# Patient Record
Sex: FEMALE | Race: WHITE | NOT HISPANIC OR LATINO | Employment: OTHER | URBAN - METROPOLITAN AREA
[De-identification: names, ages, dates, MRNs, and addresses within clinical notes are randomized per-mention and may not be internally consistent; named-entity substitution may affect disease eponyms.]

---

## 2017-01-15 ENCOUNTER — APPOINTMENT (OUTPATIENT)
Dept: GENERAL RADIOLOGY | Facility: HOSPITAL | Age: 72
End: 2017-01-15

## 2017-01-15 ENCOUNTER — HOSPITAL ENCOUNTER (EMERGENCY)
Facility: HOSPITAL | Age: 72
Discharge: HOME | End: 2017-01-15
Attending: EMERGENCY MEDICINE | Admitting: EMERGENCY MEDICINE

## 2017-01-15 VITALS
HEART RATE: 106 BPM | HEIGHT: 60 IN | DIASTOLIC BLOOD PRESSURE: 99 MMHG | TEMPERATURE: 98.1 F | WEIGHT: 132 LBS | RESPIRATION RATE: 15 BRPM | OXYGEN SATURATION: 96 % | SYSTOLIC BLOOD PRESSURE: 177 MMHG | BODY MASS INDEX: 25.91 KG/M2

## 2017-01-15 DIAGNOSIS — T48.6X1A THEOPHYLLINE TOXICITY: ICD-10-CM

## 2017-01-15 DIAGNOSIS — R11.2 NAUSEA VOMITING AND DIARRHEA: Primary | ICD-10-CM

## 2017-01-15 DIAGNOSIS — R89.2 THEOPHYLLINE TOXICITY: ICD-10-CM

## 2017-01-15 DIAGNOSIS — R19.7 NAUSEA VOMITING AND DIARRHEA: Primary | ICD-10-CM

## 2017-01-15 LAB
ALBUMIN SERPL-MCNC: 4.2 G/DL (ref 3.5–5.2)
ALBUMIN/GLOB SERPL: 1.3 G/DL
ALP SERPL-CCNC: 90 U/L (ref 39–117)
ALT SERPL W P-5'-P-CCNC: 14 U/L (ref 1–33)
ANION GAP SERPL CALCULATED.3IONS-SCNC: 16.1 MMOL/L
AST SERPL-CCNC: 17 U/L (ref 1–32)
BACTERIA UR QL AUTO: ABNORMAL /HPF
BASOPHILS # BLD AUTO: 0.02 10*3/MM3 (ref 0–0.2)
BASOPHILS NFR BLD AUTO: 0.3 % (ref 0–1.5)
BILIRUB SERPL-MCNC: 0.2 MG/DL (ref 0.1–1.2)
BILIRUB UR QL STRIP: NEGATIVE
BUN BLD-MCNC: 16 MG/DL (ref 8–23)
BUN/CREAT SERPL: 13.2 (ref 7–25)
CALCIUM SPEC-SCNC: 9.9 MG/DL (ref 8.6–10.5)
CHLORIDE SERPL-SCNC: 98 MMOL/L (ref 98–107)
CLARITY UR: CLEAR
CO2 SERPL-SCNC: 23.9 MMOL/L (ref 22–29)
COLOR UR: YELLOW
CREAT BLD-MCNC: 1.21 MG/DL (ref 0.57–1)
DEPRECATED RDW RBC AUTO: 44.5 FL (ref 37–54)
EOSINOPHIL # BLD AUTO: 0.09 10*3/MM3 (ref 0–0.7)
EOSINOPHIL NFR BLD AUTO: 1.1 % (ref 0.3–6.2)
ERYTHROCYTE [DISTWIDTH] IN BLOOD BY AUTOMATED COUNT: 14.5 % (ref 11.7–13)
GFR SERPL CREATININE-BSD FRML MDRD: 44 ML/MIN/1.73
GLOBULIN UR ELPH-MCNC: 3.3 GM/DL
GLUCOSE BLD-MCNC: 131 MG/DL (ref 65–99)
GLUCOSE UR STRIP-MCNC: NEGATIVE MG/DL
HCT VFR BLD AUTO: 40.3 % (ref 35.6–45.5)
HGB BLD-MCNC: 13.3 G/DL (ref 11.9–15.5)
HGB UR QL STRIP.AUTO: NEGATIVE
HOLD SPECIMEN: NORMAL
HOLD SPECIMEN: NORMAL
HYALINE CASTS UR QL AUTO: ABNORMAL /LPF
IMM GRANULOCYTES # BLD: 0 10*3/MM3 (ref 0–0.03)
IMM GRANULOCYTES NFR BLD: 0 % (ref 0–0.5)
KETONES UR QL STRIP: NEGATIVE
LEUKOCYTE ESTERASE UR QL STRIP.AUTO: ABNORMAL
LIPASE SERPL-CCNC: 25 U/L (ref 13–60)
LYMPHOCYTES # BLD AUTO: 1.16 10*3/MM3 (ref 0.9–4.8)
LYMPHOCYTES NFR BLD AUTO: 14.5 % (ref 19.6–45.3)
MCH RBC QN AUTO: 27.6 PG (ref 26.9–32)
MCHC RBC AUTO-ENTMCNC: 33 G/DL (ref 32.4–36.3)
MCV RBC AUTO: 83.6 FL (ref 80.5–98.2)
MONOCYTES # BLD AUTO: 0.35 10*3/MM3 (ref 0.2–1.2)
MONOCYTES NFR BLD AUTO: 4.4 % (ref 5–12)
NEUTROPHILS # BLD AUTO: 6.38 10*3/MM3 (ref 1.9–8.1)
NEUTROPHILS NFR BLD AUTO: 79.7 % (ref 42.7–76)
NITRITE UR QL STRIP: NEGATIVE
PH UR STRIP.AUTO: 7 [PH] (ref 5–8)
PLATELET # BLD AUTO: 257 10*3/MM3 (ref 140–500)
PMV BLD AUTO: 9.8 FL (ref 6–12)
POTASSIUM BLD-SCNC: 3.2 MMOL/L (ref 3.5–5.2)
PROT SERPL-MCNC: 7.5 G/DL (ref 6–8.5)
PROT UR QL STRIP: NEGATIVE
RBC # BLD AUTO: 4.82 10*6/MM3 (ref 3.9–5.2)
RBC # UR: ABNORMAL /HPF
REF LAB TEST METHOD: ABNORMAL
SODIUM BLD-SCNC: 138 MMOL/L (ref 136–145)
SP GR UR STRIP: 1.01 (ref 1–1.03)
SQUAMOUS #/AREA URNS HPF: ABNORMAL /HPF
THEOPHYLLINE SERPL-MCNC: 21.3 MCG/ML (ref 10–20)
TROPONIN T SERPL-MCNC: <0.01 NG/ML (ref 0–0.03)
UROBILINOGEN UR QL STRIP: ABNORMAL
WBC NRBC COR # BLD: 8 10*3/MM3 (ref 4.5–10.7)
WBC UR QL AUTO: ABNORMAL /HPF
WHOLE BLOOD HOLD SPECIMEN: NORMAL
WHOLE BLOOD HOLD SPECIMEN: NORMAL

## 2017-01-15 PROCEDURE — 93010 ELECTROCARDIOGRAM REPORT: CPT | Performed by: INTERNAL MEDICINE

## 2017-01-15 PROCEDURE — 84484 ASSAY OF TROPONIN QUANT: CPT | Performed by: EMERGENCY MEDICINE

## 2017-01-15 PROCEDURE — 80053 COMPREHEN METABOLIC PANEL: CPT | Performed by: EMERGENCY MEDICINE

## 2017-01-15 PROCEDURE — 74022 RADEX COMPL AQT ABD SERIES: CPT

## 2017-01-15 PROCEDURE — 99284 EMERGENCY DEPT VISIT MOD MDM: CPT

## 2017-01-15 PROCEDURE — 93005 ELECTROCARDIOGRAM TRACING: CPT | Performed by: EMERGENCY MEDICINE

## 2017-01-15 PROCEDURE — 96374 THER/PROPH/DIAG INJ IV PUSH: CPT

## 2017-01-15 PROCEDURE — 85025 COMPLETE CBC W/AUTO DIFF WBC: CPT | Performed by: EMERGENCY MEDICINE

## 2017-01-15 PROCEDURE — 80198 ASSAY OF THEOPHYLLINE: CPT | Performed by: EMERGENCY MEDICINE

## 2017-01-15 PROCEDURE — 83690 ASSAY OF LIPASE: CPT | Performed by: EMERGENCY MEDICINE

## 2017-01-15 PROCEDURE — 25010000002 ONDANSETRON PER 1 MG: Performed by: EMERGENCY MEDICINE

## 2017-01-15 PROCEDURE — 81001 URINALYSIS AUTO W/SCOPE: CPT | Performed by: EMERGENCY MEDICINE

## 2017-01-15 RX ORDER — ONDANSETRON 2 MG/ML
4 INJECTION INTRAMUSCULAR; INTRAVENOUS ONCE
Status: COMPLETED | OUTPATIENT
Start: 2017-01-15 | End: 2017-01-15

## 2017-01-15 RX ORDER — SODIUM CHLORIDE 0.9 % (FLUSH) 0.9 %
10 SYRINGE (ML) INJECTION AS NEEDED
Status: DISCONTINUED | OUTPATIENT
Start: 2017-01-15 | End: 2017-01-15 | Stop reason: HOSPADM

## 2017-01-15 RX ORDER — ONDANSETRON 4 MG/1
4 TABLET, FILM COATED ORAL EVERY 6 HOURS
Qty: 12 TABLET | Refills: 0 | Status: SHIPPED | OUTPATIENT
Start: 2017-01-15 | End: 2017-08-23

## 2017-01-15 RX ADMIN — ONDANSETRON 4 MG: 2 INJECTION INTRAMUSCULAR; INTRAVENOUS at 19:44

## 2017-01-15 RX ADMIN — SODIUM CHLORIDE 500 ML: 9 INJECTION, SOLUTION INTRAVENOUS at 19:31

## 2017-01-15 NOTE — ED PROVIDER NOTES
" EMERGENCY DEPARTMENT ENCOUNTER    CHIEF COMPLAINT  Chief Complaint: Nausea  History given by: Patient  History limited by: N/A  Room Number: [facility]/C  PMD: Babar Jones MD      HPI:  Pt is a 71 y.o. female who presents complaining of nausea onset today around 0200 after having eaten fish at [employer]. Pt reports pain in her L arm and diarrhea x3. Pt states that her L arm pain resolved PTA. Pt states that she took over the counter nausea medicine but had no improvement. Pt denies CP, SOA, vomiting, cough, fever, and chills.    Duration:  4 hours  Onset: Gradual  Timing: Constant  Location: N/A  Radiation: N/A  Quality: \"nausea\"  Intensity/Severity: Moderate  Progression: Worsening  Associated Symptoms: L arm pain and diarrhea x3  Aggravating Factors: None  Alleviating Factors: None  Previous Episodes: None  Treatment before arrival: Over the counter nausea medicine, no improvement    PAST MEDICAL HISTORY  Active Ambulatory Problems     Diagnosis Date Noted   • Chest pain 10/20/2016   • Colon polyp 02/15/2012   • Chronic obstructive pulmonary disease 02/15/2012   • Diverticulosis of intestine 02/15/2012   • Essential hypertension 03/24/2016   • Gastroesophageal reflux disease without esophagitis 03/24/2016   • Hypercholesterolemia 03/24/2016   • Migraine 02/15/2012   • Osteopenia 10/20/2016   • Multiple thyroid nodules 10/20/2016   • Chronic fatigue 10/20/2016   • Prediabetes 10/29/2016   • Vitamin D deficiency 10/29/2016   • Renal insufficiency 10/29/2016     Resolved Ambulatory Problems     Diagnosis Date Noted   • No Resolved Ambulatory Problems     Past Medical History   Diagnosis Date   • Arthritis    • Asthma    • Depression    • GERD (gastroesophageal reflux disease)    • Hiatal hernia    • Hypokalemia    • Mild mitral regurgitation    • Tricuspid regurgitation        PAST SURGICAL HISTORY  History reviewed. No pertinent past surgical history.    FAMILY HISTORY  Family History   Problem Relation Age of " Onset   • Heart attack Other    • Heart disease Other        SOCIAL HISTORY  Social History     Social History   • Marital status:      Spouse name: N/A   • Number of children: N/A   • Years of education: N/A     Occupational History   • Not on file.     Social History Main Topics   • Smoking status: Former Smoker   • Smokeless tobacco: Never Used   • Alcohol use No   • Drug use: No   • Sexual activity: Defer     Other Topics Concern   • Not on file     Social History Narrative   • No narrative on file       ALLERGIES  Aspirin; Sertraline; and Iodinated diagnostic agents    REVIEW OF SYSTEMS  Review of Systems   Constitutional: Negative for chills and fever.   HENT: Negative for congestion and sore throat.    Respiratory: Negative for cough and shortness of breath.    Cardiovascular: Negative for chest pain.   Gastrointestinal: Positive for diarrhea (x3) and nausea. Negative for abdominal pain and vomiting.   Genitourinary: Negative for difficulty urinating and dysuria.   Musculoskeletal: Negative for neck pain.        L arm pain   Skin: Negative for pallor and rash.   Neurological: Negative for weakness, numbness and headaches.   All other systems reviewed and are negative.      PHYSICAL EXAM  ED Triage Vitals   Temp Heart Rate Resp BP SpO2   01/15/17 1737 01/15/17 1737 01/15/17 1737 01/15/17 1822 01/15/17 1737   98.1 °F (36.7 °C) 122 18 171/87 98 %      Temp src Heart Rate Source Patient Position BP Location FiO2 (%)   -- 01/15/17 1822 01/15/17 1822 -- --    Monitor Lying         Physical Exam   Constitutional: She is oriented to person, place, and time and well-developed, well-nourished, and in no distress. No distress.   HENT:   Head: Normocephalic and atraumatic.   Eyes: EOM are normal. Pupils are equal, round, and reactive to light.   Neck: Normal range of motion. Neck supple.   Cardiovascular: Normal rate, regular rhythm and normal heart sounds.    Pulmonary/Chest: Effort normal and breath sounds  normal. No respiratory distress.   Abdominal: Soft. There is no tenderness. There is no rebound and no guarding.   Musculoskeletal: Normal range of motion. She exhibits no edema.   Neurological: She is alert and oriented to person, place, and time. She has normal sensation and normal strength.   Skin: Skin is warm and dry. No rash noted.   Psychiatric: Mood and affect normal.   Nursing note and vitals reviewed.      LAB RESULTS  Lab Results (last 24 hours)     Procedure Component Value Units Date/Time    CBC & Differential [[id number]] Collected:  01/15/17 1753    Specimen:  Blood Updated:  01/15/17 1804    Narrative:       The following orders were created for panel order CBC & Differential.  Procedure                               Abnormality         Status                     ---------                               -----------         ------                     CBC Auto Differential[[id number]]         Abnormal            Final result                 Please view results for these tests on the individual orders.    Comprehensive Metabolic Panel [[id number]]  (Abnormal) Collected:  01/15/17 1753    Specimen:  Blood Updated:  01/15/17 1829     Glucose 131 (H) mg/dL      BUN 16 mg/dL      Creatinine 1.21 (H) mg/dL      Sodium 138 mmol/L      Potassium 3.2 (L) mmol/L      Chloride 98 mmol/L      CO2 23.9 mmol/L      Calcium 9.9 mg/dL      Total Protein 7.5 g/dL      Albumin 4.20 g/dL      ALT (SGPT) 14 U/L      AST (SGOT) 17 U/L      Alkaline Phosphatase 90 U/L      Total Bilirubin 0.2 mg/dL      eGFR Non African Amer 44 (L) mL/min/1.73      Globulin 3.3 gm/dL      A/G Ratio 1.3 g/dL      BUN/Creatinine Ratio 13.2      Anion Gap 16.1 mmol/L     Narrative:       The MDRD GFR formula is only valid for adults with stable renal function between ages 18 and 70.    Lipase [[id number]]  (Normal) Collected:  01/15/17 1753    Specimen:  Blood Updated:  01/15/17 1829     Lipase 25 U/L     Theophylline Level [[id number]]  (Abnormal)  Collected:  01/15/17 1753    Specimen:  Blood Updated:  01/15/17 1830     Theophylline Level 21.3 (C) mcg/mL     CBC Auto Differential [[id number]]  (Abnormal) Collected:  01/15/17 1753    Specimen:  Blood Updated:  01/15/17 1804     WBC 8.00 10*3/mm3      RBC 4.82 10*6/mm3      Hemoglobin 13.3 g/dL      Hematocrit 40.3 %      MCV 83.6 fL      MCH 27.6 pg      MCHC 33.0 g/dL      RDW 14.5 (H) %      RDW-SD 44.5 fl      MPV 9.8 fL      Platelets 257 10*3/mm3      Neutrophil % 79.7 (H) %      Lymphocyte % 14.5 (L) %      Monocyte % 4.4 (L) %      Eosinophil % 1.1 %      Basophil % 0.3 %      Immature Grans % 0.0 %      Neutrophils, Absolute 6.38 10*3/mm3      Lymphocytes, Absolute 1.16 10*3/mm3      Monocytes, Absolute 0.35 10*3/mm3      Eosinophils, Absolute 0.09 10*3/mm3      Basophils, Absolute 0.02 10*3/mm3      Immature Grans, Absolute 0.00 10*3/mm3     Troponin [[id number]]  (Normal) Collected:  01/15/17 1753    Specimen:  Blood Updated:  01/15/17 1916     Troponin T <0.010 ng/mL     Narrative:       Troponin T Reference Ranges:  Less than 0.03 ng/mL:    Negative for AMI  0.03 to 0.09 ng/mL:      Indeterminant for AMI  Greater than 0.09 ng/mL: Positive for AMI    Urinalysis With / Culture If Indicated [[id number]]  (Abnormal) Collected:  01/15/17 1822    Specimen:  Urine from Urine, Clean Catch Updated:  01/15/17 1843     Color, UA Yellow      Appearance, UA Clear      pH, UA 7.0      Specific Gravity, UA 1.015      Glucose, UA Negative      Ketones, UA Negative      Bilirubin, UA Negative      Blood, UA Negative      Protein, UA Negative      Leuk Esterase, UA Trace (A)      Nitrite, UA Negative      Urobilinogen, UA 1.0 E.U./dL     Urinalysis, Microscopic Only [[id number]]  (Abnormal) Collected:  01/15/17 1822    Specimen:  Urine from Urine, Clean Catch Updated:  01/15/17 1854     RBC, UA 0-2 (A) /HPF      WBC, UA 0-2 (A) /HPF      Bacteria, UA None Seen /HPF      Squamous Epithelial Cells, UA 3-6 (A) /HPF       Hyaline Casts, UA None Seen /LPF      Methodology Manual Light Microscopy           I ordered the above labs and reviewed the results.    RADIOLOGY  XR Abdomen 2 View With Chest 1 View   Final Result         CXR - Shows that the lungs are well-expanded and clear. The heart is borderline enlarged  and there is no change from 11/9/2015. Interpreted by the radiologist and reviewed by me.    XR Abdomen - Shows that the bowel gas pattern is unremarkable. There is no evidence of obstruction or free air or unusual abdominal calcification. Interpreted by radiologist and reviewed by me.    I ordered the above noted radiological studies. Interpreted by radiologist. Reviewed by me in PACS.     EKG         EKG time: 1905  Rhythm/Rate: Sinus Tachycardia at 114 bpm  P waves and MA: Normal  QRS, axis: Normal  ST and T waves: Nonspecific ST changes  Interpreted contemporaneously by me and independently viewed.  Unchanged compared to prior (11/9/15).      PROCEDURES  Procedures      PROGRESS AND CONSULTS  ED Course     6:37 PM:  Vitals: BP: 171/87 HR: 109 Temp: 98.1 °F (36.7 °C) O2 sat: 96%  D/w pt plan for Iv fluids for hydration, Zofran, abdominal/pelvic xray, EKG, and labs for further evaluation.    7:53 PM:  Vitals: BP: 177/96 HR: 101 Temp: 98.1 °F (36.7 °C) O2 sat: 95%  Rechecked pt. Pt is resting comfortably and states that she feels much better. Discussed with pt test results and plan for discharge. Pt understands and agrees with the plan, all questions answered.  Will hold next two doses of Theophylline and follow up with PCP.       MEDICAL DECISION MAKING  Results were reviewed/discussed with the patient and they were also made aware of online access. Pt also made aware that some labs, such as cultures, will not be resulted during ER visit and follow up with PMD is necessary.     MDM  Number of Diagnoses or Management Options     Amount and/or Complexity of Data Reviewed  Clinical lab tests: ordered and reviewed  Tests in  the radiology section of CPT®: ordered and reviewed  Tests in the medicine section of CPT®: ordered and reviewed  Decide to obtain previous medical records or to obtain history from someone other than the patient: yes           DIAGNOSIS  Final diagnoses:   Nausea vomiting and diarrhea- probable food poisining   Theophylline toxicity- mild       DISPOSITION  DISCHARGE    Patient discharged in stable condition.    Reviewed implications of results, diagnosis, meds, responsibility to follow up, warning signs and symptoms of possible worsening, potential complications and reasons to return to ER.    Patient/Family voiced understanding of above instructions.    Discussed plan for discharge, as there is no emergent indication for admission.  Pt/family is agreeable and understands need for follow up and repeat testing.  Pt is aware that discharge does not mean that nothing is wrong but it indicates no emergency is present that requires admission and they must continue care with follow-up as given below or physician of their choice.     FOLLOW-UP  Babar Jones MD  [address] [id number] [phone]    Call in 1 day  If symptoms worsen or not improved         Medication List      New Prescriptions          ondansetron 4 MG tablet   Commonly known as:  ZOFRAN   Take 1 tablet by mouth Every 6 (Six) Hours.           Latest Documented Vital Signs:  As of 10:25 PM  BP- 177/99 HR- 106 Temp- 98.1 °F (36.7 °C) O2 sat- 96%    --  Documentation assistance provided by ovidio Stephens for Umang Hardin MD.  Information recorded by the scribe was done at my direction and has been verified and validated by me.       Jr Stephens  01/15/17 1957       Umang Hardin MD  01/15/17 0521

## 2017-02-08 DIAGNOSIS — R73.03 PREDIABETES: ICD-10-CM

## 2017-02-08 DIAGNOSIS — R53.82 CHRONIC FATIGUE: ICD-10-CM

## 2017-02-08 DIAGNOSIS — E04.2 MULTIPLE THYROID NODULES: ICD-10-CM

## 2017-02-08 DIAGNOSIS — E55.9 VITAMIN D DEFICIENCY: Primary | ICD-10-CM

## 2017-02-08 DIAGNOSIS — E55.9 VITAMIN D DEFICIENCY: ICD-10-CM

## 2017-02-08 DIAGNOSIS — E78.00 HYPERCHOLESTEROLEMIA: Primary | ICD-10-CM

## 2017-02-09 ENCOUNTER — LAB (OUTPATIENT)
Dept: ENDOCRINOLOGY | Age: 72
End: 2017-02-09

## 2017-02-09 DIAGNOSIS — E55.9 VITAMIN D DEFICIENCY: ICD-10-CM

## 2017-02-09 DIAGNOSIS — R53.82 CHRONIC FATIGUE: ICD-10-CM

## 2017-02-09 DIAGNOSIS — R73.03 PREDIABETES: ICD-10-CM

## 2017-02-09 DIAGNOSIS — E04.2 MULTIPLE THYROID NODULES: ICD-10-CM

## 2017-02-09 DIAGNOSIS — E78.00 HYPERCHOLESTEROLEMIA: ICD-10-CM

## 2017-02-10 LAB — 25(OH)D3+25(OH)D2 SERPL-MCNC: 49.9 NG/ML (ref 30–100)

## 2017-02-11 LAB
ALBUMIN SERPL-MCNC: 4.4 G/DL (ref 3.5–5.2)
ALBUMIN/GLOB SERPL: 1.8 G/DL
ALP SERPL-CCNC: 84 U/L (ref 39–117)
ALT SERPL-CCNC: 15 U/L (ref 1–33)
AST SERPL-CCNC: 21 U/L (ref 1–32)
BILIRUB SERPL-MCNC: 0.4 MG/DL (ref 0.1–1.2)
BUN SERPL-MCNC: 16 MG/DL (ref 8–23)
BUN/CREAT SERPL: 12.4 (ref 7–25)
C PEPTIDE SERPL-MCNC: 10.7 NG/ML (ref 1.1–4.4)
CALCIUM SERPL-MCNC: 9.7 MG/DL (ref 8.6–10.5)
CHLORIDE SERPL-SCNC: 99 MMOL/L (ref 98–107)
CHOLEST SERPL-MCNC: 226 MG/DL (ref 0–200)
CO2 SERPL-SCNC: 23.8 MMOL/L (ref 22–29)
CREAT SERPL-MCNC: 1.29 MG/DL (ref 0.57–1)
GLOBULIN SER CALC-MCNC: 2.4 GM/DL
GLUCOSE SERPL-MCNC: 116 MG/DL (ref 65–99)
HBA1C MFR BLD: 5.53 % (ref 4.8–5.6)
HDLC SERPL-MCNC: 94 MG/DL (ref 40–60)
LDLC SERPL CALC-MCNC: 100 MG/DL (ref 0–100)
POTASSIUM SERPL-SCNC: 3.7 MMOL/L (ref 3.5–5.2)
PROT SERPL-MCNC: 6.8 G/DL (ref 6–8.5)
SODIUM SERPL-SCNC: 142 MMOL/L (ref 136–145)
T3FREE SERPL-MCNC: 3.2 PG/ML (ref 2–4.4)
T4 FREE SERPL-MCNC: 1.41 NG/DL (ref 0.93–1.7)
T4 SERPL-MCNC: 9.03 MCG/DL (ref 4.5–11.7)
THYROGLOB AB SERPL-ACNC: <1 IU/ML
THYROGLOB SERPL-MCNC: 5.9 NG/ML
THYROGLOB SERPL-MCNC: NORMAL NG/ML
TRIGL SERPL-MCNC: 158 MG/DL (ref 0–150)
TSH SERPL DL<=0.005 MIU/L-ACNC: 1.45 MIU/ML (ref 0.27–4.2)
VLDLC SERPL CALC-MCNC: 31.6 MG/DL (ref 5–40)

## 2017-02-23 ENCOUNTER — OFFICE VISIT (OUTPATIENT)
Dept: ENDOCRINOLOGY | Age: 72
End: 2017-02-23

## 2017-02-23 VITALS
WEIGHT: 137.4 LBS | RESPIRATION RATE: 16 BRPM | HEIGHT: 59 IN | DIASTOLIC BLOOD PRESSURE: 82 MMHG | SYSTOLIC BLOOD PRESSURE: 120 MMHG | BODY MASS INDEX: 27.7 KG/M2

## 2017-02-23 DIAGNOSIS — E04.2 MULTIPLE THYROID NODULES: ICD-10-CM

## 2017-02-23 DIAGNOSIS — E78.00 HYPERCHOLESTEROLEMIA: ICD-10-CM

## 2017-02-23 DIAGNOSIS — E55.9 VITAMIN D DEFICIENCY: ICD-10-CM

## 2017-02-23 DIAGNOSIS — I10 ESSENTIAL HYPERTENSION: ICD-10-CM

## 2017-02-23 DIAGNOSIS — R73.03 PREDIABETES: Primary | ICD-10-CM

## 2017-02-23 DIAGNOSIS — R53.82 CHRONIC FATIGUE: ICD-10-CM

## 2017-02-23 PROCEDURE — 99214 OFFICE O/P EST MOD 30 MIN: CPT | Performed by: INTERNAL MEDICINE

## 2017-02-23 RX ORDER — ROSUVASTATIN CALCIUM 5 MG/1
5 TABLET, COATED ORAL NIGHTLY
Qty: 30 TABLET | Refills: 11 | Status: SHIPPED | OUTPATIENT
Start: 2017-02-23 | End: 2017-08-23

## 2017-02-23 NOTE — PROGRESS NOTES
"Subjective   Mary Boyle is a 71 y.o. female seen for follow up for thyroid nodule, vit d deficiency, lab review. Patient has questions about her thyroid nodules. Patient states she had a thyroid US prior to first visit with Moriah and was told her thyroid nodules are growing. She has a  that comes to check on her and she was told her thyroid levels are normal. She is not as tired as she was before she started seeing Moriah. She is also drinking Ensure Alive drinks.    History of Present Illness this is a 71-year-old female known patient with prediabetes and thyroid nodules dyslipidemia and vitamin D deficiency.  Little more than a month ago she was in the emergency room because of abdominal pain nausea and vomiting and was found to have food poisoning.  Visit Vitals   • /82   • Resp 16   • Ht 59\" (149.9 cm)   • Wt 137 lb 6.4 oz (62.3 kg)   • BMI 27.75 kg/m2     Allergies   Allergen Reactions   • Aspirin Other (See Comments)     WHEEZING  WHEEZING   • Sertraline Diarrhea, Nausea And Vomiting and Other (See Comments)     CHEST PAIN   CHEST PAIN    • Iodinated Diagnostic Agents Rash       Current Outpatient Prescriptions:   •  albuterol (VENTOLIN HFA) 108 (90 BASE) MCG/ACT inhaler, As Needed., Disp: , Rfl:   •  ergocalciferol (DRISDOL) 40324 UNITS capsule, Take 1 po q week, Disp: 12 capsule, Rfl: 1  •  esomeprazole (NEXIUM) 40 MG capsule, Take 40 mg by mouth Daily., Disp: , Rfl:   •  ipratropium-albuterol (DUO-NEB) 0.5-2.5 mg/mL nebulizer, Inhale 3 mL Every 6 (Six) Hours., Disp: , Rfl:   •  loratadine (CLARITIN) 10 MG tablet, Take 10 mg by mouth Every Night., Disp: , Rfl:   •  nitroglycerin (NITROSTAT) 0.4 MG SL tablet, Place 0.4 mg under the tongue Every 5 (Five) Minutes. prn, Disp: , Rfl:   •  ondansetron (ZOFRAN) 4 MG tablet, Take 1 tablet by mouth Every 6 (Six) Hours., Disp: 12 tablet, Rfl: 0  •  potassium chloride (K-DUR,KLOR-CON) 10 MEQ CR tablet, Take 10 mEq by mouth Daily., Disp: , Rfl: 3  •  " theophylline (THEODUR) 300 MG 12 hr tablet, Take 300 mg by mouth 2 (Two) Times a Day., Disp: , Rfl:   •  triamterene-hydrochlorothiazide (DYAZIDE) 37.5-25 MG per capsule, Take 1 capsule by mouth Daily., Disp: , Rfl:           The following portions of the patient's history were reviewed and updated as appropriate: allergies, current medications, past family history, past medical history, past social history, past surgical history and problem list.    Review of Systems   Constitutional: Negative.    HENT: Negative.    Eyes: Negative.    Respiratory: Negative.    Cardiovascular: Negative.    Gastrointestinal: Negative.    Endocrine: Negative.    Genitourinary: Negative.    Musculoskeletal: Negative.    Skin: Negative.    Allergic/Immunologic: Negative.    Neurological: Negative.    Psychiatric/Behavioral: Negative.        Objective   Physical Exam   Constitutional: She is oriented to person, place, and time. She appears well-developed and well-nourished. No distress.   HENT:   Head: Normocephalic and atraumatic.   Right Ear: External ear normal.   Left Ear: External ear normal.   Nose: Nose normal.   Mouth/Throat: Oropharynx is clear and moist. No oropharyngeal exudate.   Eyes: Conjunctivae and EOM are normal. Pupils are equal, round, and reactive to light. Right eye exhibits no discharge. Left eye exhibits no discharge. No scleral icterus.   Neck: Trachea normal, normal range of motion and full passive range of motion without pain. Neck supple. No JVD present. No tracheal tenderness present. Carotid bruit is not present. No tracheal deviation, no edema and no erythema present. Thyromegaly present. No thyroid mass present.   Soft multiple nodules on both lobes of the thyroid more prominent on the right side.  They move freely with swallowing and are nontender.   Cardiovascular: Normal rate, regular rhythm, normal heart sounds and intact distal pulses.  Exam reveals no gallop and no friction rub.    No murmur  heard.  Pulmonary/Chest: Effort normal and breath sounds normal. No stridor. No respiratory distress. She has no wheezes. She has no rales.   Abdominal: Soft. Bowel sounds are normal. She exhibits no distension and no mass. There is no tenderness. There is no rebound and no guarding. No hernia.   Musculoskeletal: Normal range of motion. She exhibits no edema, tenderness or deformity.   Lymphadenopathy:     She has no cervical adenopathy.   Neurological: She is alert and oriented to person, place, and time. She has normal reflexes. She displays normal reflexes. No cranial nerve deficit. She exhibits normal muscle tone. Coordination normal.   Skin: Skin is warm and dry. No rash noted. She is not diaphoretic. No erythema. No pallor.   Psychiatric: She has a normal mood and affect. Her behavior is normal. Judgment and thought content normal.   Nursing note and vitals reviewed.     Results for orders placed or performed in visit on 02/09/17   Comprehensive Metabolic Panel   Result Value Ref Range    Glucose 116 (H) 65 - 99 mg/dL    BUN 16 8 - 23 mg/dL    Creatinine 1.29 (H) 0.57 - 1.00 mg/dL    eGFR Non African Am 41 (L) >60 mL/min/1.73    eGFR African Am 49 (L) >60 mL/min/1.73    BUN/Creatinine Ratio 12.4 7.0 - 25.0    Sodium 142 136 - 145 mmol/L    Potassium 3.7 3.5 - 5.2 mmol/L    Chloride 99 98 - 107 mmol/L    Total CO2 23.8 22.0 - 29.0 mmol/L    Calcium 9.7 8.6 - 10.5 mg/dL    Total Protein 6.8 6.0 - 8.5 g/dL    Albumin 4.40 3.50 - 5.20 g/dL    Globulin 2.4 gm/dL    A/G Ratio 1.8 g/dL    Total Bilirubin 0.4 0.1 - 1.2 mg/dL    Alkaline Phosphatase 84 39 - 117 U/L    AST (SGOT) 21 1 - 32 U/L    ALT (SGPT) 15 1 - 33 U/L   Hemoglobin A1c   Result Value Ref Range    Hemoglobin A1C 5.53 4.80 - 5.60 %   Lipid Panel   Result Value Ref Range    Total Cholesterol 226 (H) 0 - 200 mg/dL    Triglycerides 158 (H) 0 - 150 mg/dL    HDL Cholesterol 94 (H) 40 - 60 mg/dL    VLDL Cholesterol 31.6 5 - 40 mg/dL    LDL Cholesterol  100 0  - 100 mg/dL   Comprehensive Thyroglobulin   Result Value Ref Range    Thyroglobulin Ab <1.0 IU/mL    Thyroglobulin 5.9 ng/mL    Thyroglobulin (TG-SUNIL) Comment ng/mL   T3, Free   Result Value Ref Range    T3, Free 3.2 2.0 - 4.4 pg/mL   T4 & TSH ([facility])   Result Value Ref Range    TSH 1.450 0.270 - 4.200 mIU/mL    T4, Total 9.03 4.50 - 11.70 mcg/dL   T4, Free   Result Value Ref Range    Free T4 1.41 0.93 - 1.70 ng/dL   C-Peptide   Result Value Ref Range    C-Peptide 10.7 (H) 1.1 - 4.4 ng/mL           Assessment/Plan   Diagnoses and all orders for this visit:    Prediabetes  -     T3, Free; Future  -     T4 & TSH ([facility]); Future  -     T4, Free; Future  -     Comprehensive Thyroglobulin; Future  -     Comprehensive Metabolic Panel; Future  -     Uric Acid; Future  -     Vitamin D 25 Hydroxy; Future  -     C-Peptide; Future  -     Lipid Panel; Future  -     Hemoglobin A1c; Future    Multiple thyroid nodules  -     T3, Free; Future  -     T4 & TSH ([facility]); Future  -     T4, Free; Future  -     Comprehensive Thyroglobulin; Future  -     Comprehensive Metabolic Panel; Future  -     Uric Acid; Future  -     Vitamin D 25 Hydroxy; Future  -     C-Peptide; Future  -     Lipid Panel; Future  -     Hemoglobin A1c; Future  -     US Thyroid; Future    Vitamin D deficiency  -     T3, Free; Future  -     T4 & TSH ([facility]); Future  -     T4, Free; Future  -     Comprehensive Thyroglobulin; Future  -     Comprehensive Metabolic Panel; Future  -     Uric Acid; Future  -     Vitamin D 25 Hydroxy; Future  -     C-Peptide; Future  -     Lipid Panel; Future  -     Hemoglobin A1c; Future    Essential hypertension  -     T3, Free; Future  -     T4 & TSH ([facility]); Future  -     T4, Free; Future  -     Comprehensive Thyroglobulin; Future  -     Comprehensive Metabolic Panel; Future  -     Uric Acid; Future  -     Vitamin D 25 Hydroxy; Future  -     C-Peptide; Future  -     Lipid Panel; Future  -     Hemoglobin A1c;  Future    Hypercholesterolemia  -     T3, Free; Future  -     T4 & TSH ([facility]); Future  -     T4, Free; Future  -     Comprehensive Thyroglobulin; Future  -     Comprehensive Metabolic Panel; Future  -     Uric Acid; Future  -     Vitamin D 25 Hydroxy; Future  -     C-Peptide; Future  -     Lipid Panel; Future  -     Hemoglobin A1c; Future    Chronic fatigue  -     T3, Free; Future  -     T4 & TSH ([facility]); Future  -     T4, Free; Future  -     Comprehensive Thyroglobulin; Future  -     Comprehensive Metabolic Panel; Future  -     Uric Acid; Future  -     Vitamin D 25 Hydroxy; Future  -     C-Peptide; Future  -     Lipid Panel; Future  -     Hemoglobin A1c; Future    Other orders  -     rosuvastatin (CRESTOR) 5 MG tablet; Take 1 tablet by mouth Every Night.              in summary I saw and examined this 71-year-old female for above-mentioned problems.  I reviewed her laboratory evaluation of 02/09/2017 and provided her with a hard copy of it.  Altogether she is clinically and metabolically stable however because of her history of coronary artery disease and angina her total cholesterol and the LDL level are elevated and for that I will put her on Crestor 5 mg daily.  She will see Ms. Moriah Enriquez in 6 months and myself in 12 months with laboratory evaluations.  She will have a thyroid ultrasound prior to her next appointment.

## 2017-04-10 RX ORDER — ERGOCALCIFEROL 1.25 MG/1
CAPSULE ORAL
Qty: 12 CAPSULE | Refills: 0 | Status: CANCELLED | OUTPATIENT
Start: 2017-04-10

## 2017-04-10 RX ORDER — ERGOCALCIFEROL 1.25 MG/1
CAPSULE ORAL
Qty: 26 CAPSULE | Refills: 0 | Status: SHIPPED | OUTPATIENT
Start: 2017-04-10 | End: 2017-04-18 | Stop reason: SDUPTHER

## 2017-04-18 ENCOUNTER — TELEPHONE (OUTPATIENT)
Dept: ENDOCRINOLOGY | Age: 72
End: 2017-04-18

## 2017-04-18 RX ORDER — ERGOCALCIFEROL 1.25 MG/1
CAPSULE ORAL
Qty: 26 CAPSULE | Refills: 0 | Status: SHIPPED | OUTPATIENT
Start: 2017-04-18 | End: 2018-02-23 | Stop reason: SDUPTHER

## 2017-04-18 NOTE — TELEPHONE ENCOUNTER
CALLED PT SERGEY LFT VM TO INFORM SHE IS STILL SUPPOSED TO TAKE THE VIT D AS WELL AS INFORMED AGAIN WHY RX WAS PROB DENIED. INFORMED TO CB IF SHE EVER HAS ISSUES PICKING UP RX IN FUTURE AND GAVE OFFICE # AND INFORMED CAN FABIOLA W FURTHER QUEST      ----- Message from Chanda Pak sent at 4/18/2017 10:12 AM EDT -----  Contact: PATIENT  THE PATIENT GOT YOUR MESSAGE TELLING HER THAT HER VITAMIN D WAS DENIED BUT SHE IS CONFUSED BECAUSE SHE SAYS SHE PICKED UP NO PROBLEM FROM HER PHARMACY. SHE IS WANTING TO MAKE SURE THAT SHE IS SUPPOSED TO STILL BE TAKING THIS AND WONDERS WHY SHE WAS ABLE TO PICK IT UP IF IT HAD BEEN DENIED. PLEASE CALL THE PATIENT TO ADVISE.

## 2017-08-02 DIAGNOSIS — E78.00 HYPERCHOLESTEROLEMIA: ICD-10-CM

## 2017-08-02 DIAGNOSIS — E55.9 VITAMIN D DEFICIENCY: ICD-10-CM

## 2017-08-02 DIAGNOSIS — E04.2 MULTIPLE THYROID NODULES: Primary | ICD-10-CM

## 2017-08-02 DIAGNOSIS — R73.03 PREDIABETES: ICD-10-CM

## 2017-08-10 ENCOUNTER — LAB (OUTPATIENT)
Dept: ENDOCRINOLOGY | Age: 72
End: 2017-08-10

## 2017-08-10 DIAGNOSIS — E04.2 MULTIPLE THYROID NODULES: ICD-10-CM

## 2017-08-10 DIAGNOSIS — R73.03 PREDIABETES: ICD-10-CM

## 2017-08-10 DIAGNOSIS — E55.9 VITAMIN D DEFICIENCY: ICD-10-CM

## 2017-08-10 DIAGNOSIS — E78.00 HYPERCHOLESTEROLEMIA: ICD-10-CM

## 2017-08-13 LAB
C PEPTIDE SERPL-MCNC: 8.3 NG/ML (ref 1.1–4.4)
CHOLEST SERPL-MCNC: 214 MG/DL (ref 0–200)
FT4I SERPL CALC-MCNC: 2.2 (ref 1.2–4.9)
HDLC SERPL-MCNC: 84 MG/DL (ref 40–60)
LDLC SERPL CALC-MCNC: 106 MG/DL (ref 0–100)
MICROALBUMIN UR-MCNC: 5.1 UG/ML
T3FREE SERPL-MCNC: 2.9 PG/ML (ref 2–4.4)
T3RU NFR SERPL: 26 % (ref 24–39)
T4 FREE SERPL-MCNC: 1.4 NG/DL (ref 0.93–1.7)
T4 SERPL-MCNC: 8.3 UG/DL (ref 4.5–12)
THYROGLOB AB SERPL-ACNC: <1 IU/ML
THYROGLOB SERPL-MCNC: 5.5 NG/ML
THYROGLOB SERPL-MCNC: NORMAL NG/ML
TRIGL SERPL-MCNC: 121 MG/DL (ref 0–150)
TSH SERPL DL<=0.005 MIU/L-ACNC: 1.59 UIU/ML (ref 0.45–4.5)
VLDLC SERPL CALC-MCNC: 24.2 MG/DL (ref 5–40)

## 2017-08-23 ENCOUNTER — OFFICE VISIT (OUTPATIENT)
Dept: ENDOCRINOLOGY | Age: 72
End: 2017-08-23

## 2017-08-23 ENCOUNTER — RESULTS ENCOUNTER (OUTPATIENT)
Dept: ENDOCRINOLOGY | Age: 72
End: 2017-08-23

## 2017-08-23 VITALS
SYSTOLIC BLOOD PRESSURE: 122 MMHG | DIASTOLIC BLOOD PRESSURE: 74 MMHG | HEIGHT: 60 IN | WEIGHT: 132 LBS | BODY MASS INDEX: 25.91 KG/M2

## 2017-08-23 DIAGNOSIS — R73.03 PREDIABETES: ICD-10-CM

## 2017-08-23 DIAGNOSIS — R53.82 CHRONIC FATIGUE: ICD-10-CM

## 2017-08-23 DIAGNOSIS — E78.00 HYPERCHOLESTEROLEMIA: ICD-10-CM

## 2017-08-23 DIAGNOSIS — E55.9 VITAMIN D DEFICIENCY: ICD-10-CM

## 2017-08-23 DIAGNOSIS — E04.2 MULTIPLE THYROID NODULES: ICD-10-CM

## 2017-08-23 DIAGNOSIS — I10 ESSENTIAL HYPERTENSION: ICD-10-CM

## 2017-08-23 DIAGNOSIS — E04.2 MULTIPLE THYROID NODULES: Primary | ICD-10-CM

## 2017-08-23 PROCEDURE — 99214 OFFICE O/P EST MOD 30 MIN: CPT | Performed by: NURSE PRACTITIONER

## 2017-08-23 NOTE — PROGRESS NOTES
"Subjective   Mary Boyle is a 71 y.o. female is here today for follow-up.  Chief Complaint   Patient presents with   • Hyperlipidemia     recent labs, pt never started Crestor   • Hypertension   • thyroid nodules   • Vitamin D Deficiency   • Prediabetes     /74  Ht 60\" (152.4 cm)  Wt 132 lb (59.9 kg)  BMI 25.78 kg/m2  Current Outpatient Prescriptions on File Prior to Visit   Medication Sig   • albuterol (VENTOLIN HFA) 108 (90 BASE) MCG/ACT inhaler As Needed.   • ergocalciferol (DRISDOL) 38678 UNITS capsule TAKE 1 CAPSULE PO ONCE A WEEK   • esomeprazole (NEXIUM) 40 MG capsule Take 40 mg by mouth Daily.   • ipratropium-albuterol (DUO-NEB) 0.5-2.5 mg/mL nebulizer Inhale 3 mL Every 6 (Six) Hours.   • loratadine (CLARITIN) 10 MG tablet Take 10 mg by mouth Every Night.   • nitroglycerin (NITROSTAT) 0.4 MG SL tablet Place 0.4 mg under the tongue Every 5 (Five) Minutes. prn   • potassium chloride (K-DUR,KLOR-CON) 10 MEQ CR tablet Take 10 mEq by mouth Daily.   • theophylline (THEODUR) 300 MG 12 hr tablet Take 300 mg by mouth 2 (Two) Times a Day.   • triamterene-hydrochlorothiazide (DYAZIDE) 37.5-25 MG per capsule Take 1 capsule by mouth Daily.   • [DISCONTINUED] ondansetron (ZOFRAN) 4 MG tablet Take 1 tablet by mouth Every 6 (Six) Hours.   • [DISCONTINUED] rosuvastatin (CRESTOR) 5 MG tablet Take 1 tablet by mouth Every Night.     No current facility-administered medications on file prior to visit.      Family History   Problem Relation Age of Onset   • Heart attack Other    • Heart disease Other      Social History   Substance Use Topics   • Smoking status: Former Smoker   • Smokeless tobacco: Never Used   • Alcohol use No     Allergies   Allergen Reactions   • Aspirin Other (See Comments)     WHEEZING  WHEEZING  WHEEZING   • Doxycycline Other (See Comments)   • Sertraline Diarrhea, Nausea And Vomiting and Other (See Comments)     CHEST PAIN   CHEST PAIN   CHEST PAIN    • Iodinated Diagnostic Agents Rash "         History of Present Illness   Chief Complaint   Patient presents with   • Hyperlipidemia     recent labs, pt never started Crestor   • Hypertension   • thyroid nodules   • Vitamin D Deficiency   • Prediabetes   This is a 71-year-old female patient here today for a routine follow-up visit.  She has a history of thyroid nodules and had a thyroid ultrasound done in march.  Her thyroid ultrasound was reviewed which shows her nodules are stable.  She was provided a copy of the report.  She states she never started taking her Crestor and refuses to because she or he has muscle aches and pains and does not want to make the situation worse.  She recently started working at [employer] but states it is taxing and that she has to walk a lot with work.  She was started on Effexor by her primary care provider and states she only took one tablet could not get out of bed the next day.  She does complain of depression however she does not want to try any other medication at this time.  She has been sober for 14.5 years has thought about starting to drink again.  She was encouraged to seek help for continued supportive for her addiction.  She does complain of fatigue.  She is .  We discussed the benefits of antidepressants.  She does have plans to follow-up with her primary care doctor soon.    The following portions of the patient's history were reviewed and updated as appropriate: allergies, current medications, past family history, past medical history, past social history, past surgical history and problem list.    Review of Systems   Constitutional: Negative for fatigue.   HENT: Negative for trouble swallowing.    Eyes: Negative for visual disturbance.   Respiratory: Negative for shortness of breath.    Cardiovascular: Negative for leg swelling.   Endocrine: Negative for polyuria.   Skin: Negative for wound.   Neurological: Negative for numbness.       Objective   Physical Exam   Constitutional: She is oriented to  person, place, and time. She appears well-developed and well-nourished. No distress.   HENT:   Head: Normocephalic and atraumatic.   Right Ear: External ear normal.   Left Ear: External ear normal.   Nose: Nose normal.   Mouth/Throat: Oropharynx is clear and moist. No oropharyngeal exudate.   Eyes: EOM are normal. Pupils are equal, round, and reactive to light. Right eye exhibits no discharge. Left eye exhibits no discharge.   Neck: Trachea normal, normal range of motion and full passive range of motion without pain. Neck supple. No tracheal tenderness present. Carotid bruit is not present. No tracheal deviation, no edema and no erythema present. No thyroid mass and no thyromegaly present.   Cardiovascular: Normal rate, regular rhythm, normal heart sounds and intact distal pulses.  Exam reveals no gallop and no friction rub.    No murmur heard.  Pulmonary/Chest: Effort normal and breath sounds normal. No stridor. No respiratory distress. She has no wheezes. She has no rales.   Abdominal: Soft. Bowel sounds are normal. She exhibits no distension.   Musculoskeletal: Normal range of motion. She exhibits no edema or deformity.   Lymphadenopathy:     She has no cervical adenopathy.   Neurological: She is alert and oriented to person, place, and time.   Skin: Skin is warm and dry. No rash noted. She is not diaphoretic. No erythema. No pallor.   Psychiatric: She has a normal mood and affect. Her behavior is normal. Judgment and thought content normal.   Nursing note and vitals reviewed.    Results for orders placed or performed in visit on 08/10/17   C-Peptide   Result Value Ref Range    C-Peptide 8.3 (H) 1.1 - 4.4 ng/mL   Comprehensive Thyroglobulin   Result Value Ref Range    Thyroglobulin Ab <1.0 IU/mL    Thyroglobulin 5.5 ng/mL    Thyroglobulin (TG-SUNIL) Comment ng/mL   Lipid Panel   Result Value Ref Range    Total Cholesterol 214 (H) 0 - 200 mg/dL    Triglycerides 121 0 - 150 mg/dL    HDL Cholesterol 84 (H) 40 - 60  mg/dL    VLDL Cholesterol 24.2 5 - 40 mg/dL    LDL Cholesterol  106 (H) 0 - 100 mg/dL   T3, Free   Result Value Ref Range    T3, Free 2.9 2.0 - 4.4 pg/mL   T4, Free   Result Value Ref Range    Free T4 1.40 0.93 - 1.70 ng/dL   Thyroid Panel With TSH   Result Value Ref Range    TSH 1.590 0.450 - 4.500 uIU/mL    T4, Total 8.3 4.5 - 12.0 ug/dL    T3 Uptake 26 24 - 39 %    Free Thyroxine Index 2.2 1.2 - 4.9   MicroAlbumin, Urine, Random   Result Value Ref Range    Microalbumin, Urine 5.1 Not Estab. ug/mL         Assessment/Plan   Mary was seen today for hyperlipidemia, hypertension, thyroid nodules, vitamin d deficiency and prediabetes.    Diagnoses and all orders for this visit:    Multiple thyroid nodules    Vitamin D deficiency    Hypercholesterolemia    Prediabetes    In summary, patient was seen and examined.  Her recent labs were reviewed and she was provided a copy.  She also had labs done her primary care provider's office which were reviewed.  Her blood sugars are an satisfactory range.  Her hemoglobin A1c is in normal range.  Her thyroid ultrasound from march was reviewed and she does that her thyroid nodules are stable.  They have suggested that she repeat in 1 year.  She does have some issues with depression however this time she was to follow-up with primary care provider for further discussions of treatment.  She will follow-up with Dr. Echols her next visit in 6 months with labs prior.  I've encouraged her to contact the office should she have any questions or concerns.  We spent considerable time today discussing the benefits of antidepressant therapy.  No medications were changed.  Metabolically she is stable she refuses statin therapy.  Continue all current meds  Follow up in 6 months with labs prior

## 2018-02-09 ENCOUNTER — LAB (OUTPATIENT)
Dept: ENDOCRINOLOGY | Age: 73
End: 2018-02-09

## 2018-02-09 DIAGNOSIS — E04.2 MULTIPLE THYROID NODULES: ICD-10-CM

## 2018-02-12 LAB
25(OH)D3+25(OH)D2 SERPL-MCNC: 83.2 NG/ML (ref 30–100)
ALBUMIN SERPL-MCNC: 3.8 G/DL (ref 3.5–5.2)
ALBUMIN/GLOB SERPL: 1.5 G/DL
ALP SERPL-CCNC: 84 U/L (ref 39–117)
ALT SERPL-CCNC: 13 U/L (ref 1–33)
AST SERPL-CCNC: 16 U/L (ref 1–32)
BILIRUB SERPL-MCNC: 0.3 MG/DL (ref 0.1–1.2)
BUN SERPL-MCNC: 25 MG/DL (ref 8–23)
BUN/CREAT SERPL: 21.7 (ref 7–25)
C PEPTIDE SERPL-MCNC: 10.2 NG/ML (ref 1.1–4.4)
CALCIUM SERPL-MCNC: 9.8 MG/DL (ref 8.6–10.5)
CHLORIDE SERPL-SCNC: 100 MMOL/L (ref 98–107)
CHOLEST SERPL-MCNC: 208 MG/DL (ref 0–200)
CO2 SERPL-SCNC: 26.9 MMOL/L (ref 22–29)
CREAT SERPL-MCNC: 1.15 MG/DL (ref 0.57–1)
GFR SERPLBLD CREATININE-BSD FMLA CKD-EPI: 46 ML/MIN/1.73
GFR SERPLBLD CREATININE-BSD FMLA CKD-EPI: 56 ML/MIN/1.73
GLOBULIN SER CALC-MCNC: 2.6 GM/DL
GLUCOSE SERPL-MCNC: 95 MG/DL (ref 65–99)
HBA1C MFR BLD: 5.54 % (ref 4.8–5.6)
HDLC SERPL-MCNC: 87 MG/DL (ref 40–60)
INTERPRETATION: NORMAL
LDLC SERPL CALC-MCNC: 87 MG/DL (ref 0–100)
POTASSIUM SERPL-SCNC: 3.9 MMOL/L (ref 3.5–5.2)
PROT SERPL-MCNC: 6.4 G/DL (ref 6–8.5)
SODIUM SERPL-SCNC: 141 MMOL/L (ref 136–145)
T3FREE SERPL-MCNC: 2.7 PG/ML (ref 2–4.4)
T4 FREE SERPL-MCNC: 1.22 NG/DL (ref 0.93–1.7)
T4 SERPL-MCNC: 8.29 MCG/DL (ref 4.5–11.7)
THYROGLOB AB SERPL-ACNC: <1 IU/ML
THYROGLOB SERPL-MCNC: 5.3 NG/ML
THYROGLOB SERPL-MCNC: NORMAL NG/ML
TRIGL SERPL-MCNC: 169 MG/DL (ref 0–150)
TSH SERPL DL<=0.005 MIU/L-ACNC: 2.05 MIU/ML (ref 0.27–4.2)
URATE SERPL-MCNC: 8 MG/DL (ref 2.4–5.7)
VLDLC SERPL CALC-MCNC: 33.8 MG/DL (ref 5–40)

## 2018-02-23 ENCOUNTER — OFFICE VISIT (OUTPATIENT)
Dept: ENDOCRINOLOGY | Age: 73
End: 2018-02-23

## 2018-02-23 VITALS
DIASTOLIC BLOOD PRESSURE: 72 MMHG | SYSTOLIC BLOOD PRESSURE: 120 MMHG | BODY MASS INDEX: 24.66 KG/M2 | HEIGHT: 60 IN | WEIGHT: 125.6 LBS | RESPIRATION RATE: 16 BRPM

## 2018-02-23 DIAGNOSIS — I10 ESSENTIAL HYPERTENSION: ICD-10-CM

## 2018-02-23 DIAGNOSIS — E78.00 HYPERCHOLESTEROLEMIA: ICD-10-CM

## 2018-02-23 DIAGNOSIS — R53.82 CHRONIC FATIGUE: ICD-10-CM

## 2018-02-23 DIAGNOSIS — E04.2 NONTOXIC MULTINODULAR GOITER: ICD-10-CM

## 2018-02-23 DIAGNOSIS — E55.9 VITAMIN D DEFICIENCY: ICD-10-CM

## 2018-02-23 DIAGNOSIS — R73.03 PREDIABETES: Primary | ICD-10-CM

## 2018-02-23 PROCEDURE — 99214 OFFICE O/P EST MOD 30 MIN: CPT | Performed by: INTERNAL MEDICINE

## 2018-02-23 RX ORDER — FEBUXOSTAT 80 MG/1
80 TABLET ORAL DAILY
Qty: 90 TABLET | Refills: 3 | Status: SHIPPED | OUTPATIENT
Start: 2018-02-23 | End: 2018-03-09 | Stop reason: SDUPTHER

## 2018-02-23 RX ORDER — ERGOCALCIFEROL 1.25 MG/1
CAPSULE ORAL
Qty: 26 CAPSULE | Refills: 3 | Status: SHIPPED | OUTPATIENT
Start: 2018-02-23 | End: 2018-09-12 | Stop reason: SDUPTHER

## 2018-02-23 RX ORDER — FEBUXOSTAT 80 MG/1
80 TABLET ORAL DAILY
Qty: 30 TABLET | Refills: 5 | Status: SHIPPED | OUTPATIENT
Start: 2018-02-23 | End: 2018-02-23 | Stop reason: SDUPTHER

## 2018-02-23 NOTE — PROGRESS NOTES
"Subjective   Mary Boyle is a 72 y.o. female seen for follow up for thyroid nodule, goiter, vit d deficiency, lab review. Patient had a dizzy spell last night. She is having pain in the front of her neck, trouble swallowing. She was told by her PCP that her right ear has fluid and she would like to have this checked as well.      History of Present Illness this is a 72-year-old female known patient with thyroid nodule as well as prediabetes and vitamin D deficiency and dyslipidemia.  She is complaining of having had dizzy spells last night and dysphagia as well as tenderness in the anterior aspect of her neck.    /72  Resp 16  Ht 152.4 cm (60\")  Wt 57 kg (125 lb 9.6 oz)  BMI 24.53 kg/m2     Allergies   Allergen Reactions   • Aspirin Other (See Comments)     WHEEZING  WHEEZING  WHEEZING   • Doxycycline Other (See Comments)   • Sertraline Diarrhea, Nausea And Vomiting and Other (See Comments)     CHEST PAIN   CHEST PAIN   CHEST PAIN    • Iodinated Diagnostic Agents Rash       Current Outpatient Prescriptions:   •  albuterol (VENTOLIN HFA) 108 (90 BASE) MCG/ACT inhaler, As Needed., Disp: , Rfl:   •  ergocalciferol (DRISDOL) 76051 UNITS capsule, TAKE 1 CAPSULE PO ONCE A WEEK, Disp: 26 capsule, Rfl: 0  •  esomeprazole (NEXIUM) 40 MG capsule, Take 40 mg by mouth Daily., Disp: , Rfl:   •  ipratropium-albuterol (DUO-NEB) 0.5-2.5 mg/mL nebulizer, Inhale 3 mL Every 6 (Six) Hours., Disp: , Rfl:   •  loratadine (CLARITIN) 10 MG tablet, Take 10 mg by mouth Every Night., Disp: , Rfl:   •  nitroglycerin (NITROSTAT) 0.4 MG SL tablet, Place 0.4 mg under the tongue Every 5 (Five) Minutes. prn, Disp: , Rfl:   •  potassium chloride (K-DUR,KLOR-CON) 10 MEQ CR tablet, Take 10 mEq by mouth Daily., Disp: , Rfl: 3  •  theophylline (THEODUR) 300 MG 12 hr tablet, Take 300 mg by mouth 2 (Two) Times a Day., Disp: , Rfl:   •  triamterene-hydrochlorothiazide (DYAZIDE) 37.5-25 MG per capsule, Take 1 capsule by mouth Daily., Disp: , " Rfl:       The following portions of the patient's history were reviewed and updated as appropriate: allergies, current medications, past family history, past medical history, past social history, past surgical history and problem list.    Review of Systems   Constitutional: Negative.    HENT: Positive for sore throat and trouble swallowing.    Eyes: Negative.    Respiratory: Negative.    Cardiovascular: Negative.    Gastrointestinal: Negative.    Endocrine: Negative.    Genitourinary: Negative.    Musculoskeletal: Negative.    Skin: Negative.    Allergic/Immunologic: Negative.    Neurological: Positive for dizziness.   Hematological: Negative.    Psychiatric/Behavioral: Negative.        Objective   Physical Exam   Constitutional: She is oriented to person, place, and time. She appears well-developed and well-nourished. No distress.   HENT:   Head: Normocephalic and atraumatic.   Right Ear: External ear normal.   Left Ear: External ear normal.   Nose: Nose normal.   Mouth/Throat: Oropharynx is clear and moist. No oropharyngeal exudate.   Increased congestion in right nostril with turbinate hypertrophy.   Eyes: Conjunctivae and EOM are normal. Pupils are equal, round, and reactive to light. Right eye exhibits no discharge. Left eye exhibits no discharge. No scleral icterus.   Neck: Trachea normal, normal range of motion and full passive range of motion without pain. Neck supple. No JVD present. No tracheal tenderness present. Carotid bruit is not present. No tracheal deviation, no edema and no erythema present. Thyromegaly present. No thyroid mass present.   Soft multiple nodules on both lobes of the thyroid more prominent on the right side.  They move freely with swallowing and are nontender.   Cardiovascular: Normal rate, regular rhythm, normal heart sounds and intact distal pulses.  Exam reveals no gallop and no friction rub.    No murmur heard.  Pulmonary/Chest: Effort normal and breath sounds normal. No stridor.  No respiratory distress. She has no wheezes. She has no rales.   Abdominal: Soft. Bowel sounds are normal. She exhibits no distension and no mass. There is no tenderness. There is no rebound and no guarding. No hernia.   Musculoskeletal: Normal range of motion. She exhibits no edema, tenderness or deformity.   Lymphadenopathy:     She has no cervical adenopathy.   Neurological: She is alert and oriented to person, place, and time. She has normal reflexes. She displays normal reflexes. No cranial nerve deficit. She exhibits normal muscle tone. Coordination normal.   Skin: Skin is warm and dry. No rash noted. She is not diaphoretic. No erythema. No pallor.   Psychiatric: She has a normal mood and affect. Her behavior is normal. Judgment and thought content normal.   Nursing note and vitals reviewed.        Assessment/Plan   Diagnoses and all orders for this visit:    Prediabetes  -     T3, Free; Future  -     T4 & TSH ([facility]); Future  -     T4, Free; Future  -     Comprehensive Thyroglobulin; Future  -     Comprehensive Metabolic Panel; Future  -     Uric Acid; Future  -     Vitamin D 25 Hydroxy; Future  -     C-Peptide; Future  -     Hemoglobin A1c; Future  -     Lipoprotein NMR; Future    Essential hypertension  -     T3, Free; Future  -     T4 & TSH ([facility]); Future  -     T4, Free; Future  -     Comprehensive Thyroglobulin; Future  -     Comprehensive Metabolic Panel; Future  -     Uric Acid; Future  -     Vitamin D 25 Hydroxy; Future  -     C-Peptide; Future  -     Hemoglobin A1c; Future  -     Lipoprotein NMR; Future    Hypercholesterolemia  -     T3, Free; Future  -     T4 & TSH ([facility]); Future  -     T4, Free; Future  -     Comprehensive Thyroglobulin; Future  -     Comprehensive Metabolic Panel; Future  -     Uric Acid; Future  -     Vitamin D 25 Hydroxy; Future  -     C-Peptide; Future  -     Hemoglobin A1c; Future  -     Lipoprotein NMR; Future    Vitamin D deficiency  -     T3, Free; Future  -     T4  & TSH ([facility]); Future  -     T4, Free; Future  -     Comprehensive Thyroglobulin; Future  -     Comprehensive Metabolic Panel; Future  -     Uric Acid; Future  -     Vitamin D 25 Hydroxy; Future  -     C-Peptide; Future  -     Hemoglobin A1c; Future  -     Lipoprotein NMR; Future    Chronic fatigue  -     T3, Free; Future  -     T4 & TSH ([facility]); Future  -     T4, Free; Future  -     Comprehensive Thyroglobulin; Future  -     Comprehensive Metabolic Panel; Future  -     Uric Acid; Future  -     Vitamin D 25 Hydroxy; Future  -     C-Peptide; Future  -     Hemoglobin A1c; Future  -     Lipoprotein NMR; Future    Nontoxic multinodular goiter  -     US Thyroid; Future  -     T3, Free; Future  -     T4 & TSH ([facility]); Future  -     T4, Free; Future  -     Comprehensive Thyroglobulin; Future  -     Comprehensive Metabolic Panel; Future  -     Uric Acid; Future  -     Vitamin D 25 Hydroxy; Future  -     C-Peptide; Future  -     Hemoglobin A1c; Future  -     Lipoprotein NMR; Future    Other orders  -     ergocalciferol (DRISDOL) 43836 units capsule; TAKE 1 CAPSULE PO ONCE A WEEK  -     Discontinue: ULORIC 80 MG tablet tablet; Take 1 tablet by mouth Daily.  -     Discontinue: fluticasone (VERAMYST) 27.5 MCG/SPRAY nasal spray; 2 sprays into each nostril Daily.  -     ULORIC 80 MG tablet tablet; Take 1 tablet by mouth Daily.  -     fluticasone (VERAMYST) 27.5 MCG/SPRAY nasal spray; 2 sprays into each nostril Daily.               In summary I saw and examined this 72-year-old female for above-mentioned problems.  I reviewed her laboratory evaluation of 02/09/2018 and provided her with a hard copy of it.  She is clinically and metabolically stable however her uric acid is elevated and so I will start her on Uloric 80 mg daily.  Also for her upper respiratory congestion and I will give her Veramyst 1 squirt in each nostrils twice daily.  She will see Ms. Moriah Enriquez in 6 months or sooner if needed with laboratory  evaluation prior to each office visit.

## 2018-03-01 DIAGNOSIS — R53.82 CHRONIC FATIGUE: ICD-10-CM

## 2018-03-01 DIAGNOSIS — E04.2 MULTIPLE THYROID NODULES: ICD-10-CM

## 2018-03-01 DIAGNOSIS — I10 ESSENTIAL HYPERTENSION: ICD-10-CM

## 2018-03-01 DIAGNOSIS — E78.00 HYPERCHOLESTEROLEMIA: ICD-10-CM

## 2018-03-01 RX ORDER — IPRATROPIUM BROMIDE AND ALBUTEROL SULFATE 2.5; .5 MG/3ML; MG/3ML
SOLUTION RESPIRATORY (INHALATION)
Qty: 360 ML | Refills: 0 | Status: SHIPPED | OUTPATIENT
Start: 2018-03-01 | End: 2021-01-08

## 2018-03-07 ENCOUNTER — PRIOR AUTHORIZATION (OUTPATIENT)
Dept: ENDOCRINOLOGY | Age: 73
End: 2018-03-07

## 2018-03-07 NOTE — TELEPHONE ENCOUNTER
PTS PA FOR ULORIC HAS BEEN SUBMITTED [facility] ON 3/7/18 AND WAS APPROVED ON 3/9/18. PHARMACY WAS NOTIFIED

## 2018-03-09 RX ORDER — FEBUXOSTAT 80 MG/1
80 TABLET ORAL DAILY
Qty: 90 TABLET | Refills: 3 | Status: SHIPPED | OUTPATIENT
Start: 2018-03-09 | End: 2018-03-14

## 2018-03-14 RX ORDER — ALLOPURINOL 100 MG/1
100 TABLET ORAL DAILY
Qty: 30 TABLET | Refills: 5 | Status: SHIPPED | OUTPATIENT
Start: 2018-03-14 | End: 2018-09-12

## 2018-03-14 RX ORDER — ALLOPURINOL 100 MG/1
100 TABLET ORAL DAILY
Qty: 30 TABLET | Refills: 5 | OUTPATIENT
Start: 2018-03-14 | End: 2018-03-14 | Stop reason: SDUPTHER

## 2018-06-13 ENCOUNTER — HOSPITAL ENCOUNTER (OUTPATIENT)
Dept: CARDIOLOGY | Facility: HOSPITAL | Age: 73
Discharge: HOME | End: 2018-06-13
Attending: INTERNAL MEDICINE | Admitting: INTERNAL MEDICINE

## 2018-06-13 ENCOUNTER — OFFICE VISIT (OUTPATIENT)
Dept: CARDIOLOGY | Facility: CLINIC | Age: 73
End: 2018-06-13

## 2018-06-13 VITALS
BODY MASS INDEX: 27.08 KG/M2 | HEIGHT: 58 IN | HEART RATE: 88 BPM | DIASTOLIC BLOOD PRESSURE: 75 MMHG | SYSTOLIC BLOOD PRESSURE: 126 MMHG | WEIGHT: 129 LBS

## 2018-06-13 DIAGNOSIS — I10 ESSENTIAL HYPERTENSION: ICD-10-CM

## 2018-06-13 DIAGNOSIS — M79.89 LEG SWELLING: ICD-10-CM

## 2018-06-13 DIAGNOSIS — R06.02 SOB (SHORTNESS OF BREATH): Primary | ICD-10-CM

## 2018-06-13 DIAGNOSIS — E78.00 HYPERCHOLESTEROLEMIA: ICD-10-CM

## 2018-06-13 PROCEDURE — 0399T HC MYOCARDL STRAIN IMAG QUAN ASSMT PER SESS: CPT

## 2018-06-13 PROCEDURE — 99214 OFFICE O/P EST MOD 30 MIN: CPT | Performed by: INTERNAL MEDICINE

## 2018-06-13 PROCEDURE — 93306 TTE W/DOPPLER COMPLETE: CPT

## 2018-06-13 PROCEDURE — 93000 ELECTROCARDIOGRAM COMPLETE: CPT | Performed by: INTERNAL MEDICINE

## 2018-06-13 PROCEDURE — 93306 TTE W/DOPPLER COMPLETE: CPT | Performed by: INTERNAL MEDICINE

## 2018-06-13 PROCEDURE — 0399T ADULT TRANSTHORACIC ECHO COMPLETE W/ CONT IF NECESSARY PER PROTOCOL: CPT | Performed by: INTERNAL MEDICINE

## 2018-06-13 NOTE — PROGRESS NOTES
" Subjective:       Mary Boyle is a 72 y.o. female who here for follow up    CC  LEG SWELLING  HPI  72-year-old female with known history of benign essential arterial hypertension, hypercholesterolemia as well as shortness of breath has been complaining of the bilateral leg swelling mild-to-moderate in intensity     Problem List Items Addressed This Visit        Cardiovascular and Mediastinum    Essential hypertension    Relevant Orders    Stress Test With Myocardial Perfusion One Day    Adult Transthoracic Echo Complete W/ Cont if Necessary Per Protocol (Completed)    Hypercholesterolemia      Other Visit Diagnoses     SOB (shortness of breath)    -  Primary    Relevant Orders    Stress Test With Myocardial Perfusion One Day    Adult Transthoracic Echo Complete W/ Cont if Necessary Per Protocol (Completed)    Leg swelling            .    The following portions of the patient's history were reviewed and updated as appropriate: allergies, current medications, past family history, past medical history, past social history, past surgical history and problem list.    Past Medical History:   Diagnosis Date   • Arthritis    • Asthma    • Chest pain    • Depression    • GERD (gastroesophageal reflux disease)    • Hiatal hernia    • Hypokalemia    • Mild mitral regurgitation    • Thyroid nodule    • Tricuspid regurgitation    • Vitamin D deficiency     reports that she has quit smoking. She has never used smokeless tobacco. She reports that she does not drink alcohol or use drugs.  Family History   Problem Relation Age of Onset   • Heart attack Other    • Heart disease Other        Review of Systems  Constitutional: No wt loss, fever, fatigue  Gastrointestinal: No nausea, abdominal pain  Behavioral/Psych: No insomnia or anxiety   Cardiovascular Leg swelling  Objective:       Physical Exam           Physical Exam  /75 (BP Location: Left arm, Patient Position: Sitting)   Pulse 88   Ht 147.3 cm (58\")   Wt 58.5 kg " (129 lb)   BMI 26.96 kg/m²     General appearance: NAD, conversant   Eyes: anicteric sclerae, moist conjunctivae; no lid-lag; PERRLA   HENT: Atraumatic; oropharynx clear with moist mucous membranes and no mucosal ulcerations;  normal hard and soft palate   Neck: Trachea midline; FROM, supple, no thyromegaly or lymphadenopathy   Lungs: CTA, with normal respiratory effort and no intercostal retractions   CV: S1-S2 regular, no murmurs, no rub, no gallop   Abdomen: Soft, non-tender; no masses or HSM   Extremities: No peripheral edema or extremity lymphadenopathy  Skin: Normal temperature, turgor and texture; no rash, ulcers or subcutaneous nodules   Psych: Appropriate affect, alert and oriented to person, place and time           Cardiographics  @  ECG 12 Lead  Date/Time: 6/13/2018 11:48 AM  Performed by: TRICIA TREJO  Authorized by: TRICIA TREJO   Comparison: compared with previous ECG   Similar to previous ECG  Rhythm: sinus rhythm  Conduction: incomplete RBBB  Clinical impression: non-specific ECG            Echocardiogram:        Current Outpatient Prescriptions:   •  albuterol (VENTOLIN HFA) 108 (90 BASE) MCG/ACT inhaler, As Needed., Disp: , Rfl:   •  allopurinol (ZYLOPRIM) 100 MG tablet, Take 1 tablet by mouth Daily., Disp: 30 tablet, Rfl: 5  •  ergocalciferol (DRISDOL) 79068 units capsule, TAKE 1 CAPSULE PO ONCE A WEEK, Disp: 26 capsule, Rfl: 3  •  esomeprazole (NEXIUM) 40 MG capsule, Take 40 mg by mouth Daily., Disp: , Rfl:   •  fluticasone (VERAMYST) 27.5 MCG/SPRAY nasal spray, 2 sprays into each nostril Daily., Disp: 1 each, Rfl: 12  •  ipratropium-albuterol (DUO-NEB) 0.5-2.5 mg/mL nebulizer, 2 sprays into each nostril daily, Disp: 360 mL, Rfl: 0  •  loratadine (CLARITIN) 10 MG tablet, Take 10 mg by mouth Every Night., Disp: , Rfl:   •  nitroglycerin (NITROSTAT) 0.4 MG SL tablet, Place 0.4 mg under the tongue Every 5 (Five) Minutes. prn, Disp: , Rfl:   •  potassium chloride (K-DUR,KLOR-CON)  10 MEQ CR tablet, Take 10 mEq by mouth Daily., Disp: , Rfl: 3  •  theophylline (THEODUR) 300 MG 12 hr tablet, Take 300 mg by mouth 2 (Two) Times a Day., Disp: , Rfl:   •  triamterene-hydrochlorothiazide (DYAZIDE) 37.5-25 MG per capsule, Take 1 capsule by mouth Daily., Disp: , Rfl:    Assessment:        Patient Active Problem List   Diagnosis   • Chest pain   • Colon polyp   • COPD (chronic obstructive pulmonary disease)   • Diverticulosis   • Essential hypertension   • GERD without esophagitis   • Hypercholesterolemia   • Osteopenia   • Multiple thyroid nodules   • Chronic fatigue   • Prediabetes   • Vitamin D deficiency   • Renal insufficiency               Plan:            ICD-10-CM ICD-9-CM   1. SOB (shortness of breath) R06.02 786.05   2. Essential hypertension I10 401.9   3. Hypercholesterolemia E78.00 272.0   4. Leg swelling M79.89 729.81     1. SOB (shortness of breath)  Considering the patient's symptoms as well as clinical situation and  EKG findings, along with cardiac risk factors, ischemic workup is necessary to rule out ischemic cardiomyopathy, stress induced arrhythmias, and functional capacity for diagnosis as well as prognostic consideration    - Stress Test With Myocardial Perfusion One Day  - Adult Transthoracic Echo Complete W/ Cont if Necessary Per Protocol    2. Essential hypertension  Considering patient's medical condition as well as the risk factors, patient will require echocardiogram for further evaluation for the LV function, four-chamber evaluation, including the pressures, valvular function and  pericardial disease and pericardial effusion    - Stress Test With Myocardial Perfusion One Day  - Adult Transthoracic Echo Complete W/ Cont if Necessary Per Protocol    3. Hypercholesterolemia  Risk of the hyperlipidemia, importance of the treatment has been explained    Pros and cons of the statins has been explained    Regular blood workup as well as side effects including the liver failure,  myelopathy death has been explained          4. Leg swelling  Mary Boyle has been complaining of the leg swelling, appears dependent pedal edema, significantly contributed with a venous insufficiency.    Strong recommendations of elevating the legs as well as using support hoses, along with the control of fluids and salt restriction has been explained in details         STOCKINGS    CASTILLO CARDIOLYTE, ECHO    SEE US 2-4 WKS  COUNSELING:    Mary PalmerMoraeling was given to patient for the following topics: diagnostic results, risk factor reductions, impressions, risks and benefits of treatment options and importance of treatment compliance .       SMOKING COUNSELING:    Counseling given: Not Answered      EMR Dragon/Transcription disclaimer:   Much of this encounter note is an electronic transcription/translation of spoken language to printed text. The electronic translation of spoken language may permit erroneous, or at times, nonsensical words or phrases to be inadvertently transcribed; Although I have reviewed the note for such errors, some may still exist.

## 2018-06-14 LAB
BH CV ECHO MEAS - ACS: 1.8 CM
BH CV ECHO MEAS - AO MAX PG (FULL): 1 MMHG
BH CV ECHO MEAS - AO MAX PG: 5.3 MMHG
BH CV ECHO MEAS - AO MEAN PG (FULL): 1 MMHG
BH CV ECHO MEAS - AO MEAN PG: 3 MMHG
BH CV ECHO MEAS - AO ROOT AREA (BSA CORRECTED): 2
BH CV ECHO MEAS - AO ROOT AREA: 7.1 CM^2
BH CV ECHO MEAS - AO ROOT DIAM: 3 CM
BH CV ECHO MEAS - AO V2 MAX: 115 CM/SEC
BH CV ECHO MEAS - AO V2 MEAN: 84.6 CM/SEC
BH CV ECHO MEAS - AO V2 VTI: 22.5 CM
BH CV ECHO MEAS - AVA(I,A): 2.4 CM^2
BH CV ECHO MEAS - AVA(I,D): 2.4 CM^2
BH CV ECHO MEAS - AVA(V,A): 2.3 CM^2
BH CV ECHO MEAS - AVA(V,D): 2.3 CM^2
BH CV ECHO MEAS - BSA(HAYCOCK): 1.6 M^2
BH CV ECHO MEAS - BSA: 1.5 M^2
BH CV ECHO MEAS - BZI_BMI: 27 KILOGRAMS/M^2
BH CV ECHO MEAS - BZI_METRIC_HEIGHT: 147.3 CM
BH CV ECHO MEAS - BZI_METRIC_WEIGHT: 58.5 KG
BH CV ECHO MEAS - CONTRAST EF (2CH): 58.6 ML/M^2
BH CV ECHO MEAS - CONTRAST EF 4CH: 64.7 ML/M^2
BH CV ECHO MEAS - EDV(CUBED): 97.3 ML
BH CV ECHO MEAS - EDV(MOD-SP2): 29 ML
BH CV ECHO MEAS - EDV(MOD-SP4): 51 ML
BH CV ECHO MEAS - EDV(TEICH): 97.3 ML
BH CV ECHO MEAS - EF(CUBED): 79.8 %
BH CV ECHO MEAS - EF(MOD-BP): 62 %
BH CV ECHO MEAS - EF(MOD-SP2): 58.6 %
BH CV ECHO MEAS - EF(MOD-SP4): 64.7 %
BH CV ECHO MEAS - EF(TEICH): 72.2 %
BH CV ECHO MEAS - ESV(CUBED): 19.7 ML
BH CV ECHO MEAS - ESV(MOD-SP2): 12 ML
BH CV ECHO MEAS - ESV(MOD-SP4): 18 ML
BH CV ECHO MEAS - ESV(TEICH): 27 ML
BH CV ECHO MEAS - FS: 41.3 %
BH CV ECHO MEAS - IVS/LVPW: 0.82
BH CV ECHO MEAS - IVSD: 0.9 CM
BH CV ECHO MEAS - LA DIMENSION: 3.9 CM
BH CV ECHO MEAS - LA/AO: 1.3
BH CV ECHO MEAS - LAT PEAK E' VEL: 7.4 CM/SEC
BH CV ECHO MEAS - LV DIASTOLIC VOL/BSA (35-75): 33.7 ML/M^2
BH CV ECHO MEAS - LV MASS(C)D: 158.8 GRAMS
BH CV ECHO MEAS - LV MASS(C)DI: 105.1 GRAMS/M^2
BH CV ECHO MEAS - LV MAX PG: 4.2 MMHG
BH CV ECHO MEAS - LV MEAN PG: 2 MMHG
BH CV ECHO MEAS - LV SYSTOLIC VOL/BSA (12-30): 11.9 ML/M^2
BH CV ECHO MEAS - LV V1 MAX: 103 CM/SEC
BH CV ECHO MEAS - LV V1 MEAN: 67.2 CM/SEC
BH CV ECHO MEAS - LV V1 VTI: 21 CM
BH CV ECHO MEAS - LVIDD: 4.6 CM
BH CV ECHO MEAS - LVIDS: 2.7 CM
BH CV ECHO MEAS - LVLD AP2: 5.3 CM
BH CV ECHO MEAS - LVLD AP4: 6.7 CM
BH CV ECHO MEAS - LVLS AP2: 4.7 CM
BH CV ECHO MEAS - LVLS AP4: 5.6 CM
BH CV ECHO MEAS - LVOT AREA (M): 2.5 CM^2
BH CV ECHO MEAS - LVOT AREA: 2.5 CM^2
BH CV ECHO MEAS - LVOT DIAM: 1.8 CM
BH CV ECHO MEAS - LVPWD: 1.1 CM
BH CV ECHO MEAS - MED PEAK E' VEL: 6.5 CM/SEC
BH CV ECHO MEAS - MV A DUR: 0.19 SEC
BH CV ECHO MEAS - MV A MAX VEL: 135 CM/SEC
BH CV ECHO MEAS - MV DEC SLOPE: 286 CM/SEC^2
BH CV ECHO MEAS - MV DEC TIME: 0.17 SEC
BH CV ECHO MEAS - MV E MAX VEL: 75.7 CM/SEC
BH CV ECHO MEAS - MV E/A: 0.56
BH CV ECHO MEAS - MV MAX PG: 8.2 MMHG
BH CV ECHO MEAS - MV MEAN PG: 2 MMHG
BH CV ECHO MEAS - MV P1/2T MAX VEL: 78.7 CM/SEC
BH CV ECHO MEAS - MV P1/2T: 80.6 MSEC
BH CV ECHO MEAS - MV V2 MAX: 143 CM/SEC
BH CV ECHO MEAS - MV V2 MEAN: 61.5 CM/SEC
BH CV ECHO MEAS - MV V2 VTI: 21.9 CM
BH CV ECHO MEAS - MVA P1/2T LCG: 2.8 CM^2
BH CV ECHO MEAS - MVA(P1/2T): 2.7 CM^2
BH CV ECHO MEAS - MVA(VTI): 2.4 CM^2
BH CV ECHO MEAS - PA ACC TIME: 0.13 SEC
BH CV ECHO MEAS - PA MAX PG (FULL): 1.2 MMHG
BH CV ECHO MEAS - PA MAX PG: 3.7 MMHG
BH CV ECHO MEAS - PA PR(ACCEL): 19.6 MMHG
BH CV ECHO MEAS - PA V2 MAX: 96.4 CM/SEC
BH CV ECHO MEAS - PULM A REVS DUR: 0.11 SEC
BH CV ECHO MEAS - PULM A REVS VEL: 37.3 CM/SEC
BH CV ECHO MEAS - PULM DIAS VEL: 41.2 CM/SEC
BH CV ECHO MEAS - PULM S/D: 0.86
BH CV ECHO MEAS - PULM SYS VEL: 35.4 CM/SEC
BH CV ECHO MEAS - PVA(V,A): 1.9 CM^2
BH CV ECHO MEAS - PVA(V,D): 1.9 CM^2
BH CV ECHO MEAS - QP/QS: 0.69
BH CV ECHO MEAS - RV MAX PG: 2.5 MMHG
BH CV ECHO MEAS - RV MEAN PG: 1 MMHG
BH CV ECHO MEAS - RV V1 MAX: 79.8 CM/SEC
BH CV ECHO MEAS - RV V1 MEAN: 55.9 CM/SEC
BH CV ECHO MEAS - RV V1 VTI: 16.2 CM
BH CV ECHO MEAS - RVDD: 2.2 CM
BH CV ECHO MEAS - RVOT AREA: 2.3 CM^2
BH CV ECHO MEAS - RVOT DIAM: 1.7 CM
BH CV ECHO MEAS - SI(AO): 105.2 ML/M^2
BH CV ECHO MEAS - SI(CUBED): 51.4 ML/M^2
BH CV ECHO MEAS - SI(LVOT): 35.4 ML/M^2
BH CV ECHO MEAS - SI(MOD-SP2): 11.2 ML/M^2
BH CV ECHO MEAS - SI(MOD-SP4): 21.8 ML/M^2
BH CV ECHO MEAS - SI(TEICH): 46.5 ML/M^2
BH CV ECHO MEAS - SV(AO): 159 ML
BH CV ECHO MEAS - SV(CUBED): 77.7 ML
BH CV ECHO MEAS - SV(LVOT): 53.4 ML
BH CV ECHO MEAS - SV(MOD-SP2): 17 ML
BH CV ECHO MEAS - SV(MOD-SP4): 33 ML
BH CV ECHO MEAS - SV(RVOT): 36.8 ML
BH CV ECHO MEAS - SV(TEICH): 70.3 ML
BH CV ECHO MEAS - TAPSE (>1.6): 2 CM2
BH CV ECHO MEASUREMENTS AVERAGE E/E' RATIO: 10.89
BH CV XLRA - RV BASE: 2.2 CM
BH CV XLRA - RV LENGTH: 5.4 CM
BH CV XLRA - RV MID: 1.7 CM
BH CV XLRA - TDI S': 10.2 CM/SEC
LEFT ATRIUM VOLUME INDEX: 22 ML/M2
MAXIMAL PREDICTED HEART RATE: 147 BPM
STRESS TARGET HR: 125 BPM

## 2018-07-01 ENCOUNTER — APPOINTMENT (OUTPATIENT)
Dept: GENERAL RADIOLOGY | Facility: HOSPITAL | Age: 73
End: 2018-07-01

## 2018-07-01 ENCOUNTER — HOSPITAL ENCOUNTER (EMERGENCY)
Facility: HOSPITAL | Age: 73
Discharge: HOME | End: 2018-07-01
Attending: EMERGENCY MEDICINE | Admitting: EMERGENCY MEDICINE

## 2018-07-01 VITALS
OXYGEN SATURATION: 99 % | TEMPERATURE: 98.8 F | HEART RATE: 81 BPM | WEIGHT: 129 LBS | BODY MASS INDEX: 27.08 KG/M2 | RESPIRATION RATE: 15 BRPM | DIASTOLIC BLOOD PRESSURE: 93 MMHG | HEIGHT: 58 IN | SYSTOLIC BLOOD PRESSURE: 154 MMHG

## 2018-07-01 DIAGNOSIS — I45.10 NEW ONSET RIGHT BUNDLE BRANCH BLOCK (RBBB): ICD-10-CM

## 2018-07-01 DIAGNOSIS — R07.89 ATYPICAL CHEST PAIN: Primary | ICD-10-CM

## 2018-07-01 LAB
ALBUMIN SERPL-MCNC: 4.4 G/DL (ref 3.5–5.2)
ALBUMIN/GLOB SERPL: 1.7 G/DL
ALP SERPL-CCNC: 73 U/L (ref 39–117)
ALT SERPL W P-5'-P-CCNC: 16 U/L (ref 1–33)
ANION GAP SERPL CALCULATED.3IONS-SCNC: 14.8 MMOL/L
AST SERPL-CCNC: 21 U/L (ref 1–32)
BACTERIA UR QL AUTO: NORMAL /HPF
BASOPHILS # BLD AUTO: 0.01 10*3/MM3 (ref 0–0.2)
BASOPHILS NFR BLD AUTO: 0.1 % (ref 0–1.5)
BILIRUB SERPL-MCNC: 0.4 MG/DL (ref 0.1–1.2)
BILIRUB UR QL STRIP: NEGATIVE
BUN BLD-MCNC: 21 MG/DL (ref 8–23)
BUN/CREAT SERPL: 17.1 (ref 7–25)
CALCIUM SPEC-SCNC: 9.9 MG/DL (ref 8.6–10.5)
CHLORIDE SERPL-SCNC: 102 MMOL/L (ref 98–107)
CLARITY UR: CLEAR
CO2 SERPL-SCNC: 24.2 MMOL/L (ref 22–29)
COLOR UR: YELLOW
CREAT BLD-MCNC: 1.23 MG/DL (ref 0.57–1)
DEPRECATED RDW RBC AUTO: 47.6 FL (ref 37–54)
EOSINOPHIL # BLD AUTO: 0.04 10*3/MM3 (ref 0–0.7)
EOSINOPHIL NFR BLD AUTO: 0.5 % (ref 0.3–6.2)
ERYTHROCYTE [DISTWIDTH] IN BLOOD BY AUTOMATED COUNT: 15.4 % (ref 11.7–13)
GFR SERPL CREATININE-BSD FRML MDRD: 43 ML/MIN/1.73
GLOBULIN UR ELPH-MCNC: 2.6 GM/DL
GLUCOSE BLD-MCNC: 86 MG/DL (ref 65–99)
GLUCOSE UR STRIP-MCNC: NEGATIVE MG/DL
HCT VFR BLD AUTO: 39.6 % (ref 35.6–45.5)
HGB BLD-MCNC: 13 G/DL (ref 11.9–15.5)
HGB UR QL STRIP.AUTO: ABNORMAL
HOLD SPECIMEN: NORMAL
HOLD SPECIMEN: NORMAL
HYALINE CASTS UR QL AUTO: NORMAL /LPF
IMM GRANULOCYTES # BLD: 0.01 10*3/MM3 (ref 0–0.03)
IMM GRANULOCYTES NFR BLD: 0.1 % (ref 0–0.5)
KETONES UR QL STRIP: NEGATIVE
LEUKOCYTE ESTERASE UR QL STRIP.AUTO: ABNORMAL
LYMPHOCYTES # BLD AUTO: 1.98 10*3/MM3 (ref 0.9–4.8)
LYMPHOCYTES NFR BLD AUTO: 24.6 % (ref 19.6–45.3)
MCH RBC QN AUTO: 27.8 PG (ref 26.9–32)
MCHC RBC AUTO-ENTMCNC: 32.8 G/DL (ref 32.4–36.3)
MCV RBC AUTO: 84.8 FL (ref 80.5–98.2)
MONOCYTES # BLD AUTO: 0.47 10*3/MM3 (ref 0.2–1.2)
MONOCYTES NFR BLD AUTO: 5.8 % (ref 5–12)
NEUTROPHILS # BLD AUTO: 5.54 10*3/MM3 (ref 1.9–8.1)
NEUTROPHILS NFR BLD AUTO: 69 % (ref 42.7–76)
NITRITE UR QL STRIP: NEGATIVE
NT-PROBNP SERPL-MCNC: 301.4 PG/ML (ref 5–900)
PH UR STRIP.AUTO: 7.5 [PH] (ref 5–8)
PLATELET # BLD AUTO: 257 10*3/MM3 (ref 140–500)
PMV BLD AUTO: 10.7 FL (ref 6–12)
POTASSIUM BLD-SCNC: 3.5 MMOL/L (ref 3.5–5.2)
PROT SERPL-MCNC: 7 G/DL (ref 6–8.5)
PROT UR QL STRIP: NEGATIVE
RBC # BLD AUTO: 4.67 10*6/MM3 (ref 3.9–5.2)
RBC # UR: NORMAL /HPF
REF LAB TEST METHOD: NORMAL
SODIUM BLD-SCNC: 141 MMOL/L (ref 136–145)
SP GR UR STRIP: 1.01 (ref 1–1.03)
SQUAMOUS #/AREA URNS HPF: NORMAL /HPF
TROPONIN T SERPL-MCNC: <0.01 NG/ML (ref 0–0.03)
TROPONIN T SERPL-MCNC: <0.01 NG/ML (ref 0–0.03)
UROBILINOGEN UR QL STRIP: ABNORMAL
WBC NRBC COR # BLD: 8.04 10*3/MM3 (ref 4.5–10.7)
WBC UR QL AUTO: NORMAL /HPF
WHOLE BLOOD HOLD SPECIMEN: NORMAL
WHOLE BLOOD HOLD SPECIMEN: NORMAL

## 2018-07-01 PROCEDURE — 71046 X-RAY EXAM CHEST 2 VIEWS: CPT

## 2018-07-01 PROCEDURE — 85025 COMPLETE CBC W/AUTO DIFF WBC: CPT | Performed by: NURSE PRACTITIONER

## 2018-07-01 PROCEDURE — 84484 ASSAY OF TROPONIN QUANT: CPT | Performed by: NURSE PRACTITIONER

## 2018-07-01 PROCEDURE — 81001 URINALYSIS AUTO W/SCOPE: CPT | Performed by: NURSE PRACTITIONER

## 2018-07-01 PROCEDURE — 93010 ELECTROCARDIOGRAM REPORT: CPT | Performed by: INTERNAL MEDICINE

## 2018-07-01 PROCEDURE — 93005 ELECTROCARDIOGRAM TRACING: CPT

## 2018-07-01 PROCEDURE — 83880 ASSAY OF NATRIURETIC PEPTIDE: CPT | Performed by: NURSE PRACTITIONER

## 2018-07-01 PROCEDURE — 99284 EMERGENCY DEPT VISIT MOD MDM: CPT

## 2018-07-01 PROCEDURE — 93005 ELECTROCARDIOGRAM TRACING: CPT | Performed by: EMERGENCY MEDICINE

## 2018-07-01 PROCEDURE — 80053 COMPREHEN METABOLIC PANEL: CPT | Performed by: NURSE PRACTITIONER

## 2018-07-01 PROCEDURE — 84484 ASSAY OF TROPONIN QUANT: CPT | Performed by: EMERGENCY MEDICINE

## 2018-07-01 RX ORDER — SODIUM CHLORIDE 0.9 % (FLUSH) 0.9 %
10 SYRINGE (ML) INJECTION AS NEEDED
Status: DISCONTINUED | OUTPATIENT
Start: 2018-07-01 | End: 2018-07-01 | Stop reason: HOSPADM

## 2018-07-01 NOTE — ED TRIAGE NOTES
Pt reports sharp midsternal to left chest pain radiates down left arm woke pt up at 0300 this morning reports intermittent chest pain states finally came to ER due to family and friends telling her to go to ER. Pt cardiologist [place]. Pt also reports SOA. BLE edema R>L

## 2018-07-01 NOTE — ED PROVIDER NOTES
" EMERGENCY DEPARTMENT ENCOUNTER    Room Number:    Date seen:  2018  Time seen: 2:21 PM  PCP: Babar Jones MD   Cardiologist: Dr. Shanks   Historian: Patient, Family      HPI:  Chief Complaint: Chest Pain  Context: Mary Boyle is a 72 y.o. female with h/o CHF who presents to the ED c/o intermittent episodes of sternal chest pain radiating to the left arm onset at about 03:00 AM today. Pt reports that the chest pain was abrupt in onset and is not currently present. Pt reports that her chest pain does not worsen with exertion, inspiration, or movement. Pt denies diaphoresis, nausea, vomiting, abdominal pain, and palpitations. However, pt reports that she has had mild dyspnea. Pt reports that about 2.5 weeks ago, pt had an outpatient ECHO performed that was unremarkable. Pt reports that she is scheduled for an appointment at her cardiologist's office on 18 for outpatient stress test. Pt has taken  81 mg ASA today. There are no other complaints at this time.     Pain Location: Sternum  Radiation: Left arm   Quality: \"aching\"  Intensity/Severity: Moderate  Duration: Onset at about 03:00 AM today  Onset quality: Abrupt in onset  Timing: Intermittent   Progression: Waxing and waning (but not present currently)  Aggravating Factors: Nothing  Alleviating Factors: Nothing  Previous Episodes: None   Treatment before arrival:  ASA  Associated Symptoms: Dyspnea (mild)        MEDICAL RECORD REVIEW    Pt has h/o CHF for which pt is followed by Dr. Shanks, cardiologist. Pt had an ECHO performed on 18 that showed:     · NORMAL GLOBAL STRAIN -18  · Left ventricular diastolic dysfunction (grade I) consistent with impaired relaxation.  · Left atrial cavity size is borderline dilated.  · Calculated EF = 62%  · There is no evidence of pericardial effusion.            PAST MEDICAL HISTORY  Active Ambulatory Problems     Diagnosis Date Noted   • Chest pain 10/20/2016   • Colon " polyp 02/15/2012   • COPD (chronic obstructive pulmonary disease) 02/15/2012   • Diverticulosis 02/15/2012   • Essential hypertension 03/24/2016   • GERD without esophagitis 03/24/2016   • Hypercholesterolemia 03/24/2016   • Osteopenia 10/20/2016   • Multiple thyroid nodules 10/20/2016   • Chronic fatigue 10/20/2016   • Prediabetes 10/29/2016   • Vitamin D deficiency 10/29/2016   • Renal insufficiency 10/29/2016     Resolved Ambulatory Problems     Diagnosis Date Noted   • Migraine 02/15/2012     Past Medical History:   Diagnosis Date   • Arthritis    • Asthma    • Chest pain    • Depression    • Ganglion cyst    • GERD (gastroesophageal reflux disease)    • Gout    • Hiatal hernia    • Hypokalemia    • Mild mitral regurgitation    • Thyroid nodule    • Tricuspid regurgitation    • Vitamin D deficiency          PAST SURGICAL HISTORY  Past Surgical History:   Procedure Laterality Date   • APPENDECTOMY     • BREAST LUMPECTOMY     • CHOLECYSTECTOMY     • GANGLION CYST EXCISION     • HERNIA REPAIR     • HYSTERECTOMY           FAMILY HISTORY  Family History   Problem Relation Age of Onset   • Heart attack Other    • Heart disease Other          SOCIAL HISTORY  Social History     Social History   • Marital status:      Spouse name: N/A   • Number of children: N/A   • Years of education: N/A     Occupational History   • Not on file.     Social History Main Topics   • Smoking status: Former Smoker   • Smokeless tobacco: Never Used   • Alcohol use No   • Drug use: No   • Sexual activity: Defer     Other Topics Concern   • Not on file     Social History Narrative   • No narrative on file         ALLERGIES  Aspirin; Doxycycline; Sertraline; and Iodinated diagnostic agents        REVIEW OF SYSTEMS  Review of Systems   Constitutional: Negative for chills and diaphoresis.   HENT: Negative for congestion, rhinorrhea and sore throat.    Eyes: Negative for pain.   Respiratory: Positive for shortness of breath (mild). Negative  for cough.    Cardiovascular: Positive for chest pain (radiating to the left arm). Negative for palpitations and leg swelling.   Gastrointestinal: Negative for abdominal pain, diarrhea, nausea and vomiting.   Genitourinary: Negative for difficulty urinating, dysuria, flank pain and frequency.   Musculoskeletal: Negative for neck pain and neck stiffness.   Skin: Negative for rash.   Neurological: Negative for dizziness, speech difficulty, weakness, light-headedness, numbness and headaches.   Psychiatric/Behavioral: Negative.    All other systems reviewed and are negative.           PHYSICAL EXAM  ED Triage Vitals   Temp Heart Rate Resp BP SpO2   07/01/18 1345 07/01/18 1345 07/01/18 1345 07/01/18 1400 07/01/18 1345   99.1 °F (37.3 °C) 116 18 136/97 97 %      Temp src Heart Rate Source Patient Position BP Location FiO2 (%)   07/01/18 1345 07/01/18 1345 -- -- --   Tympanic Monitor          Physical Exam   Constitutional: She is oriented to person, place, and time. No distress.   HENT:   Head: Normocephalic.   Mouth/Throat: Mucous membranes are normal.   Eyes: EOM are normal. Pupils are equal, round, and reactive to light.   Neck: Normal range of motion. Neck supple.   Cardiovascular: Regular rhythm, normal heart sounds and intact distal pulses.  Tachycardia present.    Pulmonary/Chest: Effort normal and breath sounds normal. No respiratory distress. She has no decreased breath sounds. She has no wheezes. She has no rhonchi. She has no rales.   Abdominal: Soft. There is no tenderness. There is no rebound and no guarding.   Musculoskeletal: Normal range of motion.   Neurological: She is alert and oriented to person, place, and time. She has normal sensation.   Skin: Skin is warm and dry.   Psychiatric: Mood and affect normal.   Nursing note and vitals reviewed.          LAB RESULTS  Recent Results (from the past 24 hour(s))   Light Blue Top    Collection Time: 07/01/18  2:08 PM   Result Value Ref Range    Extra Tube hold  for add-on    Green Top (Gel)    Collection Time: 07/01/18  2:08 PM   Result Value Ref Range    Extra Tube Hold for add-ons.    Lavender Top    Collection Time: 07/01/18  2:08 PM   Result Value Ref Range    Extra Tube hold for add-on    Gold Top - SST    Collection Time: 07/01/18  2:08 PM   Result Value Ref Range    Extra Tube Hold for add-ons.    Comprehensive Metabolic Panel    Collection Time: 07/01/18  2:08 PM   Result Value Ref Range    Glucose 86 65 - 99 mg/dL    BUN 21 8 - 23 mg/dL    Creatinine 1.23 (H) 0.57 - 1.00 mg/dL    Sodium 141 136 - 145 mmol/L    Potassium 3.5 3.5 - 5.2 mmol/L    Chloride 102 98 - 107 mmol/L    CO2 24.2 22.0 - 29.0 mmol/L    Calcium 9.9 8.6 - 10.5 mg/dL    Total Protein 7.0 6.0 - 8.5 g/dL    Albumin 4.40 3.50 - 5.20 g/dL    ALT (SGPT) 16 1 - 33 U/L    AST (SGOT) 21 1 - 32 U/L    Alkaline Phosphatase 73 39 - 117 U/L    Total Bilirubin 0.4 0.1 - 1.2 mg/dL    eGFR Non African Amer 43 (L) >60 mL/min/1.73    Globulin 2.6 gm/dL    A/G Ratio 1.7 g/dL    BUN/Creatinine Ratio 17.1 7.0 - 25.0    Anion Gap 14.8 mmol/L   BNP    Collection Time: 07/01/18  2:08 PM   Result Value Ref Range    proBNP 301.4 5.0 - 900.0 pg/mL   Troponin    Collection Time: 07/01/18  2:08 PM   Result Value Ref Range    Troponin T <0.010 0.000 - 0.030 ng/mL   CBC Auto Differential    Collection Time: 07/01/18  2:08 PM   Result Value Ref Range    WBC 8.04 4.50 - 10.70 10*3/mm3    RBC 4.67 3.90 - 5.20 10*6/mm3    Hemoglobin 13.0 11.9 - 15.5 g/dL    Hematocrit 39.6 35.6 - 45.5 %    MCV 84.8 80.5 - 98.2 fL    MCH 27.8 26.9 - 32.0 pg    MCHC 32.8 32.4 - 36.3 g/dL    RDW 15.4 (H) 11.7 - 13.0 %    RDW-SD 47.6 37.0 - 54.0 fl    MPV 10.7 6.0 - 12.0 fL    Platelets 257 140 - 500 10*3/mm3    Neutrophil % 69.0 42.7 - 76.0 %    Lymphocyte % 24.6 19.6 - 45.3 %    Monocyte % 5.8 5.0 - 12.0 %    Eosinophil % 0.5 0.3 - 6.2 %    Basophil % 0.1 0.0 - 1.5 %    Immature Grans % 0.1 0.0 - 0.5 %    Neutrophils, Absolute 5.54 1.90 - 8.10  10*3/mm3    Lymphocytes, Absolute 1.98 0.90 - 4.80 10*3/mm3    Monocytes, Absolute 0.47 0.20 - 1.20 10*3/mm3    Eosinophils, Absolute 0.04 0.00 - 0.70 10*3/mm3    Basophils, Absolute 0.01 0.00 - 0.20 10*3/mm3    Immature Grans, Absolute 0.01 0.00 - 0.03 10*3/mm3   Urinalysis With Microscopic If Indicated (No Culture) - Urine, Clean Catch    Collection Time: 07/01/18  2:51 PM   Result Value Ref Range    Color, UA Yellow Yellow, Straw    Appearance, UA Clear Clear    pH, UA 7.5 5.0 - 8.0    Specific Gravity, UA 1.014 1.005 - 1.030    Glucose, UA Negative Negative    Ketones, UA Negative Negative    Bilirubin, UA Negative Negative    Blood, UA Trace (A) Negative    Protein, UA Negative Negative    Leuk Esterase, UA Trace (A) Negative    Nitrite, UA Negative Negative    Urobilinogen, UA 0.2 E.U./dL 0.2 - 1.0 E.U./dL   Urinalysis, Microscopic Only - Urine, Clean Catch    Collection Time: 07/01/18  2:51 PM   Result Value Ref Range    RBC, UA 0-2 None Seen, 0-2 /HPF    WBC, UA 0-2 None Seen, 0-2 /HPF    Bacteria, UA None Seen None Seen /HPF    Squamous Epithelial Cells, UA 0-2 None Seen, 0-2 /HPF    Hyaline Casts, UA None Seen None Seen /LPF    Methodology Automated Microscopy    Troponin    Collection Time: 07/01/18  3:33 PM   Result Value Ref Range    Troponin T <0.010 0.000 - 0.030 ng/mL       Ordered the above labs and reviewed the results.        RADIOLOGY  XR Chest 2 View   Preliminary Result   There is no evidence for an active or acute cardiopulmonary   process.                 Ordered the above noted radiological studies. Reviewed by me in PACS.        PROCEDURES  Procedures        EKG:           EKG time: 13:50  Rhythm/Rate: Sinus tachycardia rate 110  P waves and MO: Normal P waves  QRS, axis: RBBB   ST and T waves: Nonspecific ST changes      Interpreted Contemporaneously by me, independently viewed  changed compared to prior 01/15/17 (RBBB is new)              MEDICATIONS GIVEN IN ER  Medications   sodium  chloride 0.9 % flush 10 mL (not administered)                   PROGRESS AND CONSULTS  ED Course as of Jul 01 1626   Sun Jul 01, 2018   1411 Woke up at 0300 due to sharp, intermittent stabbing chest pain.  No more SOA than normal.  Was overheated at work yesterday (works as  at [employer] part time).   [EP]      ED Course User Index  [EP] Joceline Leung, APRN     2:33 PM:  Blood work, CXR, and EKG ordered for further evaluation.     3:25 PM:  Rechecked pt. Pt is resting comfortably and appears in no acute distress. Informed pt that her CXR is negative acute. Pt's initial troponin is negative. Will repeat pt's troponin and discuss further course of care with the cardiologist. Pt agrees with plan.     3:27 PM:  Placed call to the cardiologist to discuss further course of care. Repeat troponin ordered.    3:30 PM:  Discussed the case with Dr. Shanks, cardiologist. He would like pt to call his office at 09:00 AM tomorrow to schedule an appointment.    4:09 PM:  Rechecked pt. Pt is resting comfortably and appears in no acute distress. Pt's HR is in the 90s. Informed pt that her serial troponins are negative. I have discussed the case with Dr. Shanks, cardiologist. Pt was instructed to call him tomorrow at about 09:00 AM to schedule an appointment in his office. Strict RTER warnings given. Pt agrees with plan for discharge.             MEDICAL DECISION MAKING      MDM  Number of Diagnoses or Management Options     Amount and/or Complexity of Data Reviewed  Clinical lab tests: reviewed and ordered (Serial troponins are negative.)  Tests in the radiology section of CPT®: ordered and reviewed (CXR:  PA and lateral chest radiographs were obtained. Comparison is  made to a prior examination dated 01/15/2017. The lungs are normal in  volume and are clear of consolidation. The cardiomediastinal silhouette  and pulmonary vasculature appear normal. Mild multilevel degenerative  disc disease is seen within the  thoracic spine. Osteopenia is noted.)  Tests in the medicine section of CPT®: reviewed and ordered (EKG interpreted.   )  Decide to obtain previous medical records or to obtain history from someone other than the patient: yes  Discuss the patient with other providers: yes (Discussed the case with Dr. Shanks, cardiologist.)    Patient Progress  Patient progress: stable             DIAGNOSIS  Final diagnoses:   Atypical chest pain   New onset right bundle branch block (RBBB)         DISPOSITION  Pt discharged.      DISCHARGE    Patient discharged in stable condition.    Reviewed implications of results, diagnosis, meds, responsibility to follow up, warning signs and symptoms of possible worsening, potential complications and reasons to return to ER.    Patient/Family voiced understanding of above instructions.    Discussed plan for discharge, as there is no emergent indication for admission. Pt/family is agreeable and understands need for follow up and repeat testing. Pt is aware that discharge does not mean that nothing is wrong but it indicates no emergency is present that requires admission and they must continue care with follow-up as given below or physician of their choice.     FOLLOW-UP  Stephanie Shanks MD  [address] [address]  [phone]    Schedule an appointment as soon as possible for a visit   Call tomorrow at 09:00 AM to schedule an appointment in Dr. Shanks's office.            Latest Documented Vital Signs:  As of 4:09 PM  BP- 147/77 HR- 97 Temp- 99.1 °F (37.3 °C) (Tympanic) O2 sat- 98%        --  Documentation assistance provided by ovidio Vallejo for Dr. Wilfred MD.  Information recorded by the ovidio was done at my direction and has been verified and validated by me.                  Paul Vallejo  07/01/18 5940       Umang Hardin MD  07/01/18 9122

## 2018-07-18 ENCOUNTER — HOSPITAL ENCOUNTER (OUTPATIENT)
Dept: CARDIOLOGY | Facility: HOSPITAL | Age: 73
Discharge: HOME | End: 2018-07-18
Attending: INTERNAL MEDICINE

## 2018-07-18 VITALS
SYSTOLIC BLOOD PRESSURE: 133 MMHG | RESPIRATION RATE: 20 BRPM | DIASTOLIC BLOOD PRESSURE: 68 MMHG | OXYGEN SATURATION: 99 % | BODY MASS INDEX: 26.45 KG/M2 | HEIGHT: 58 IN | HEART RATE: 69 BPM | WEIGHT: 126 LBS

## 2018-07-18 PROCEDURE — 93018 CV STRESS TEST I&R ONLY: CPT | Performed by: INTERNAL MEDICINE

## 2018-07-18 PROCEDURE — 93016 CV STRESS TEST SUPVJ ONLY: CPT | Performed by: INTERNAL MEDICINE

## 2018-07-18 PROCEDURE — 25010000002 REGADENOSON 0.4 MG/5ML SOLUTION: Performed by: INTERNAL MEDICINE

## 2018-07-18 PROCEDURE — 93017 CV STRESS TEST TRACING ONLY: CPT

## 2018-07-18 PROCEDURE — 78452 HT MUSCLE IMAGE SPECT MULT: CPT | Performed by: INTERNAL MEDICINE

## 2018-07-18 PROCEDURE — 0 TECHNETIUM SESTAMIBI: Performed by: INTERNAL MEDICINE

## 2018-07-18 PROCEDURE — 78452 HT MUSCLE IMAGE SPECT MULT: CPT

## 2018-07-18 PROCEDURE — A9500 TC99M SESTAMIBI: HCPCS | Performed by: INTERNAL MEDICINE

## 2018-07-18 RX ADMIN — TECHNETIUM TC 99M SESTAMIBI 1 DOSE: 1 INJECTION INTRAVENOUS at 08:27

## 2018-07-18 RX ADMIN — TECHNETIUM TC 99M SESTAMIBI 1 DOSE: 1 INJECTION INTRAVENOUS at 11:39

## 2018-07-18 RX ADMIN — REGADENOSON 0.4 MG: 0.08 INJECTION, SOLUTION INTRAVENOUS at 11:39

## 2018-07-19 LAB
BH CV STRESS BP STAGE 1: NORMAL
BH CV STRESS COMMENTS STAGE 1: NORMAL
BH CV STRESS DOSE REGADENOSON STAGE 1: 0.4
BH CV STRESS DURATION MIN STAGE 1: 2
BH CV STRESS DURATION SEC STAGE 1: 58
BH CV STRESS GRADE STAGE 1: 0
BH CV STRESS HR STAGE 1: 118
BH CV STRESS METS STAGE 1: 1.3
BH CV STRESS PROTOCOL 1: NORMAL
BH CV STRESS RECOVERY BP: NORMAL MMHG
BH CV STRESS RECOVERY HR: 98 BPM
BH CV STRESS RECOVERY O2: 100 %
BH CV STRESS SPEED STAGE 1: 0.5
BH CV STRESS STAGE 1: 1
LV EF NUC BP: 78 %
MAXIMAL PREDICTED HEART RATE: 147 BPM
PERCENT MAX PREDICTED HR: 80.27 %
STRESS BASELINE BP: NORMAL MMHG
STRESS BASELINE HR: 74 BPM
STRESS O2 SAT REST: 99 %
STRESS PERCENT HR: 94 %
STRESS POST ESTIMATED WORKLOAD: 1.3 METS
STRESS POST EXERCISE DUR MIN: 2 MIN
STRESS POST EXERCISE DUR SEC: 58 SEC
STRESS POST PEAK BP: NORMAL MMHG
STRESS POST PEAK HR: 118 BPM
STRESS TARGET HR: 125 BPM

## 2018-07-25 ENCOUNTER — OFFICE VISIT (OUTPATIENT)
Dept: CARDIOLOGY | Facility: CLINIC | Age: 73
End: 2018-07-25

## 2018-07-25 VITALS
DIASTOLIC BLOOD PRESSURE: 69 MMHG | WEIGHT: 130 LBS | SYSTOLIC BLOOD PRESSURE: 148 MMHG | HEART RATE: 69 BPM | BODY MASS INDEX: 25.52 KG/M2 | HEIGHT: 60 IN

## 2018-07-25 DIAGNOSIS — M79.89 LEG SWELLING: ICD-10-CM

## 2018-07-25 DIAGNOSIS — I10 ESSENTIAL HYPERTENSION: ICD-10-CM

## 2018-07-25 DIAGNOSIS — E78.00 HYPERCHOLESTEROLEMIA: Primary | ICD-10-CM

## 2018-07-25 PROCEDURE — 99213 OFFICE O/P EST LOW 20 MIN: CPT | Performed by: INTERNAL MEDICINE

## 2018-08-07 PROBLEM — M79.89 LEG SWELLING: Status: ACTIVE | Noted: 2018-08-07

## 2018-08-09 ENCOUNTER — RESULTS ENCOUNTER (OUTPATIENT)
Dept: ENDOCRINOLOGY | Age: 73
End: 2018-08-09

## 2018-08-09 DIAGNOSIS — E78.00 HYPERCHOLESTEROLEMIA: ICD-10-CM

## 2018-08-09 DIAGNOSIS — E55.9 VITAMIN D DEFICIENCY: ICD-10-CM

## 2018-08-09 DIAGNOSIS — R73.03 PREDIABETES: ICD-10-CM

## 2018-08-09 DIAGNOSIS — R53.82 CHRONIC FATIGUE: ICD-10-CM

## 2018-08-09 DIAGNOSIS — E04.2 NONTOXIC MULTINODULAR GOITER: ICD-10-CM

## 2018-08-09 DIAGNOSIS — I10 ESSENTIAL HYPERTENSION: ICD-10-CM

## 2018-09-01 LAB
25(OH)D3+25(OH)D2 SERPL-MCNC: 73.2 NG/ML (ref 30–100)
C PEPTIDE SERPL-MCNC: 6.8 NG/ML (ref 1.1–4.4)
CHOLEST SERPL-MCNC: 186 MG/DL (ref 100–199)
HBA1C MFR BLD: 5.6 % (ref 4.8–5.6)
HDL SERPL-SCNC: 41.3 UMOL/L
HDLC SERPL-MCNC: 84 MG/DL
INTERPRETATION: NORMAL
LDL SERPL QN: 21.4 NM
LDL SERPL-SCNC: 876 NMOL/L
LDL SMALL SERPL-SCNC: 130 NMOL/L
LDLC SERPL CALC-MCNC: 86 MG/DL (ref 0–99)
LP-IR SCORE SERPL: <25
Lab: NORMAL
T3FREE SERPL-MCNC: 2.6 PG/ML (ref 2–4.4)
T4 FREE SERPL-MCNC: 1.43 NG/DL (ref 0.93–1.7)
T4 SERPL-MCNC: 8.08 MCG/DL (ref 4.5–11.7)
THYROGLOB AB SERPL-ACNC: <1 IU/ML
THYROGLOB SERPL-MCNC: 6 NG/ML
THYROGLOB SERPL-MCNC: NORMAL NG/ML
TRIGL SERPL-MCNC: 78 MG/DL (ref 0–149)
TSH SERPL DL<=0.005 MIU/L-ACNC: 1.27 MIU/ML (ref 0.27–4.2)
URATE SERPL-MCNC: 5.9 MG/DL (ref 2.4–5.7)

## 2018-09-12 ENCOUNTER — OFFICE VISIT (OUTPATIENT)
Dept: ENDOCRINOLOGY | Age: 73
End: 2018-09-12

## 2018-09-12 VITALS
DIASTOLIC BLOOD PRESSURE: 80 MMHG | WEIGHT: 133.8 LBS | RESPIRATION RATE: 16 BRPM | BODY MASS INDEX: 26.13 KG/M2 | SYSTOLIC BLOOD PRESSURE: 128 MMHG

## 2018-09-12 DIAGNOSIS — M85.80 OSTEOPENIA, UNSPECIFIED LOCATION: ICD-10-CM

## 2018-09-12 DIAGNOSIS — I10 ESSENTIAL HYPERTENSION: ICD-10-CM

## 2018-09-12 DIAGNOSIS — R73.03 PREDIABETES: Primary | ICD-10-CM

## 2018-09-12 DIAGNOSIS — R93.7 ABNORMAL FINDINGS ON DIAGNOSTIC IMAGING OF OTHER PARTS OF MUSCULOSKELETAL SYSTEM: ICD-10-CM

## 2018-09-12 DIAGNOSIS — E55.9 VITAMIN D DEFICIENCY: ICD-10-CM

## 2018-09-12 DIAGNOSIS — E04.2 MULTIPLE THYROID NODULES: ICD-10-CM

## 2018-09-12 DIAGNOSIS — E78.00 HYPERCHOLESTEROLEMIA: ICD-10-CM

## 2018-09-12 DIAGNOSIS — R53.82 CHRONIC FATIGUE: ICD-10-CM

## 2018-09-12 PROCEDURE — 99214 OFFICE O/P EST MOD 30 MIN: CPT | Performed by: INTERNAL MEDICINE

## 2018-09-12 RX ORDER — ALLOPURINOL 300 MG/1
300 TABLET ORAL DAILY
Qty: 90 TABLET | Refills: 3 | Status: SHIPPED | OUTPATIENT
Start: 2018-09-12 | End: 2019-09-12

## 2018-09-12 RX ORDER — HYDROCHLOROTHIAZIDE 12.5 MG/1
12.5 TABLET ORAL
COMMUNITY
Start: 2018-08-15 | End: 2019-08-15

## 2018-09-12 RX ORDER — ERGOCALCIFEROL 1.25 MG/1
CAPSULE ORAL
Qty: 26 CAPSULE | Refills: 3 | Status: SHIPPED | OUTPATIENT
Start: 2018-09-12 | End: 2019-03-21 | Stop reason: SDUPTHER

## 2018-09-12 RX ORDER — METOPROLOL SUCCINATE 25 MG/1
25 TABLET, EXTENDED RELEASE ORAL
COMMUNITY
Start: 2018-08-29 | End: 2022-04-29 | Stop reason: HOSPADM

## 2018-09-12 NOTE — PROGRESS NOTES
Subjective   Mary Boyle is a 72 y.o. female seen for follow up for thyroid nodule, goiter, vit d deficiency, lab review. Patient denies any problems or concerns.     History of Present Illness this is a 72-year-old female known patient with multinodular goiter with hypertension and dyslipidemia as well as hyperuricemia and osteoporosis.  Over the course of last 6 months she has had no significant health problem for which to go to the emergency room or hospital.    /80   Resp 16   Wt 60.7 kg (133 lb 12.8 oz)   BMI 26.13 kg/m²      Allergies   Allergen Reactions   • Iodinated Diagnostic Agents Shortness Of Breath     Asthma attack and hives   • Aspirin Other (See Comments)     WHEEZING  WHEEZING  WHEEZING   • Doxycycline Other (See Comments)   • Sertraline Diarrhea, Nausea And Vomiting and Other (See Comments)     CHEST PAIN   CHEST PAIN   CHEST PAIN        Current Outpatient Prescriptions:   •  albuterol (VENTOLIN HFA) 108 (90 BASE) MCG/ACT inhaler, As Needed., Disp: , Rfl:   •  allopurinol (ZYLOPRIM) 100 MG tablet, Take 1 tablet by mouth Daily., Disp: 30 tablet, Rfl: 5  •  ergocalciferol (DRISDOL) 83449 units capsule, TAKE 1 CAPSULE PO ONCE A WEEK, Disp: 26 capsule, Rfl: 3  •  esomeprazole (NEXIUM) 40 MG capsule, Take 40 mg by mouth Daily., Disp: , Rfl:   •  hydrochlorothiazide (HYDRODIURIL) 12.5 MG tablet, Take 12.5 mg by mouth., Disp: , Rfl:   •  ipratropium-albuterol (DUO-NEB) 0.5-2.5 mg/mL nebulizer, 2 sprays into each nostril daily, Disp: 360 mL, Rfl: 0  •  loratadine (CLARITIN) 10 MG tablet, Take 10 mg by mouth Every Night., Disp: , Rfl:   •  metoprolol succinate XL (TOPROL-XL) 25 MG 24 hr tablet, Take 25 mg by mouth., Disp: , Rfl:   •  nitroglycerin (NITROSTAT) 0.4 MG SL tablet, Place 0.4 mg under the tongue Every 5 (Five) Minutes. prn, Disp: , Rfl:   •  potassium chloride (K-DUR,KLOR-CON) 10 MEQ CR tablet, Take 10 mEq by mouth Daily., Disp: , Rfl: 3  •  theophylline (THEODUR) 300 MG 12 hr  tablet, Take 200 mg by mouth 2 (Two) Times a Day., Disp: , Rfl:       The following portions of the patient's history were reviewed and updated as appropriate: allergies, current medications, past family history, past medical history, past social history, past surgical history and problem list.    Review of Systems   Constitutional: Negative.    HENT: Negative.    Eyes: Negative.    Respiratory: Negative.    Cardiovascular: Negative.    Gastrointestinal: Negative.    Endocrine: Negative.    Genitourinary: Negative.    Musculoskeletal: Negative.    Skin: Negative.    Allergic/Immunologic: Negative.    Neurological: Negative.    Hematological: Negative.    Psychiatric/Behavioral: Negative.        Objective   Physical Exam   Constitutional: She is oriented to person, place, and time. She appears well-developed and well-nourished. No distress.   HENT:   Head: Normocephalic and atraumatic.   Right Ear: External ear normal.   Left Ear: External ear normal.   Nose: Nose normal.   Mouth/Throat: Oropharynx is clear and moist. No oropharyngeal exudate.   Increased congestion in right nostril with turbinate hypertrophy.   Eyes: Pupils are equal, round, and reactive to light. Conjunctivae and EOM are normal. Right eye exhibits no discharge. Left eye exhibits no discharge. No scleral icterus.   Neck: Trachea normal, normal range of motion and full passive range of motion without pain. Neck supple. No JVD present. No tracheal tenderness present. Carotid bruit is not present. No tracheal deviation, no edema and no erythema present. Thyromegaly present. No thyroid mass present.   Soft multiple nodules on both lobes of the thyroid more prominent on the right side.  They move freely with swallowing and are nontender.   Cardiovascular: Normal rate, regular rhythm, normal heart sounds and intact distal pulses.  Exam reveals no gallop and no friction rub.    No murmur heard.  Pulmonary/Chest: Effort normal and breath sounds normal. No  stridor. No respiratory distress. She has no wheezes. She has no rales. She exhibits no tenderness.   Abdominal: Soft. Bowel sounds are normal. She exhibits no distension and no mass. There is no tenderness. There is no rebound and no guarding. No hernia.   Musculoskeletal: Normal range of motion. She exhibits no edema, tenderness or deformity.   Lymphadenopathy:     She has no cervical adenopathy.   Neurological: She is alert and oriented to person, place, and time. She has normal reflexes. She displays normal reflexes. No cranial nerve deficit or sensory deficit. She exhibits normal muscle tone. Coordination normal.   Skin: Skin is warm and dry. No rash noted. She is not diaphoretic. No erythema. No pallor.   Psychiatric: She has a normal mood and affect. Her behavior is normal. Judgment and thought content normal.   Nursing note and vitals reviewed.       Results for orders placed or performed in visit on 08/09/18   T3, Free   Result Value Ref Range    T3, Free 2.6 2.0 - 4.4 pg/mL   T4 & TSH ([facility])   Result Value Ref Range    TSH 1.270 0.270 - 4.200 mIU/mL    T4, Total 8.08 4.50 - 11.70 mcg/dL   T4, Free   Result Value Ref Range    Free T4 1.43 0.93 - 1.70 ng/dL   Comprehensive Thyroglobulin   Result Value Ref Range    Thyroglobulin Ab <1.0 IU/mL    Thyroglobulin 6.0 ng/mL    Thyroglobulin (TG-SUNIL) Comment ng/mL   Uric Acid   Result Value Ref Range    Uric Acid 5.9 (H) 2.4 - 5.7 mg/dL   Vitamin D 25 Hydroxy   Result Value Ref Range    25 Hydroxy, Vitamin D 73.2 30.0 - 100.0 ng/ml   C-Peptide   Result Value Ref Range    C-Peptide 6.8 (H) 1.1 - 4.4 ng/mL   Hemoglobin A1c   Result Value Ref Range    Hemoglobin A1C 5.60 4.80 - 5.60 %   Lipoprotein NMR   Result Value Ref Range    LDL-P 876 <1,000 nmol/L    LDL-C 86 0 - 99 mg/dL    HDL-C 84 >39 mg/dL    Triglycerides 78 0 - 149 mg/dL    Total Cholesterol 186 100 - 199 mg/dL    HDL-P (Total) 41.3 >=30.5 umol/L    Small LDL-P 130 <=527 nmol/L    LDL Size 21.4 >20.5 nm     LP-IR Score** <25 <=45   Cardiovascular Risk Assessment   Result Value Ref Range    Interpretation Note    Diabetes Patient Education   Result Value Ref Range    PDF Image Not applicable          Assessment/Plan   Diagnoses and all orders for this visit:    Prediabetes  -     T3, Free; Future  -     T4 & TSH ([facility]); Future  -     T4, Free; Future  -     Comprehensive Thyroglobulin; Future  -     Comprehensive Metabolic Panel; Future  -     Hemoglobin A1c; Future  -     Lipid Panel; Future  -     Uric Acid; Future  -     Vitamin D 25 Hydroxy; Future    Chronic fatigue  -     T3, Free; Future  -     T4 & TSH ([facility]); Future  -     T4, Free; Future  -     Comprehensive Thyroglobulin; Future  -     Comprehensive Metabolic Panel; Future  -     Hemoglobin A1c; Future  -     Lipid Panel; Future  -     Uric Acid; Future  -     Vitamin D 25 Hydroxy; Future    Essential hypertension  -     T3, Free; Future  -     T4 & TSH ([facility]); Future  -     T4, Free; Future  -     Comprehensive Thyroglobulin; Future  -     Comprehensive Metabolic Panel; Future  -     Hemoglobin A1c; Future  -     Lipid Panel; Future  -     Uric Acid; Future  -     Vitamin D 25 Hydroxy; Future    Hypercholesterolemia  -     T3, Free; Future  -     T4 & TSH ([facility]); Future  -     T4, Free; Future  -     Comprehensive Thyroglobulin; Future  -     Comprehensive Metabolic Panel; Future  -     Hemoglobin A1c; Future  -     Lipid Panel; Future  -     Uric Acid; Future  -     Vitamin D 25 Hydroxy; Future    Vitamin D deficiency  -     T3, Free; Future  -     T4 & TSH ([facility]); Future  -     T4, Free; Future  -     Comprehensive Thyroglobulin; Future  -     Comprehensive Metabolic Panel; Future  -     Hemoglobin A1c; Future  -     Lipid Panel; Future  -     Uric Acid; Future  -     Vitamin D 25 Hydroxy; Future    Multiple thyroid nodules  -     T3, Free; Future  -     T4 & TSH ([facility]); Future  -     T4, Free; Future  -     Comprehensive  Thyroglobulin; Future  -     Comprehensive Metabolic Panel; Future  -     Hemoglobin A1c; Future  -     Lipid Panel; Future  -     Uric Acid; Future  -     Vitamin D 25 Hydroxy; Future    Osteopenia, unspecified location  -     T3, Free; Future  -     T4 & TSH ([facility]); Future  -     T4, Free; Future  -     Comprehensive Thyroglobulin; Future  -     Comprehensive Metabolic Panel; Future  -     Hemoglobin A1c; Future  -     Lipid Panel; Future  -     Uric Acid; Future  -     Vitamin D 25 Hydroxy; Future  -     dexa bone density appendicular; Future    Abnormal findings on diagnostic imaging of other parts of musculoskeletal system   -     dexa bone density appendicular; Future    Other orders  -     allopurinol (ZYLOPRIM) 300 MG tablet; Take 1 tablet by mouth Daily.  -     ergocalciferol (DRISDOL) 16505 units capsule; TAKE 1 CAPSULE PO ONCE A WEEK               In his summary I saw and examined this the 72-year-old female for above-mentioned problems.  I reviewed her laboratory evaluation of 08/29/2018 and provided her with a hard copy of it.  Overall she is clinically and metabolically stable and therefore we will go ahead and continue all her current prescriptions.  She will see Ms. Moriah Enriquez in 6 months or sooner if needed with laboratory evaluation prior to each office visit.

## 2018-09-26 DIAGNOSIS — Z78.0 POST-MENOPAUSAL: Primary | ICD-10-CM

## 2019-02-16 ENCOUNTER — HOSPITAL ENCOUNTER (EMERGENCY)
Facility: HOSPITAL | Age: 74
Discharge: HOME | End: 2019-02-16
Attending: EMERGENCY MEDICINE | Admitting: EMERGENCY MEDICINE

## 2019-02-16 VITALS
HEART RATE: 97 BPM | WEIGHT: 132 LBS | RESPIRATION RATE: 18 BRPM | DIASTOLIC BLOOD PRESSURE: 74 MMHG | HEIGHT: 59 IN | BODY MASS INDEX: 26.61 KG/M2 | OXYGEN SATURATION: 98 % | TEMPERATURE: 98 F | SYSTOLIC BLOOD PRESSURE: 164 MMHG

## 2019-02-16 DIAGNOSIS — T36.3X5A: ICD-10-CM

## 2019-02-16 DIAGNOSIS — I10 ESSENTIAL HYPERTENSION: ICD-10-CM

## 2019-02-16 DIAGNOSIS — H81.11 BENIGN PAROXYSMAL POSITIONAL VERTIGO OF RIGHT EAR: Primary | ICD-10-CM

## 2019-02-16 DIAGNOSIS — K59.1 FUNCTIONAL DIARRHEA: ICD-10-CM

## 2019-02-16 LAB
ALBUMIN SERPL-MCNC: 3.9 G/DL (ref 3.5–5.2)
ALBUMIN/GLOB SERPL: 1.4 G/DL
ALP SERPL-CCNC: 73 U/L (ref 39–117)
ALT SERPL W P-5'-P-CCNC: 14 U/L (ref 1–33)
ANION GAP SERPL CALCULATED.3IONS-SCNC: 12.9 MMOL/L
AST SERPL-CCNC: 15 U/L (ref 1–32)
BASOPHILS # BLD AUTO: 0.03 10*3/MM3 (ref 0–0.2)
BASOPHILS NFR BLD AUTO: 0.4 % (ref 0–1.5)
BILIRUB SERPL-MCNC: 0.4 MG/DL (ref 0.1–1.2)
BUN BLD-MCNC: 17 MG/DL (ref 8–23)
BUN/CREAT SERPL: 16.3 (ref 7–25)
CALCIUM SPEC-SCNC: 9.7 MG/DL (ref 8.6–10.5)
CHLORIDE SERPL-SCNC: 106 MMOL/L (ref 98–107)
CO2 SERPL-SCNC: 24.1 MMOL/L (ref 22–29)
CREAT BLD-MCNC: 1.04 MG/DL (ref 0.57–1)
DEPRECATED RDW RBC AUTO: 47.8 FL (ref 37–54)
EOSINOPHIL # BLD AUTO: 0.04 10*3/MM3 (ref 0–0.4)
EOSINOPHIL NFR BLD AUTO: 0.5 % (ref 0.3–6.2)
ERYTHROCYTE [DISTWIDTH] IN BLOOD BY AUTOMATED COUNT: 15.1 % (ref 12.3–15.4)
GFR SERPL CREATININE-BSD FRML MDRD: 52 ML/MIN/1.73
GLOBULIN UR ELPH-MCNC: 2.8 GM/DL
GLUCOSE BLD-MCNC: 113 MG/DL (ref 65–99)
HCT VFR BLD AUTO: 38.7 % (ref 34–46.6)
HGB BLD-MCNC: 12.5 G/DL (ref 12–15.9)
IMM GRANULOCYTES # BLD AUTO: 0.02 10*3/MM3 (ref 0–0.05)
IMM GRANULOCYTES NFR BLD AUTO: 0.3 % (ref 0–0.5)
LYMPHOCYTES # BLD AUTO: 1.43 10*3/MM3 (ref 0.7–3.1)
LYMPHOCYTES NFR BLD AUTO: 19.2 % (ref 19.6–45.3)
MCH RBC QN AUTO: 28 PG (ref 26.6–33)
MCHC RBC AUTO-ENTMCNC: 32.3 G/DL (ref 31.5–35.7)
MCV RBC AUTO: 86.6 FL (ref 79–97)
MONOCYTES # BLD AUTO: 0.33 10*3/MM3 (ref 0.1–0.9)
MONOCYTES NFR BLD AUTO: 4.4 % (ref 5–12)
NEUTROPHILS # BLD AUTO: 5.61 10*3/MM3 (ref 1.4–7)
NEUTROPHILS NFR BLD AUTO: 75.2 % (ref 42.7–76)
NRBC BLD AUTO-RTO: 0 /100 WBC (ref 0–0)
PLATELET # BLD AUTO: 209 10*3/MM3 (ref 140–450)
PMV BLD AUTO: 10.2 FL (ref 6–12)
POTASSIUM BLD-SCNC: 3.8 MMOL/L (ref 3.5–5.2)
PROT SERPL-MCNC: 6.7 G/DL (ref 6–8.5)
RBC # BLD AUTO: 4.47 10*6/MM3 (ref 3.77–5.28)
SODIUM BLD-SCNC: 143 MMOL/L (ref 136–145)
THEOPHYLLINE SERPL-MCNC: 10.3 MCG/ML (ref 10–20)
TROPONIN T SERPL-MCNC: <0.01 NG/ML (ref 0–0.03)
WBC NRBC COR # BLD: 7.46 10*3/MM3 (ref 3.4–10.8)

## 2019-02-16 PROCEDURE — 80053 COMPREHEN METABOLIC PANEL: CPT | Performed by: EMERGENCY MEDICINE

## 2019-02-16 PROCEDURE — 93010 ELECTROCARDIOGRAM REPORT: CPT | Performed by: INTERNAL MEDICINE

## 2019-02-16 PROCEDURE — 85025 COMPLETE CBC W/AUTO DIFF WBC: CPT | Performed by: EMERGENCY MEDICINE

## 2019-02-16 PROCEDURE — 80198 ASSAY OF THEOPHYLLINE: CPT | Performed by: EMERGENCY MEDICINE

## 2019-02-16 PROCEDURE — 84484 ASSAY OF TROPONIN QUANT: CPT | Performed by: EMERGENCY MEDICINE

## 2019-02-16 PROCEDURE — 96361 HYDRATE IV INFUSION ADD-ON: CPT

## 2019-02-16 PROCEDURE — 96374 THER/PROPH/DIAG INJ IV PUSH: CPT

## 2019-02-16 PROCEDURE — 93005 ELECTROCARDIOGRAM TRACING: CPT | Performed by: EMERGENCY MEDICINE

## 2019-02-16 PROCEDURE — 99284 EMERGENCY DEPT VISIT MOD MDM: CPT

## 2019-02-16 PROCEDURE — 25010000002 ONDANSETRON PER 1 MG: Performed by: EMERGENCY MEDICINE

## 2019-02-16 RX ORDER — ONDANSETRON 4 MG/1
4 TABLET, ORALLY DISINTEGRATING ORAL EVERY 6 HOURS PRN
Qty: 10 TABLET | Refills: 0 | Status: SHIPPED | OUTPATIENT
Start: 2019-02-16 | End: 2019-09-11

## 2019-02-16 RX ORDER — SODIUM CHLORIDE 0.9 % (FLUSH) 0.9 %
10 SYRINGE (ML) INJECTION AS NEEDED
Status: DISCONTINUED | OUTPATIENT
Start: 2019-02-16 | End: 2019-02-16 | Stop reason: HOSPADM

## 2019-02-16 RX ORDER — MECLIZINE HYDROCHLORIDE 25 MG/1
25 TABLET ORAL 3 TIMES DAILY PRN
Qty: 20 TABLET | Refills: 0 | Status: SHIPPED | OUTPATIENT
Start: 2019-02-16 | End: 2020-11-14 | Stop reason: HOSPADM

## 2019-02-16 RX ORDER — ONDANSETRON 2 MG/ML
4 INJECTION INTRAMUSCULAR; INTRAVENOUS ONCE
Status: COMPLETED | OUTPATIENT
Start: 2019-02-16 | End: 2019-02-16

## 2019-02-16 RX ORDER — MECLIZINE HYDROCHLORIDE 25 MG/1
25 TABLET ORAL ONCE
Status: COMPLETED | OUTPATIENT
Start: 2019-02-16 | End: 2019-02-16

## 2019-02-16 RX ADMIN — ONDANSETRON HYDROCHLORIDE 4 MG: 2 SOLUTION INTRAMUSCULAR; INTRAVENOUS at 13:31

## 2019-02-16 RX ADMIN — MECLIZINE HYDROCHLORIDE 25 MG: 25 TABLET ORAL at 13:32

## 2019-02-16 RX ADMIN — SODIUM CHLORIDE 1000 ML: 9 INJECTION, SOLUTION INTRAVENOUS at 13:31

## 2019-02-16 NOTE — DISCHARGE INSTRUCTIONS
Use zofran and antivert if dizziness returns.  Do not restart the zithromax.  Drink plenty of fluids and rest today.  Please return to the ED if symptoms worsen with increasing dizziness, diarrhea or fever.

## 2019-02-16 NOTE — ED NOTES
"Patient in room 40, family at bedside.  Patient reports having diarrhea since Wednesday, was placed on z-pack, took one tablet on Wednesday and has not taken any since. Patient has had x3 episodes of loose watery diarrhea today. Denies any abdominal pain/cramping, n/v. Patient also c/o HTN, missed her BP medication on Monday and Tuesday and her PCP told her to take a whole tablet for the next two days, took her BP medication this morning. Also c/o having dizziness when standing, states, \"im fine when Im laying in bed, but when I stand I get dizzy.\"     Patient denies CP, SOA, or any other s/s at this time. Patient in NAD at this time, VSS, call light within reach, patient alert.      Prachi Muller, RN  02/16/19 1789    "

## 2019-02-16 NOTE — ED NOTES
Patient reports having dizzy spells, /95, diarrhea since Wednesday night. Started taking antibiotic Wednesday night Z-pack for URI.      Prachi Muller, KACY  02/16/19 [id number]

## 2019-02-16 NOTE — ED PROVIDER NOTES
EMERGENCY DEPARTMENT ENCOUNTER    CHIEF COMPLAINT  Chief Complaint: diarrhea  History given by: patient  History limited by: nothing  Room Number: 40/40  PMD: Babar Jones MD      HPI:  Pt is a 73 y.o. female who presents complaining of diarrhea that began 3 days ago. Patient reports that she has had three episodes of diarrhea today. Pt also reports diaphoresis and vertigo this morning which have since resolved. Patient denies abd pain, nausea, vomiting, headache, numbness, tingling, fever, chills, cough, and SOA. Pt reports that she was on her first round of Zithromax for a cough a few weeks ago and returned to her PCP three days ago at which time she was started on another course of Zithromax to treat an URI. Patient reports that she has only taken one dosage of Zithromax of her second course. Pt reports a hx of benign positional vertigo, asthma, and COPD. Patient has a hx of hypertension but reports that she missed her dosage of Toprol on 02/11/19 and 02/12/19 but took it earlier today. Patient is currently taking Theodur. Patient denies smoking or EtOH consumption. There are no other complaints at this time.    Duration:  3 days  Onset: gradual  Timing: constant  Location:   Radiation: n/a  Quality: diarrhea  Intensity/Severity: moderate  Associated Symptoms: diaphoresis and vertigo this morning which have since resolved  Progression: unchanged  Treatment before arrival: none specified    PAST MEDICAL HISTORY  Active Ambulatory Problems     Diagnosis Date Noted   • Chest pain 10/20/2016   • Colon polyp 02/15/2012   • COPD (chronic obstructive pulmonary disease) (CMS/Formerly [facility]) 02/15/2012   • Diverticulosis 02/15/2012   • Essential hypertension 03/24/2016   • GERD without esophagitis 03/24/2016   • Hypercholesterolemia 03/24/2016   • Osteopenia 10/20/2016   • Multiple thyroid nodules 10/20/2016   • Chronic fatigue 10/20/2016   • Prediabetes 10/29/2016   • Vitamin D deficiency 10/29/2016   • Renal  "insufficiency 10/29/2016   • Leg swelling 08/07/2018     Resolved Ambulatory Problems     Diagnosis Date Noted   • Migraine 02/15/2012     Past Medical History:   Diagnosis Date   • Arthritis    • Asthma    • Chest pain    • Depression    • Ganglion cyst    • GERD (gastroesophageal reflux disease)    • Gout    • Hiatal hernia    • Hypertension    • Hypokalemia    • Mild mitral regurgitation    • Thyroid nodule    • Tricuspid regurgitation    • Vitamin D deficiency        PAST SURGICAL HISTORY  Past Surgical History:   Procedure Laterality Date   • APPENDECTOMY     • BREAST LUMPECTOMY     • CHOLECYSTECTOMY     • GANGLION CYST EXCISION     • HERNIA REPAIR     • HYSTERECTOMY         FAMILY HISTORY  Family History   Problem Relation Age of Onset   • Heart attack Other    • Heart disease Other    • Hypertension Father    • Stroke Father        SOCIAL HISTORY  Social History     Socioeconomic History   • Marital status:      Spouse name: Not on file   • Number of children: Not on file   • Years of education: Not on file   • Highest education level: Not on file   Social Needs   • Financial resource strain: Not on file   • Food insecurity - worry: Not on file   • Food insecurity - inability: Not on file   • Transportation needs - medical: Not on file   • Transportation needs - non-medical: Not on file   Occupational History   • Not on file   Tobacco Use   • Smoking status: Former Smoker   • Smokeless tobacco: Never Used   • Tobacco comment: 13 years no smoking.    Substance and Sexual Activity   • Alcohol use: No     Comment: \"15 years sobriety\"   • Drug use: No   • Sexual activity: No     Birth control/protection: Surgical   Other Topics Concern   • Not on file   Social History Narrative   • Not on file       ALLERGIES  Iodinated diagnostic agents; Aspirin; Doxycycline; and Sertraline    REVIEW OF SYSTEMS  Review of Systems   Constitutional: Positive for diaphoresis (resolved). Negative for chills and fever.   HENT: " Negative for sore throat.    Eyes: Negative.    Respiratory: Negative for cough and shortness of breath.    Cardiovascular: Negative for chest pain.   Gastrointestinal: Positive for diarrhea. Negative for abdominal pain, nausea and vomiting.   Genitourinary: Negative for dysuria.   Musculoskeletal: Negative for neck pain.   Skin: Negative for rash.   Neurological: Positive for dizziness (resolved). Negative for weakness, numbness and headaches.   Hematological: Negative.    Psychiatric/Behavioral: Negative.    All other systems reviewed and are negative.      PHYSICAL EXAM  ED Triage Vitals [02/16/19 1248]   Temp Heart Rate Resp BP SpO2   99 °F (37.2 °C) 85 18 (!) 184/91 97 %      Temp src Heart Rate Source Patient Position BP Location FiO2 (%)   -- -- -- -- --       Physical Exam   Constitutional: She is oriented to person, place, and time. She appears distressed (mild).   HENT:   Head: Normocephalic and atraumatic.   Right Ear: Tympanic membrane normal.   Left Ear: Tympanic membrane normal.   Nose: No sinus tenderness.   Mouth/Throat: Oropharynx is clear and moist.   Eyes: Pupils are equal, round, and reactive to light. Right eye exhibits nystagmus (when looking to the right with a fast component to the right).   Neck: Normal range of motion. Neck supple.   Cardiovascular: Normal rate, regular rhythm and normal heart sounds.   No murmur heard.  Pulmonary/Chest: Effort normal and breath sounds normal. No respiratory distress. She has no decreased breath sounds. She has no wheezes. She has no rhonchi. She has no rales. She exhibits no tenderness.   Abdominal: Soft. There is no tenderness. There is no rebound and no guarding.   Musculoskeletal: Normal range of motion. She exhibits no edema (legs) or tenderness (calf).   No edema or calf tenderness legs bilaterally   Lymphadenopathy:     She has no cervical adenopathy.   Neurological: She is alert and oriented to person, place, and time. She has normal sensation and  normal strength. She displays facial symmetry. No sensory deficit.   5/5 motor strength of all extremities   Skin: Skin is warm and dry. No rash noted.   Psychiatric: Mood and affect normal.   Nursing note and vitals reviewed.      LAB RESULTS  Lab Results (last 24 hours)     Procedure Component Value Units Date/Time    CBC & Differential [[id number]] Collected:  02/16/19 1327    Specimen:  Blood Updated:  02/16/19 1340    Narrative:       The following orders were created for panel order CBC & Differential.  Procedure                               Abnormality         Status                     ---------                               -----------         ------                     CBC Auto Differential[[id number]]        Abnormal            Final result                 Please view results for these tests on the individual orders.    Comprehensive Metabolic Panel [[id number]]  (Abnormal) Collected:  02/16/19 1327    Specimen:  Blood Updated:  02/16/19 1359     Glucose 113 mg/dL      BUN 17 mg/dL      Creatinine 1.04 mg/dL      Sodium 143 mmol/L      Potassium 3.8 mmol/L      Chloride 106 mmol/L      CO2 24.1 mmol/L      Calcium 9.7 mg/dL      Total Protein 6.7 g/dL      Albumin 3.90 g/dL      ALT (SGPT) 14 U/L      AST (SGOT) 15 U/L      Alkaline Phosphatase 73 U/L      Total Bilirubin 0.4 mg/dL      eGFR Non African Amer 52 mL/min/1.73      Globulin 2.8 gm/dL      A/G Ratio 1.4 g/dL      BUN/Creatinine Ratio 16.3     Anion Gap 12.9 mmol/L     Narrative:       The MDRD GFR formula is only valid for adults with stable renal function between ages 18 and 70.    Theophylline Level [[id number]]  (Normal) Collected:  02/16/19 1327    Specimen:  Blood Updated:  02/16/19 1400     Theophylline Level 10.3 mcg/mL     Troponin [[id number]]  (Normal) Collected:  02/16/19 1327    Specimen:  Blood Updated:  02/16/19 1359     Troponin T <0.010 ng/mL     Narrative:       Troponin T Reference Range:  <= 0.03 ng/mL-   Negative for AMI  >0.03  ng/mL-     Abnormal for myocardial necrosis.  Clinicians would have to utilize clinical acumen, EKG, Troponin and serial changes to determine if it is an Acute Myocardial Infarction or myocardial injury due to an underlying chronic condition.     CBC Auto Differential [[id number]]  (Abnormal) Collected:  02/16/19 1327    Specimen:  Blood Updated:  02/16/19 1340     WBC 7.46 10*3/mm3      RBC 4.47 10*6/mm3      Hemoglobin 12.5 g/dL      Hematocrit 38.7 %      MCV 86.6 fL      MCH 28.0 pg      MCHC 32.3 g/dL      RDW 15.1 %      RDW-SD 47.8 fl      MPV 10.2 fL      Platelets 209 10*3/mm3      Neutrophil % 75.2 %      Lymphocyte % 19.2 %      Monocyte % 4.4 %      Eosinophil % 0.5 %      Basophil % 0.4 %      Immature Grans % 0.3 %      Neutrophils, Absolute 5.61 10*3/mm3      Lymphocytes, Absolute 1.43 10*3/mm3      Monocytes, Absolute 0.33 10*3/mm3      Eosinophils, Absolute 0.04 10*3/mm3      Basophils, Absolute 0.03 10*3/mm3      Immature Grans, Absolute 0.02 10*3/mm3      nRBC 0.0 /100 WBC           I ordered the above labs and reviewed the results.      PROCEDURES  Procedures  EKG          EKG time: 1314  Rhythm/Rate: NSR 84  P waves and NE: normal   QRS, axis: normal  ST and T waves: nonspecific T waves anteriorly    Interpreted Contemporaneously by me, independently viewed  Improved when compared to prior from 7/01/18      PROGRESS AND CONSULTS  1253 Ordered labs for further evaluation. Ordered an EKG for further evaluation.    1318 Ordered Antivert and Zofran to treat patient. Also ordered IV Fluids for hydration.    1417 Rechecked with patient who is resting comfortably and in NAD. Informed pt of the results of her unremarkable labs. Discussed the plan to complete a road test prior to being discharged with a prescription for Antivert and Zofran and instructions to f/u with her PCP for further evaluation. Pt understands and agrees with the plan, all questions answered.    1421 Per RNPrachi, patient was able  to ambulate without difficulty in the ED. She did not complain of dizziness. She will be discharged home with a prescription for Antivert and Zofran and instructions to f/u with her PCP for further evaluation.      MEDICAL DECISION MAKING  Results were reviewed/discussed with the patient and they were also made aware of online access. Pt also made aware that some labs, such as cultures, will not be resulted during ER visit and follow up with PMD is necessary.     MDM  Number of Diagnoses or Management Options  Adverse effect of macrolide antibiotic, initial encounter:   Benign paroxysmal positional vertigo of right ear:   Essential hypertension:   Functional diarrhea:      Amount and/or Complexity of Data Reviewed  Clinical lab tests: reviewed and ordered (Glucose: 113 and Theophylline Level: 10.3)  Tests in the medicine section of CPT®: reviewed and ordered (See EKG note)    Patient Progress  Patient progress: stable         DIAGNOSIS  Final diagnoses:   Benign paroxysmal positional vertigo of right ear   Adverse effect of macrolide antibiotic, initial encounter   Functional diarrhea   Essential hypertension       DISPOSITION  DISCHARGE    Patient discharged in stable condition.    Reviewed implications of results, diagnosis, meds, responsibility to follow up, warning signs and symptoms of possible worsening, potential complications and reasons to return to ER, including any new or worsening symptoms.    Patient/Family voiced understanding of above instructions.    Discussed plan for discharge, as there is no emergent indication for admission. Patient referred to primary care provider for BP management due to today's BP. Pt/family is agreeable and understands need for follow up and repeat testing.  Pt is aware that discharge does not mean that nothing is wrong but it indicates no emergency is present that requires admission and they must continue care with follow-up as given below or physician of their choice.      FOLLOW-UP  Babar Jones MD  [address] [facility] [address]  [phone]    Schedule an appointment as soon as possible for a visit            Medication List      New Prescriptions    meclizine 25 MG tablet  Commonly known as:  ANTIVERT  Take 1 tablet by mouth 3 (Three) Times a Day As Needed for dizziness.     ondansetron ODT 4 MG disintegrating tablet  Commonly known as:  ZOFRAN-ODT  Take 1 tablet by mouth Every 6 (Six) Hours As Needed for Nausea.              Latest Documented Vital Signs:  As of 4:45 PM  BP- 164/74 HR- 97 Temp- 98 °F (36.7 °C) (Tympanic) O2 sat- 98%    --  Documentation assistance provided by ana Su and Nichol Weaver for Dr. Beth.  Information recorded by the scribe was done at my direction and has been verified and validated by me.     Олге Su  02/16/19 1623       Nichol Weaver  02/16/19 0285       Miko Beth MD  02/16/19 8302

## 2019-02-28 ENCOUNTER — RESULTS ENCOUNTER (OUTPATIENT)
Dept: ENDOCRINOLOGY | Age: 74
End: 2019-02-28

## 2019-02-28 DIAGNOSIS — I10 ESSENTIAL HYPERTENSION: ICD-10-CM

## 2019-02-28 DIAGNOSIS — E04.2 MULTIPLE THYROID NODULES: ICD-10-CM

## 2019-02-28 DIAGNOSIS — E78.00 HYPERCHOLESTEROLEMIA: ICD-10-CM

## 2019-02-28 DIAGNOSIS — M85.80 OSTEOPENIA, UNSPECIFIED LOCATION: ICD-10-CM

## 2019-02-28 DIAGNOSIS — E55.9 VITAMIN D DEFICIENCY: ICD-10-CM

## 2019-02-28 DIAGNOSIS — R73.03 PREDIABETES: ICD-10-CM

## 2019-02-28 DIAGNOSIS — R53.82 CHRONIC FATIGUE: ICD-10-CM

## 2019-03-06 DIAGNOSIS — E78.00 HYPERCHOLESTEROLEMIA: Primary | ICD-10-CM

## 2019-03-06 DIAGNOSIS — E04.2 MULTIPLE THYROID NODULES: ICD-10-CM

## 2019-03-06 DIAGNOSIS — R73.03 PREDIABETES: ICD-10-CM

## 2019-03-06 DIAGNOSIS — E55.9 VITAMIN D DEFICIENCY: ICD-10-CM

## 2019-03-07 ENCOUNTER — LAB (OUTPATIENT)
Dept: ENDOCRINOLOGY | Age: 74
End: 2019-03-07

## 2019-03-07 DIAGNOSIS — E78.00 HYPERCHOLESTEROLEMIA: ICD-10-CM

## 2019-03-07 DIAGNOSIS — E55.9 VITAMIN D DEFICIENCY: ICD-10-CM

## 2019-03-07 DIAGNOSIS — E04.2 MULTIPLE THYROID NODULES: ICD-10-CM

## 2019-03-07 DIAGNOSIS — R73.03 PREDIABETES: ICD-10-CM

## 2019-03-09 LAB
25(OH)D3+25(OH)D2 SERPL-MCNC: 53.4 NG/ML (ref 30–100)
ALBUMIN SERPL-MCNC: 4.4 G/DL (ref 3.5–5.2)
ALBUMIN/GLOB SERPL: 2 G/DL
ALP SERPL-CCNC: 82 U/L (ref 39–117)
ALT SERPL-CCNC: 14 U/L (ref 1–33)
AST SERPL-CCNC: 14 U/L (ref 1–32)
BILIRUB SERPL-MCNC: 0.3 MG/DL (ref 0.1–1.2)
BUN SERPL-MCNC: 22 MG/DL (ref 8–23)
BUN/CREAT SERPL: 21.4 (ref 7–25)
C PEPTIDE SERPL-MCNC: 6.2 NG/ML (ref 1.1–4.4)
CALCIUM SERPL-MCNC: 10.2 MG/DL (ref 8.6–10.5)
CHLORIDE SERPL-SCNC: 104 MMOL/L (ref 98–107)
CHOLEST SERPL-MCNC: 194 MG/DL (ref 0–200)
CO2 SERPL-SCNC: 25.4 MMOL/L (ref 22–29)
CREAT SERPL-MCNC: 1.03 MG/DL (ref 0.57–1)
FT4I SERPL CALC-MCNC: 1.9 (ref 1.2–4.9)
GLOBULIN SER CALC-MCNC: 2.2 GM/DL
GLUCOSE SERPL-MCNC: 91 MG/DL (ref 65–99)
HBA1C MFR BLD: 5.56 % (ref 4.8–5.6)
HDLC SERPL-MCNC: 83 MG/DL (ref 40–60)
INTERPRETATION: NORMAL
LDLC SERPL CALC-MCNC: 77 MG/DL (ref 0–100)
Lab: NORMAL
POTASSIUM SERPL-SCNC: 4 MMOL/L (ref 3.5–5.2)
PROT SERPL-MCNC: 6.6 G/DL (ref 6–8.5)
SODIUM SERPL-SCNC: 140 MMOL/L (ref 136–145)
T3FREE SERPL-MCNC: 2.8 PG/ML (ref 2–4.4)
T3RU NFR SERPL: 25 % (ref 24–39)
T4 FREE SERPL-MCNC: 1.3 NG/DL (ref 0.93–1.7)
T4 SERPL-MCNC: 7.7 UG/DL (ref 4.5–12)
THYROGLOB AB SERPL-ACNC: <1 IU/ML
THYROGLOB SERPL-MCNC: 5.6 NG/ML
THYROGLOB SERPL-MCNC: NORMAL NG/ML
TRIGL SERPL-MCNC: 168 MG/DL (ref 0–150)
TSH SERPL DL<=0.005 MIU/L-ACNC: 1.73 UIU/ML (ref 0.45–4.5)
VLDLC SERPL CALC-MCNC: 33.6 MG/DL (ref 5–40)

## 2019-03-12 RX ORDER — ERGOCALCIFEROL 1.25 MG/1
CAPSULE ORAL
Qty: 13 CAPSULE | Refills: 3 | Status: CANCELLED | OUTPATIENT
Start: 2019-03-12

## 2019-03-20 ENCOUNTER — OFFICE VISIT (OUTPATIENT)
Dept: ENDOCRINOLOGY | Age: 74
End: 2019-03-20

## 2019-03-20 VITALS
BODY MASS INDEX: 27.62 KG/M2 | SYSTOLIC BLOOD PRESSURE: 126 MMHG | HEIGHT: 59 IN | DIASTOLIC BLOOD PRESSURE: 74 MMHG | WEIGHT: 137 LBS

## 2019-03-20 DIAGNOSIS — E04.2 MULTIPLE THYROID NODULES: Primary | ICD-10-CM

## 2019-03-20 DIAGNOSIS — R73.03 PREDIABETES: ICD-10-CM

## 2019-03-20 DIAGNOSIS — I10 ESSENTIAL HYPERTENSION: ICD-10-CM

## 2019-03-20 DIAGNOSIS — N28.9 RENAL INSUFFICIENCY: ICD-10-CM

## 2019-03-20 DIAGNOSIS — E78.00 HYPERCHOLESTEROLEMIA: ICD-10-CM

## 2019-03-20 DIAGNOSIS — E55.9 VITAMIN D DEFICIENCY: ICD-10-CM

## 2019-03-20 PROBLEM — E79.0 HYPERURICEMIA: Status: ACTIVE | Noted: 2019-03-20

## 2019-03-20 PROCEDURE — 99214 OFFICE O/P EST MOD 30 MIN: CPT | Performed by: NURSE PRACTITIONER

## 2019-03-20 NOTE — PROGRESS NOTES
"Subjective   Mary Boyle is a 73 y.o. female is here today for follow-up.  Chief Complaint   Patient presents with   • Prediabetes     recent labs   • Hyperlipidemia   • Hypertension   • thyroid nodule     /74   Ht 149.9 cm (59\")   Wt 62.1 kg (137 lb)   BMI 27.67 kg/m²   Current Outpatient Medications on File Prior to Visit   Medication Sig   • albuterol (VENTOLIN HFA) 108 (90 BASE) MCG/ACT inhaler As Needed.   • allopurinol (ZYLOPRIM) 300 MG tablet Take 1 tablet by mouth Daily.   • ergocalciferol (DRISDOL) 78172 units capsule TAKE 1 CAPSULE PO ONCE A WEEK   • esomeprazole (NEXIUM) 40 MG capsule Take 40 mg by mouth Daily.   • ipratropium-albuterol (DUO-NEB) 0.5-2.5 mg/mL nebulizer 2 sprays into each nostril daily   • loratadine (CLARITIN) 10 MG tablet Take 10 mg by mouth Every Night.   • meclizine (ANTIVERT) 25 MG tablet Take 1 tablet by mouth 3 (Three) Times a Day As Needed for dizziness.   • metoprolol succinate XL (TOPROL-XL) 25 MG 24 hr tablet Take 25 mg by mouth.   • nitroglycerin (NITROSTAT) 0.4 MG SL tablet Place 0.4 mg under the tongue Every 5 (Five) Minutes. prn   • ondansetron ODT (ZOFRAN-ODT) 4 MG disintegrating tablet Take 1 tablet by mouth Every 6 (Six) Hours As Needed for Nausea.   • potassium chloride (K-DUR,KLOR-CON) 10 MEQ CR tablet Take 10 mEq by mouth Daily.   • hydrochlorothiazide (HYDRODIURIL) 12.5 MG tablet Take 12.5 mg by mouth.   • [DISCONTINUED] theophylline (THEODUR) 300 MG 12 hr tablet Take 200 mg by mouth 2 (Two) Times a Day.     No current facility-administered medications on file prior to visit.      Family History   Problem Relation Age of Onset   • Heart attack Other    • Heart disease Other    • Hypertension Father    • Stroke Father      Social History     Tobacco Use   • Smoking status: Former Smoker   • Smokeless tobacco: Never Used   • Tobacco comment: 13 years no smoking.    Substance Use Topics   • Alcohol use: No     Comment: \"15 years sobriety\"   • Drug use: No "     Allergies   Allergen Reactions   • Iodinated Diagnostic Agents Shortness Of Breath     Asthma attack and hives   • Aspirin Other (See Comments)     WHEEZING  WHEEZING  WHEEZING   • Doxycycline Other (See Comments)   • Sertraline Diarrhea, Nausea And Vomiting and Other (See Comments)     CHEST PAIN   CHEST PAIN   CHEST PAIN          History of Present Illness  Encounter Diagnoses   Name Primary?   • Essential hypertension Yes   • Hypercholesterolemia    • Vitamin D deficiency    • Multiple thyroid nodules    • Renal insufficiency    • Prediabetes    73-year-old female patient here today for routine follow-up visit.  She has been seen for the above-mentioned problems.  She has a history of prediabetes however her last several hemoglobin A1c's have been in normal range.  She is currently not on any medication for prediabetes.  She states she was seen in the hospital recently for hypertension and her blood pressure medication was increased.  Her blood pressure at today's visit is in satisfactory range.  She has the capability to monitor her blood pressure at home.  She was recently prescribed an antidepressant however she states she is not taking it because it makes her too sleepy.  She does have complaints of chronic fatigue at today's visit.  She is not sure if she snores and is not interested in seeing a sleep medicine specialist.  Her last thyroid ultrasound was a year ago which was stable.  She works at [employer] part-time as a .     The following portions of the patient's history were reviewed and updated as appropriate: allergies, current medications, past family history, past medical history, past social history, past surgical history and problem list.    Review of Systems   Constitutional: Negative for fatigue.   HENT: Negative for trouble swallowing.    Eyes: Negative for visual disturbance.   Cardiovascular: Negative for leg swelling.   Endocrine: Negative for polyuria.   Skin: Negative for wound.    Neurological: Negative for numbness.       Objective   Physical Exam   Constitutional: She is oriented to person, place, and time. She appears well-developed and well-nourished. No distress.   HENT:   Head: Normocephalic and atraumatic.   Right Ear: External ear normal.   Left Ear: External ear normal.   Nose: Nose normal.   Mouth/Throat: Oropharynx is clear and moist. No oropharyngeal exudate.   Eyes: EOM are normal. Pupils are equal, round, and reactive to light. Right eye exhibits no discharge. Left eye exhibits no discharge.   Neck: Trachea normal, normal range of motion and full passive range of motion without pain. Neck supple. No tracheal tenderness present. Carotid bruit is not present. No tracheal deviation, no edema and no erythema present. No thyroid mass and no thyromegaly present.   Cardiovascular: Normal rate, regular rhythm, normal heart sounds and intact distal pulses. Exam reveals no gallop and no friction rub.   No murmur heard.  Pulmonary/Chest: Effort normal and breath sounds normal. No stridor. No respiratory distress. She has no wheezes. She has no rales.   Abdominal: Soft. Bowel sounds are normal. She exhibits no distension.   Musculoskeletal: She exhibits no edema or deformity.   Using walking cane  Hx of gout   Lymphadenopathy:     She has no cervical adenopathy.   Neurological: She is alert and oriented to person, place, and time.   Skin: Skin is warm and dry. No rash noted. She is not diaphoretic. No erythema. No pallor.   Psychiatric: She has a normal mood and affect. Her behavior is normal. Judgment and thought content normal.   Nursing note and vitals reviewed.    Results for orders placed or performed in visit on 03/07/19   Comprehensive Thyroglobulin   Result Value Ref Range    Thyroglobulin Ab <1.0 IU/mL    Thyroglobulin 5.6 ng/mL    Thyroglobulin (TG-SUNIL) Comment ng/mL   Thyroid Panel With TSH   Result Value Ref Range    TSH 1.730 0.450 - 4.500 uIU/mL    T4, Total 7.7 4.5 - 12.0  ug/dL    T3 Uptake 25 24 - 39 %    Free Thyroxine Index 1.9 1.2 - 4.9   T4, Free   Result Value Ref Range    Free T4 1.30 0.93 - 1.70 ng/dL   T3, Free   Result Value Ref Range    T3, Free 2.8 2.0 - 4.4 pg/mL   C-Peptide   Result Value Ref Range    C-Peptide 6.2 (H) 1.1 - 4.4 ng/mL   Hemoglobin A1c   Result Value Ref Range    Hemoglobin A1C 5.56 4.80 - 5.60 %   Vitamin D 25 Hydroxy   Result Value Ref Range    25 Hydroxy, Vitamin D 53.4 30.0 - 100.0 ng/ml   Comprehensive Metabolic Panel   Result Value Ref Range    Glucose 91 65 - 99 mg/dL    BUN 22 8 - 23 mg/dL    Creatinine 1.03 (H) 0.57 - 1.00 mg/dL    eGFR Non African Am 52 (L) >60 mL/min/1.73    eGFR African Am 63 >60 mL/min/1.73    BUN/Creatinine Ratio 21.4 7.0 - 25.0    Sodium 140 136 - 145 mmol/L    Potassium 4.0 3.5 - 5.2 mmol/L    Chloride 104 98 - 107 mmol/L    Total CO2 25.4 22.0 - 29.0 mmol/L    Calcium 10.2 8.6 - 10.5 mg/dL    Total Protein 6.6 6.0 - 8.5 g/dL    Albumin 4.40 3.50 - 5.20 g/dL    Globulin 2.2 gm/dL    A/G Ratio 2.0 g/dL    Total Bilirubin 0.3 0.1 - 1.2 mg/dL    Alkaline Phosphatase 82 39 - 117 U/L    AST (SGOT) 14 1 - 32 U/L    ALT (SGPT) 14 1 - 33 U/L   Lipid Panel   Result Value Ref Range    Total Cholesterol 194 0 - 200 mg/dL    Triglycerides 168 (H) 0 - 150 mg/dL    HDL Cholesterol 83 (H) 40 - 60 mg/dL    VLDL Cholesterol 33.6 5 - 40 mg/dL    LDL Cholesterol  77 0 - 100 mg/dL   Cardiovascular Risk Assessment   Result Value Ref Range    Interpretation Note    Diabetes Patient Education   Result Value Ref Range    PDF Image Not applicable          Assessment/Plan   Problems Addressed this Visit        Cardiovascular and Mediastinum    Essential hypertension - Primary    Hypercholesterolemia       Digestive    Vitamin D deficiency       Endocrine    Multiple thyroid nodules       Genitourinary    Renal insufficiency       Other    Prediabetes        In summary, patient was seen and examined.  Her hemoglobin A1c reflects normal glycemic  control.  Her last thyroid ultrasound a year ago was stable.  She has a history of gout and is on allopurinol.  Her recent uric acid was not checked.  Her blood pressure has improved since she was seen in the emergency room.  She is not taking an antidepressant that was recently prescribed.  Her cholesterol is in satisfactory range.  Vitamin D is stable.  She will follow-up in 6 months with Dr. Echols with labs prior.  Of encouraged her to reach out to the office should she have any questions or concerns prior to her next visit

## 2019-03-21 RX ORDER — ERGOCALCIFEROL 1.25 MG/1
CAPSULE ORAL
Qty: 26 CAPSULE | Refills: 1 | Status: SHIPPED | OUTPATIENT
Start: 2019-03-21 | End: 2019-03-21 | Stop reason: SDUPTHER

## 2019-03-22 RX ORDER — ERGOCALCIFEROL 1.25 MG/1
CAPSULE ORAL
Qty: 26 CAPSULE | Refills: 1 | Status: SHIPPED | OUTPATIENT
Start: 2019-03-22 | End: 2019-03-25 | Stop reason: SDUPTHER

## 2019-03-25 RX ORDER — ERGOCALCIFEROL 1.25 MG/1
CAPSULE ORAL
Qty: 26 CAPSULE | Refills: 0 | Status: SHIPPED | OUTPATIENT
Start: 2019-03-25 | End: 2019-10-30 | Stop reason: SDUPTHER

## 2019-08-12 ENCOUNTER — OFFICE VISIT (OUTPATIENT)
Dept: CARDIOLOGY | Facility: CLINIC | Age: 74
End: 2019-08-12

## 2019-08-12 VITALS
HEIGHT: 59 IN | SYSTOLIC BLOOD PRESSURE: 154 MMHG | DIASTOLIC BLOOD PRESSURE: 84 MMHG | BODY MASS INDEX: 27.42 KG/M2 | HEART RATE: 74 BPM | WEIGHT: 136 LBS

## 2019-08-12 DIAGNOSIS — I10 ESSENTIAL HYPERTENSION: ICD-10-CM

## 2019-08-12 DIAGNOSIS — R06.02 SOB (SHORTNESS OF BREATH): ICD-10-CM

## 2019-08-12 DIAGNOSIS — R94.31 ABNORMAL EKG: Primary | ICD-10-CM

## 2019-08-12 DIAGNOSIS — R07.2 PRECORDIAL PAIN: ICD-10-CM

## 2019-08-12 DIAGNOSIS — E78.00 HYPERCHOLESTEROLEMIA: ICD-10-CM

## 2019-08-12 PROCEDURE — 99213 OFFICE O/P EST LOW 20 MIN: CPT | Performed by: INTERNAL MEDICINE

## 2019-08-12 PROCEDURE — 93000 ELECTROCARDIOGRAM COMPLETE: CPT | Performed by: INTERNAL MEDICINE

## 2019-08-12 NOTE — PROGRESS NOTES
Subjective:        Mary Boyle is a 73 y.o. female who here for follow up    CC  TIRED AND SOB    OFF AND ON CP  HPI  73-year-old female with known history of the benign essential arterial hypertension, hypercholesterolemia atypical chest pain here for the follow-up has been complaining of the feeling tired as well as her shortness of breath on minimum exertion    Patient also complaining of off-and-on chest pain     Problem List Items Addressed This Visit        Cardiovascular and Mediastinum    Essential hypertension    Hypercholesterolemia       Nervous and Auditory    Chest pain      Other Visit Diagnoses     Abnormal EKG    -  Primary    Relevant Orders    Stress Test With Myocardial Perfusion One Day    Adult Transthoracic Echo Complete W/ Cont if Necessary Per Protocol    SOB (shortness of breath)        Relevant Orders    Stress Test With Myocardial Perfusion One Day    Adult Transthoracic Echo Complete W/ Cont if Necessary Per Protocol        .    The following portions of the patient's history were reviewed and updated as appropriate: allergies, current medications, past family history, past medical history, past social history, past surgical history and problem list.    Past Medical History:   Diagnosis Date   • Arthritis    • Asthma    • Chest pain    • Depression    • Ganglion cyst     right leg   • GERD (gastroesophageal reflux disease)    • Gout    • Hiatal hernia    • Hypertension    • Hypokalemia    • Mild mitral regurgitation    • Thyroid nodule    • Tricuspid regurgitation    • Vitamin D deficiency      reports that she has quit smoking. She has never used smokeless tobacco. She reports that she does not drink alcohol or use drugs.   Family History   Problem Relation Age of Onset   • Heart attack Other    • Heart disease Other    • Hypertension Father    • Stroke Father        Review of Systems  Constitutional: No wt loss, fever, fatigue  Gastrointestinal: No nausea, abdominal  "pain  Behavioral/Psych: No insomnia or anxiety   Cardiovascular chest pains, shortness of breath  Objective:       Physical Exam  /84   Pulse 74   Ht 149.9 cm (59\")   Wt 61.7 kg (136 lb)   BMI 27.47 kg/m²   General appearance: No acute changes   Neck: Trachea midline; NECK, supple, no thyromegaly or lymphadenopathy   Lungs: Normal size and shape, normal breath sounds, equal distribution of air, no rales and rhonchi   CV: S1-S2 regular, no murmurs, no rub, no gallop   Abdomen: Soft, non-tender; no masses , no abnormal abdominal sounds   Extremities: No deformity , normal color , no peripheral edema   Skin: Normal temperature, turgor and texture; no rash, ulcers            ECG 12 Lead  Date/Time: 8/12/2019 2:20 PM  Performed by: Stephanie Shanks MD  Authorized by: Stephanie Shanks MD   Comparison: compared with previous ECG   Similar to previous ECG  Rhythm: sinus rhythm  ST Flattening: all    Clinical impression: non-specific ECG              Echocardiogram:        Current Outpatient Medications:   •  albuterol (VENTOLIN HFA) 108 (90 BASE) MCG/ACT inhaler, As Needed., Disp: , Rfl:   •  allopurinol (ZYLOPRIM) 300 MG tablet, Take 1 tablet by mouth Daily., Disp: 90 tablet, Rfl: 3  •  ergocalciferol (DRISDOL) 41112 units capsule, TAKE 1 CAPSULE PO ONCE A WEEK, Disp: 26 capsule, Rfl: 0  •  esomeprazole (NEXIUM) 40 MG capsule, Take 40 mg by mouth Daily., Disp: , Rfl:   •  hydrochlorothiazide (HYDRODIURIL) 12.5 MG tablet, Take 12.5 mg by mouth., Disp: , Rfl:   •  ipratropium-albuterol (DUO-NEB) 0.5-2.5 mg/mL nebulizer, 2 sprays into each nostril daily, Disp: 360 mL, Rfl: 0  •  loratadine (CLARITIN) 10 MG tablet, Take 10 mg by mouth Every Night., Disp: , Rfl:   •  meclizine (ANTIVERT) 25 MG tablet, Take 1 tablet by mouth 3 (Three) Times a Day As Needed for dizziness., Disp: 20 tablet, Rfl: 0  •  metoprolol succinate XL (TOPROL-XL) 25 MG 24 hr tablet, Take 25 mg by mouth., Disp: , Rfl:   •  nitroglycerin " (NITROSTAT) 0.4 MG SL tablet, Place 0.4 mg under the tongue Every 5 (Five) Minutes. prn, Disp: , Rfl:   •  ondansetron ODT (ZOFRAN-ODT) 4 MG disintegrating tablet, Take 1 tablet by mouth Every 6 (Six) Hours As Needed for Nausea., Disp: 10 tablet, Rfl: 0  •  potassium chloride (K-DUR,KLOR-CON) 10 MEQ CR tablet, Take 10 mEq by mouth Daily., Disp: , Rfl: 3   Assessment:        Patient Active Problem List   Diagnosis   • Chest pain   • Colon polyp   • COPD (chronic obstructive pulmonary disease) (CMS/HCC)   • Diverticulosis   • Essential hypertension   • GERD without esophagitis   • Hypercholesterolemia   • Osteopenia   • Multiple thyroid nodules   • Chronic fatigue   • Prediabetes   • Vitamin D deficiency   • Renal insufficiency   • Leg swelling   • Hyperuricemia               Plan:            ICD-10-CM ICD-9-CM   1. Abnormal EKG R94.31 794.31   2. SOB (shortness of breath) R06.02 786.05   3. Essential hypertension I10 401.9   4. Hypercholesterolemia E78.00 272.0   5. Precordial pain R07.2 786.51     1. Abnormal EKG  Considering the patient's symptoms as well as clinical situation and  EKG findings, along with cardiac risk factors, ischemic workup is necessary to rule out ischemic cardiomyopathy, stress induced arrhythmias, and functional capacity for diagnosis as well as prognostic consideration    - Stress Test With Myocardial Perfusion One Day  - Adult Transthoracic Echo Complete W/ Cont if Necessary Per Protocol    2. SOB (shortness of breath)  Considering patient's medical condition as well as the risk factors, patient will require echocardiogram for further evaluation for the LV function, four-chamber evaluation, including the pressures, valvular function and  pericardial disease and pericardial effusion    - Stress Test With Myocardial Perfusion One Day  - Adult Transthoracic Echo Complete W/ Cont if Necessary Per Protocol    3. Essential hypertension  Blood pressure controlled    4.  Hypercholesterolemia  Continue current medications    5. Precordial pain  Considering the patient's symptoms as well as clinical situation and  EKG findings, along with cardiac risk factors, ischemic workup is necessary to rule out ischemic cardiomyopathy, stress induced arrhythmias, and functional capacity for diagnosis as well as prognostic consideration         KIESHA CHONG    SEE IN 2 WKS  COUNSELING:    Mary Saenzeling was given to patient for the following topics: diagnostic results, risk factor reductions, impressions, risks and benefits of treatment options and importance of treatment compliance .       SMOKING COUNSELING:    Counseling given: Not Answered  Comment: 13 years no smoking.       Dictated using Dragon dictation

## 2019-08-20 PROBLEM — R06.02 SOB (SHORTNESS OF BREATH): Status: ACTIVE | Noted: 2019-08-20

## 2019-09-11 ENCOUNTER — HOSPITAL ENCOUNTER (OUTPATIENT)
Dept: CARDIOLOGY | Facility: HOSPITAL | Age: 74
Discharge: HOME | End: 2019-09-11
Admitting: INTERNAL MEDICINE

## 2019-09-11 ENCOUNTER — HOSPITAL ENCOUNTER (OUTPATIENT)
Dept: CARDIOLOGY | Facility: HOSPITAL | Age: 74
Discharge: HOME | End: 2019-09-11

## 2019-09-11 VITALS
SYSTOLIC BLOOD PRESSURE: 128 MMHG | WEIGHT: 136 LBS | BODY MASS INDEX: 27.42 KG/M2 | HEART RATE: 69 BPM | DIASTOLIC BLOOD PRESSURE: 70 MMHG | OXYGEN SATURATION: 100 % | HEIGHT: 59 IN

## 2019-09-11 VITALS
DIASTOLIC BLOOD PRESSURE: 76 MMHG | HEART RATE: 68 BPM | BODY MASS INDEX: 27.42 KG/M2 | WEIGHT: 136 LBS | HEIGHT: 59 IN | SYSTOLIC BLOOD PRESSURE: 165 MMHG

## 2019-09-11 PROCEDURE — 93018 CV STRESS TEST I&R ONLY: CPT | Performed by: INTERNAL MEDICINE

## 2019-09-11 PROCEDURE — 78452 HT MUSCLE IMAGE SPECT MULT: CPT | Performed by: INTERNAL MEDICINE

## 2019-09-11 PROCEDURE — 93306 TTE W/DOPPLER COMPLETE: CPT

## 2019-09-11 PROCEDURE — 93306 TTE W/DOPPLER COMPLETE: CPT | Performed by: INTERNAL MEDICINE

## 2019-09-11 PROCEDURE — 78452 HT MUSCLE IMAGE SPECT MULT: CPT

## 2019-09-11 PROCEDURE — 93016 CV STRESS TEST SUPVJ ONLY: CPT | Performed by: NURSE PRACTITIONER

## 2019-09-11 PROCEDURE — 25010000002 REGADENOSON 0.4 MG/5ML SOLUTION: Performed by: INTERNAL MEDICINE

## 2019-09-11 PROCEDURE — 0 TECHNETIUM SESTAMIBI: Performed by: INTERNAL MEDICINE

## 2019-09-11 PROCEDURE — 93017 CV STRESS TEST TRACING ONLY: CPT

## 2019-09-11 PROCEDURE — A9500 TC99M SESTAMIBI: HCPCS | Performed by: INTERNAL MEDICINE

## 2019-09-11 RX ADMIN — TECHNETIUM TC 99M SESTAMIBI 1 DOSE: 1 INJECTION INTRAVENOUS at 08:21

## 2019-09-11 RX ADMIN — REGADENOSON 0.4 MG: 0.08 INJECTION, SOLUTION INTRAVENOUS at 10:40

## 2019-09-11 RX ADMIN — TECHNETIUM TC 99M SESTAMIBI 1 DOSE: 1 INJECTION INTRAVENOUS at 10:40

## 2019-09-12 LAB
BH CV ECHO MEAS - ACS: 1.8 CM
BH CV ECHO MEAS - AO MAX PG (FULL): 2.7 MMHG
BH CV ECHO MEAS - AO MAX PG: 5.2 MMHG
BH CV ECHO MEAS - AO MEAN PG (FULL): 2 MMHG
BH CV ECHO MEAS - AO MEAN PG: 3 MMHG
BH CV ECHO MEAS - AO ROOT AREA (BSA CORRECTED): 1.9
BH CV ECHO MEAS - AO ROOT AREA: 7.1 CM^2
BH CV ECHO MEAS - AO ROOT DIAM: 3 CM
BH CV ECHO MEAS - AO V2 MAX: 114 CM/SEC
BH CV ECHO MEAS - AO V2 MEAN: 78.1 CM/SEC
BH CV ECHO MEAS - AO V2 VTI: 27.6 CM
BH CV ECHO MEAS - AVA(I,A): 1.8 CM^2
BH CV ECHO MEAS - AVA(I,D): 1.8 CM^2
BH CV ECHO MEAS - AVA(V,A): 2.2 CM^2
BH CV ECHO MEAS - AVA(V,D): 2.2 CM^2
BH CV ECHO MEAS - BSA(HAYCOCK): 1.6 M^2
BH CV ECHO MEAS - BSA: 1.6 M^2
BH CV ECHO MEAS - BZI_BMI: 27.5 KILOGRAMS/M^2
BH CV ECHO MEAS - BZI_METRIC_HEIGHT: 149.9 CM
BH CV ECHO MEAS - BZI_METRIC_WEIGHT: 61.7 KG
BH CV ECHO MEAS - EDV(CUBED): 74.1 ML
BH CV ECHO MEAS - EDV(MOD-SP2): 44 ML
BH CV ECHO MEAS - EDV(MOD-SP4): 64 ML
BH CV ECHO MEAS - EDV(TEICH): 78.6 ML
BH CV ECHO MEAS - EF(CUBED): 78.9 %
BH CV ECHO MEAS - EF(MOD-BP): 64 %
BH CV ECHO MEAS - EF(MOD-SP2): 61.4 %
BH CV ECHO MEAS - EF(MOD-SP4): 67.2 %
BH CV ECHO MEAS - EF(TEICH): 71.6 %
BH CV ECHO MEAS - ESV(CUBED): 15.6 ML
BH CV ECHO MEAS - ESV(MOD-SP2): 17 ML
BH CV ECHO MEAS - ESV(MOD-SP4): 21 ML
BH CV ECHO MEAS - ESV(TEICH): 22.3 ML
BH CV ECHO MEAS - FS: 40.5 %
BH CV ECHO MEAS - IVS/LVPW: 1.1
BH CV ECHO MEAS - IVSD: 1.1 CM
BH CV ECHO MEAS - LA DIMENSION: 4 CM
BH CV ECHO MEAS - LA/AO: 1.3
BH CV ECHO MEAS - LAT PEAK E' VEL: 7.4 CM/SEC
BH CV ECHO MEAS - LV DIASTOLIC VOL/BSA (35-75): 40.9 ML/M^2
BH CV ECHO MEAS - LV MASS(C)D: 147 GRAMS
BH CV ECHO MEAS - LV MASS(C)DI: 93.9 GRAMS/M^2
BH CV ECHO MEAS - LV MAX PG: 2.5 MMHG
BH CV ECHO MEAS - LV MEAN PG: 1 MMHG
BH CV ECHO MEAS - LV SYSTOLIC VOL/BSA (12-30): 13.4 ML/M^2
BH CV ECHO MEAS - LV V1 MAX: 79.8 CM/SEC
BH CV ECHO MEAS - LV V1 MEAN: 51.5 CM/SEC
BH CV ECHO MEAS - LV V1 VTI: 15.7 CM
BH CV ECHO MEAS - LVIDD: 4.2 CM
BH CV ECHO MEAS - LVIDS: 2.5 CM
BH CV ECHO MEAS - LVLD AP2: 6.4 CM
BH CV ECHO MEAS - LVLD AP4: 6.9 CM
BH CV ECHO MEAS - LVLS AP2: 5.1 CM
BH CV ECHO MEAS - LVLS AP4: 5.7 CM
BH CV ECHO MEAS - LVOT AREA (M): 3.1 CM^2
BH CV ECHO MEAS - LVOT AREA: 3.1 CM^2
BH CV ECHO MEAS - LVOT DIAM: 2 CM
BH CV ECHO MEAS - LVPWD: 1 CM
BH CV ECHO MEAS - MED PEAK E' VEL: 7.2 CM/SEC
BH CV ECHO MEAS - MV A DUR: 0.13 SEC
BH CV ECHO MEAS - MV A MAX VEL: 120 CM/SEC
BH CV ECHO MEAS - MV DEC SLOPE: 327 CM/SEC^2
BH CV ECHO MEAS - MV DEC TIME: 0.21 SEC
BH CV ECHO MEAS - MV E MAX VEL: 82 CM/SEC
BH CV ECHO MEAS - MV E/A: 0.68
BH CV ECHO MEAS - MV MAX PG: 9.5 MMHG
BH CV ECHO MEAS - MV MEAN PG: 3 MMHG
BH CV ECHO MEAS - MV P1/2T MAX VEL: 102 CM/SEC
BH CV ECHO MEAS - MV P1/2T: 91.4 MSEC
BH CV ECHO MEAS - MV V2 MAX: 154 CM/SEC
BH CV ECHO MEAS - MV V2 MEAN: 83.8 CM/SEC
BH CV ECHO MEAS - MV V2 VTI: 37.5 CM
BH CV ECHO MEAS - MVA P1/2T LCG: 2.2 CM^2
BH CV ECHO MEAS - MVA(P1/2T): 2.4 CM^2
BH CV ECHO MEAS - MVA(VTI): 1.3 CM^2
BH CV ECHO MEAS - PA ACC TIME: 0.15 SEC
BH CV ECHO MEAS - PA MAX PG (FULL): 1.5 MMHG
BH CV ECHO MEAS - PA MAX PG: 3.3 MMHG
BH CV ECHO MEAS - PA PR(ACCEL): 12.4 MMHG
BH CV ECHO MEAS - PA V2 MAX: 90.9 CM/SEC
BH CV ECHO MEAS - PULM A REVS DUR: 0.14 SEC
BH CV ECHO MEAS - PULM A REVS VEL: 33.5 CM/SEC
BH CV ECHO MEAS - PULM DIAS VEL: 30.1 CM/SEC
BH CV ECHO MEAS - PULM S/D: 1.7
BH CV ECHO MEAS - PULM SYS VEL: 50 CM/SEC
BH CV ECHO MEAS - PVA(V,A): 1.9 CM^2
BH CV ECHO MEAS - PVA(V,D): 1.9 CM^2
BH CV ECHO MEAS - QP/QS: 0.87
BH CV ECHO MEAS - RAP SYSTOLE: 3 MMHG
BH CV ECHO MEAS - RV MAX PG: 1.8 MMHG
BH CV ECHO MEAS - RV MEAN PG: 1 MMHG
BH CV ECHO MEAS - RV V1 MAX: 68 CM/SEC
BH CV ECHO MEAS - RV V1 MEAN: 48.4 CM/SEC
BH CV ECHO MEAS - RV V1 VTI: 16.8 CM
BH CV ECHO MEAS - RVDD: 2.1 CM
BH CV ECHO MEAS - RVOT AREA: 2.5 CM^2
BH CV ECHO MEAS - RVOT DIAM: 1.8 CM
BH CV ECHO MEAS - RVSP: 22.2 MMHG
BH CV ECHO MEAS - SI(AO): 124.6 ML/M^2
BH CV ECHO MEAS - SI(CUBED): 37.4 ML/M^2
BH CV ECHO MEAS - SI(LVOT): 31.5 ML/M^2
BH CV ECHO MEAS - SI(MOD-SP2): 17.2 ML/M^2
BH CV ECHO MEAS - SI(MOD-SP4): 27.5 ML/M^2
BH CV ECHO MEAS - SI(TEICH): 35.9 ML/M^2
BH CV ECHO MEAS - SV(AO): 195.1 ML
BH CV ECHO MEAS - SV(CUBED): 58.5 ML
BH CV ECHO MEAS - SV(LVOT): 49.3 ML
BH CV ECHO MEAS - SV(MOD-SP2): 27 ML
BH CV ECHO MEAS - SV(MOD-SP4): 43 ML
BH CV ECHO MEAS - SV(RVOT): 42.8 ML
BH CV ECHO MEAS - SV(TEICH): 56.3 ML
BH CV ECHO MEAS - TAPSE (>1.6): 1.7 CM
BH CV ECHO MEAS - TR MAX VEL: 219 CM/SEC
BH CV ECHO MEASUREMENTS AVERAGE E/E' RATIO: 11.23
BH CV STRESS BP STAGE 1: NORMAL
BH CV STRESS COMMENTS STAGE 1: NORMAL
BH CV STRESS DOSE REGADENOSON STAGE 1: 0.4
BH CV STRESS DURATION MIN STAGE 1: 2
BH CV STRESS DURATION SEC STAGE 1: 31
BH CV STRESS GRADE STAGE 1: 1
BH CV STRESS HR STAGE 1: 114
BH CV STRESS METS STAGE 1: 1.4
BH CV STRESS PROTOCOL 1: NORMAL
BH CV STRESS RECOVERY BP: NORMAL MMHG
BH CV STRESS RECOVERY HR: 92 BPM
BH CV STRESS RECOVERY O2: 100 %
BH CV STRESS SPEED STAGE 1: 0.5
BH CV STRESS STAGE 1: 1
BH CV XLRA - RV BASE: 3 CM
BH CV XLRA - RV LENGTH: 5.6 CM
BH CV XLRA - RV MID: 1.7 CM
BH CV XLRA - TDI S': 12.6 CM/SEC
LEFT ATRIUM VOLUME INDEX: 19 ML/M2
LV EF NUC BP: 77 %
MAXIMAL PREDICTED HEART RATE: 146 BPM
MAXIMAL PREDICTED HEART RATE: 146 BPM
PERCENT MAX PREDICTED HR: 78.08 %
STRESS BASELINE BP: NORMAL MMHG
STRESS BASELINE HR: 69 BPM
STRESS O2 SAT REST: 100 %
STRESS PERCENT HR: 92 %
STRESS POST ESTIMATED WORKLOAD: 1.4 METS
STRESS POST EXERCISE DUR MIN: 2 MIN
STRESS POST EXERCISE DUR SEC: 31 SEC
STRESS POST PEAK BP: NORMAL MMHG
STRESS POST PEAK HR: 114 BPM
STRESS TARGET HR: 124 BPM
STRESS TARGET HR: 124 BPM

## 2019-09-18 ENCOUNTER — OFFICE VISIT (OUTPATIENT)
Dept: CARDIOLOGY | Age: 74
End: 2019-09-18

## 2019-09-18 VITALS
WEIGHT: 139 LBS | SYSTOLIC BLOOD PRESSURE: 149 MMHG | DIASTOLIC BLOOD PRESSURE: 75 MMHG | HEIGHT: 59 IN | BODY MASS INDEX: 28.02 KG/M2 | HEART RATE: 73 BPM

## 2019-09-18 DIAGNOSIS — I10 ESSENTIAL HYPERTENSION: ICD-10-CM

## 2019-09-18 DIAGNOSIS — R06.02 SOB (SHORTNESS OF BREATH): Primary | ICD-10-CM

## 2019-09-18 DIAGNOSIS — E78.00 HYPERCHOLESTEROLEMIA: ICD-10-CM

## 2019-09-18 PROCEDURE — 99213 OFFICE O/P EST LOW 20 MIN: CPT | Performed by: INTERNAL MEDICINE

## 2019-10-19 LAB
25(OH)D3+25(OH)D2 SERPL-MCNC: 70.8 NG/ML (ref 30–100)
ALBUMIN SERPL-MCNC: 4.4 G/DL (ref 3.5–5.2)
ALBUMIN/GLOB SERPL: 2.1 G/DL
ALP SERPL-CCNC: 81 U/L (ref 39–117)
ALT SERPL-CCNC: 16 U/L (ref 1–33)
AST SERPL-CCNC: 19 U/L (ref 1–32)
BILIRUB SERPL-MCNC: 0.2 MG/DL (ref 0.2–1.2)
BUN SERPL-MCNC: 28 MG/DL (ref 8–23)
BUN/CREAT SERPL: 26.7 (ref 7–25)
CALCIUM SERPL-MCNC: 9.4 MG/DL (ref 8.6–10.5)
CHLORIDE SERPL-SCNC: 105 MMOL/L (ref 98–107)
CHOLEST SERPL-MCNC: 201 MG/DL (ref 0–200)
CO2 SERPL-SCNC: 25 MMOL/L (ref 22–29)
CREAT SERPL-MCNC: 1.05 MG/DL (ref 0.57–1)
GLOBULIN SER CALC-MCNC: 2.1 GM/DL
GLUCOSE SERPL-MCNC: 94 MG/DL (ref 65–99)
HBA1C MFR BLD: 5.8 % (ref 4.8–5.6)
HDLC SERPL-MCNC: 75 MG/DL (ref 40–60)
INTERPRETATION: NORMAL
LDLC SERPL CALC-MCNC: 91 MG/DL (ref 0–100)
Lab: NORMAL
POTASSIUM SERPL-SCNC: 4.7 MMOL/L (ref 3.5–5.2)
PROT SERPL-MCNC: 6.5 G/DL (ref 6–8.5)
SODIUM SERPL-SCNC: 144 MMOL/L (ref 136–145)
T3FREE SERPL-MCNC: 2.3 PG/ML (ref 2–4.4)
T4 FREE SERPL-MCNC: 1.25 NG/DL (ref 0.93–1.7)
T4 SERPL-MCNC: 7.59 MCG/DL (ref 4.5–11.7)
THYROGLOB AB SERPL-ACNC: <1 IU/ML
THYROGLOB SERPL-MCNC: 5.6 NG/ML
THYROGLOB SERPL-MCNC: NORMAL NG/ML
TRIGL SERPL-MCNC: 173 MG/DL (ref 0–150)
TSH SERPL DL<=0.005 MIU/L-ACNC: 1.26 UIU/ML (ref 0.27–4.2)
URATE SERPL-MCNC: 4.5 MG/DL (ref 2.4–5.7)
VLDLC SERPL CALC-MCNC: 34.6 MG/DL

## 2019-10-30 ENCOUNTER — OFFICE VISIT (OUTPATIENT)
Dept: ENDOCRINOLOGY | Age: 74
End: 2019-10-30

## 2019-10-30 VITALS
BODY MASS INDEX: 27.46 KG/M2 | WEIGHT: 136.2 LBS | DIASTOLIC BLOOD PRESSURE: 74 MMHG | HEIGHT: 59 IN | SYSTOLIC BLOOD PRESSURE: 118 MMHG

## 2019-10-30 DIAGNOSIS — E79.0 HYPERURICEMIA: ICD-10-CM

## 2019-10-30 DIAGNOSIS — E78.00 HYPERCHOLESTEROLEMIA: ICD-10-CM

## 2019-10-30 DIAGNOSIS — I10 ESSENTIAL HYPERTENSION: ICD-10-CM

## 2019-10-30 DIAGNOSIS — E04.2 MULTIPLE THYROID NODULES: Primary | ICD-10-CM

## 2019-10-30 DIAGNOSIS — E55.9 VITAMIN D DEFICIENCY: ICD-10-CM

## 2019-10-30 DIAGNOSIS — R73.03 PREDIABETES: ICD-10-CM

## 2019-10-30 DIAGNOSIS — R53.82 CHRONIC FATIGUE: ICD-10-CM

## 2019-10-30 PROCEDURE — 99214 OFFICE O/P EST MOD 30 MIN: CPT | Performed by: INTERNAL MEDICINE

## 2019-10-30 RX ORDER — METOPROLOL SUCCINATE 25 MG/1
50 TABLET, EXTENDED RELEASE ORAL DAILY
COMMUNITY
Start: 2019-10-24 | End: 2021-03-05

## 2019-10-30 RX ORDER — ALLOPURINOL 100 MG/1
100 TABLET ORAL DAILY
Qty: 90 TABLET | Refills: 3 | Status: SHIPPED | OUTPATIENT
Start: 2019-10-30

## 2019-10-30 RX ORDER — ERGOCALCIFEROL 1.25 MG/1
CAPSULE ORAL
Qty: 26 CAPSULE | Refills: 3 | Status: SHIPPED | OUTPATIENT
Start: 2019-10-30 | End: 2020-03-16

## 2019-10-30 RX ORDER — ALLOPURINOL 100 MG/1
100 TABLET ORAL DAILY
Refills: 3 | COMMUNITY
Start: 2019-09-20 | End: 2019-10-30 | Stop reason: SDUPTHER

## 2019-10-30 NOTE — PROGRESS NOTES
"Subjective   Mary Boyle is a 73 y.o. female seen for follow up for thyroid nodule, goiter, vit d deficiency, lab review. Patient states that her potassium pills are getting stuck in her throat.    /74   Ht 149.9 cm (59\")   Wt 61.8 kg (136 lb 3.2 oz)   BMI 27.51 kg/m²     Allergies   Allergen Reactions   • Iodinated Diagnostic Agents Shortness Of Breath     Asthma attack and hives   • Aspirin Other (See Comments)     WHEEZING  WHEEZING  WHEEZING   • Doxycycline Other (See Comments)   • Sertraline Diarrhea, Nausea And Vomiting and Other (See Comments)     CHEST PAIN   CHEST PAIN   CHEST PAIN          Current Outpatient Medications:   •  albuterol (VENTOLIN HFA) 108 (90 BASE) MCG/ACT inhaler, As Needed., Disp: , Rfl:   •  allopurinol (ZYLOPRIM) 100 MG tablet, Take 100 mg by mouth Daily., Disp: , Rfl: 3  •  ergocalciferol (DRISDOL) 76506 units capsule, TAKE 1 CAPSULE PO ONCE A WEEK, Disp: 26 capsule, Rfl: 0  •  ipratropium-albuterol (DUO-NEB) 0.5-2.5 mg/mL nebulizer, 2 sprays into each nostril daily, Disp: 360 mL, Rfl: 0  •  loratadine (CLARITIN) 10 MG tablet, Take 10 mg by mouth Every Night., Disp: , Rfl:   •  meclizine (ANTIVERT) 25 MG tablet, Take 1 tablet by mouth 3 (Three) Times a Day As Needed for dizziness., Disp: 20 tablet, Rfl: 0  •  metoprolol succinate XL (TOPROL-XL) 25 MG 24 hr tablet, , Disp: , Rfl:   •  potassium chloride (K-DUR,KLOR-CON) 10 MEQ CR tablet, Take 10 mEq by mouth Daily., Disp: , Rfl: 3      History of Present Illness this is a 73-year-old female known patient with thyroid nodules as well as vitamin D deficiency and chronic fatigue and hyperuricemia.  Over the course of last 6 months she has had no significant health problem for which to go to the ER or hospital.    The following portions of the patient's history were reviewed and updated as appropriate: allergies, current medications, past family history, past medical history, past social history, past surgical history and " problem list.    Review of Systems   Constitutional: Negative.    HENT: Negative.    Eyes: Negative.    Respiratory: Negative.    Cardiovascular: Negative.    Gastrointestinal: Negative.    Endocrine: Negative.    Genitourinary: Negative.    Musculoskeletal: Negative.    Skin: Negative.    Allergic/Immunologic: Negative.    Neurological: Negative.    Hematological: Negative.    Psychiatric/Behavioral: Negative.    The above review of system was reviewed, corroborated and confirmed.    Objective      Lab Results   Component Value Date    GLUCOSE 113 (H) 02/16/2019    BUN 28 (H) 10/17/2019    CREATININE 1.05 (H) 10/17/2019    EGFRIFNONA 51 (L) 10/17/2019    EGFRIFAFRI 62 10/17/2019    BCR 26.7 (H) 10/17/2019    K 4.7 10/17/2019    CO2 25.0 10/17/2019    CALCIUM 9.4 10/17/2019    PROTENTOTREF 6.5 10/17/2019    ALBUMIN 4.40 10/17/2019    LABIL2 2.1 10/17/2019    AST 19 10/17/2019    ALT 16 10/17/2019       Lab Results   Component Value Date    HGBA1C 5.80 (H) 10/17/2019     Lab Results   Component Value Date    CHLPL 201 (H) 10/17/2019    CHLPL 183 04/10/2019    CHLPL 194 03/07/2019     Lab Results   Component Value Date    TRIG 173 (H) 10/17/2019    TRIG 82 04/10/2019    TRIG 168 (H) 03/07/2019     Lab Results   Component Value Date    HDL 75 (H) 10/17/2019    HDL 76 04/10/2019    HDL 83 (H) 03/07/2019     Lab Results   Component Value Date    LDL 91 10/17/2019    LDL 91 04/10/2019    LDL 77 03/07/2019     Lab Results   Component Value Date    TSH 1.260 10/17/2019         Physical Exam   Constitutional: She is oriented to person, place, and time. She appears well-developed and well-nourished. No distress.   HENT:   Head: Normocephalic and atraumatic.   Right Ear: External ear normal.   Left Ear: External ear normal.   Nose: Nose normal.   Mouth/Throat: Oropharynx is clear and moist. No oropharyngeal exudate.   Eyes: Conjunctivae and EOM are normal. Pupils are equal, round, and reactive to light. Right eye exhibits no  discharge. Left eye exhibits no discharge. No scleral icterus.   Neck: Trachea normal, normal range of motion and full passive range of motion without pain. Neck supple. No JVD present. No tracheal tenderness present. Carotid bruit is not present. No tracheal deviation, no edema and no erythema present. Thyromegaly present. No thyroid mass present.   Soft multiple nodules on both lobes of the thyroid more prominent on the right side.  They move freely with swallowing and are nontender.   Cardiovascular: Normal rate, regular rhythm, normal heart sounds and intact distal pulses. Exam reveals no gallop and no friction rub.   No murmur heard.  Pulmonary/Chest: Effort normal and breath sounds normal. No stridor. No respiratory distress. She has no wheezes. She has no rales. She exhibits no tenderness.   Abdominal: Soft. Bowel sounds are normal. She exhibits no distension and no mass. There is no tenderness. There is no rebound and no guarding. No hernia.   Musculoskeletal: Normal range of motion. She exhibits no edema, tenderness or deformity.   Lymphadenopathy:     She has no cervical adenopathy.   Neurological: She is alert and oriented to person, place, and time. She has normal reflexes. She displays normal reflexes. No cranial nerve deficit or sensory deficit. She exhibits normal muscle tone. Coordination normal.   Skin: Skin is warm and dry. No rash noted. She is not diaphoretic. No erythema. No pallor.   Psychiatric: She has a normal mood and affect. Her behavior is normal. Judgment and thought content normal.   Nursing note and vitals reviewed.  No significant change from 9/12/2018 office visit.      Assessment/Plan   Diagnoses and all orders for this visit:    Multiple thyroid nodules  -     T4 & TSH ([facility]); Future  -     T3, Free; Future  -     T4, Free; Future  -     Comprehensive Thyroglobulin; Future  -     Uric Acid; Future  -     Vitamin D 25 Hydroxy; Future  -     Comprehensive Metabolic Panel;  Future  -     C-Peptide; Future  -     Hemoglobin A1c; Future  -     NMR LipoProfile; Future    Essential hypertension  -     T4 & TSH ([facility]); Future  -     T3, Free; Future  -     T4, Free; Future  -     Comprehensive Thyroglobulin; Future  -     Uric Acid; Future  -     Vitamin D 25 Hydroxy; Future  -     Comprehensive Metabolic Panel; Future  -     C-Peptide; Future  -     Hemoglobin A1c; Future  -     NMR LipoProfile; Future    Hypercholesterolemia  -     T4 & TSH ([facility]); Future  -     T3, Free; Future  -     T4, Free; Future  -     Comprehensive Thyroglobulin; Future  -     Uric Acid; Future  -     Vitamin D 25 Hydroxy; Future  -     Comprehensive Metabolic Panel; Future  -     C-Peptide; Future  -     Hemoglobin A1c; Future  -     NMR LipoProfile; Future    Vitamin D deficiency  -     T4 & TSH ([facility]); Future  -     T3, Free; Future  -     T4, Free; Future  -     Comprehensive Thyroglobulin; Future  -     Uric Acid; Future  -     Vitamin D 25 Hydroxy; Future  -     Comprehensive Metabolic Panel; Future  -     C-Peptide; Future  -     Hemoglobin A1c; Future  -     NMR LipoProfile; Future    Chronic fatigue  -     T4 & TSH ([facility]); Future  -     T3, Free; Future  -     T4, Free; Future  -     Comprehensive Thyroglobulin; Future  -     Uric Acid; Future  -     Vitamin D 25 Hydroxy; Future  -     Comprehensive Metabolic Panel; Future  -     C-Peptide; Future  -     Hemoglobin A1c; Future  -     NMR LipoProfile; Future    Prediabetes  -     T4 & TSH ([facility]); Future  -     T3, Free; Future  -     T4, Free; Future  -     Comprehensive Thyroglobulin; Future  -     Uric Acid; Future  -     Vitamin D 25 Hydroxy; Future  -     Comprehensive Metabolic Panel; Future  -     C-Peptide; Future  -     Hemoglobin A1c; Future  -     NMR LipoProfile; Future    Hyperuricemia  -     T4 & TSH ([facility]); Future  -     T3, Free; Future  -     T4, Free; Future  -     Comprehensive Thyroglobulin; Future  -     Uric Acid;  Future  -     Vitamin D 25 Hydroxy; Future  -     Comprehensive Metabolic Panel; Future  -     C-Peptide; Future  -     Hemoglobin A1c; Future  -     NMR LipoProfile; Future    Other orders  -     ergocalciferol (DRISDOL) 1.25 MG (31271 UT) capsule; TAKE 1 CAPSULE PO ONCE A WEEK  -     allopurinol (ZYLOPRIM) 100 MG tablet; Take 1 tablet by mouth Daily.        In summary I saw and examined this 73-year-old female for above-mentioned problems.  I reviewed her laboratory evaluations of 10/17/2019 and provided her a hard copy of it.  I also reviewed her thyroid ultrasound dated 3/15/2018 which concludes there is stable nature of her nodules.  Overall she is clinically and metabolically stable and therefore we will go ahead and continue her current prescriptions.  She will see Ms. Moriah Enriquez in 6 months or sooner if needed with laboratory evaluation prior to each office visit.

## 2020-03-16 RX ORDER — ERGOCALCIFEROL 1.25 MG/1
CAPSULE ORAL
Qty: 13 CAPSULE | Refills: 0 | Status: SHIPPED | OUTPATIENT
Start: 2020-03-16 | End: 2020-06-09

## 2020-04-16 ENCOUNTER — RESULTS ENCOUNTER (OUTPATIENT)
Dept: ENDOCRINOLOGY | Age: 75
End: 2020-04-16

## 2020-04-16 DIAGNOSIS — R53.82 CHRONIC FATIGUE: ICD-10-CM

## 2020-04-16 DIAGNOSIS — R73.03 PREDIABETES: ICD-10-CM

## 2020-04-16 DIAGNOSIS — E78.00 HYPERCHOLESTEROLEMIA: ICD-10-CM

## 2020-04-16 DIAGNOSIS — E04.2 MULTIPLE THYROID NODULES: ICD-10-CM

## 2020-04-16 DIAGNOSIS — E55.9 VITAMIN D DEFICIENCY: ICD-10-CM

## 2020-04-16 DIAGNOSIS — I10 ESSENTIAL HYPERTENSION: ICD-10-CM

## 2020-04-16 DIAGNOSIS — E79.0 HYPERURICEMIA: ICD-10-CM

## 2020-06-09 RX ORDER — ERGOCALCIFEROL 1.25 MG/1
CAPSULE ORAL
Qty: 13 CAPSULE | Refills: 0 | Status: ON HOLD | OUTPATIENT
Start: 2020-06-09 | End: 2020-11-09

## 2020-07-25 ENCOUNTER — TELEPHONE ENCOUNTER (OUTPATIENT)
Dept: URBAN - METROPOLITAN AREA CLINIC 13 | Facility: CLINIC | Age: 75
End: 2020-07-25

## 2020-07-25 RX ORDER — LEVOTHYROXINE SODIUM 0.17 MG/1
TAKE 1 TABLET DAILY TABLET ORAL
Refills: 0 | OUTPATIENT
Start: 2010-08-09 | End: 2010-08-25

## 2020-07-26 ENCOUNTER — TELEPHONE ENCOUNTER (OUTPATIENT)
Dept: URBAN - METROPOLITAN AREA CLINIC 13 | Facility: CLINIC | Age: 75
End: 2020-07-26

## 2020-08-03 RX ORDER — ERGOCALCIFEROL 1.25 MG/1
CAPSULE ORAL
Qty: 13 CAPSULE | Refills: 0 | OUTPATIENT
Start: 2020-08-03

## 2020-11-09 ENCOUNTER — APPOINTMENT (OUTPATIENT)
Dept: GENERAL RADIOLOGY | Facility: HOSPITAL | Age: 75
End: 2020-11-09

## 2020-11-09 ENCOUNTER — HOSPITAL ENCOUNTER (INPATIENT)
Facility: HOSPITAL | Age: 75
LOS: 5 days | Discharge: NURSING, REHAB OR LONG-TERM CARE | End: 2020-11-14
Attending: EMERGENCY MEDICINE | Admitting: HOSPITALIST

## 2020-11-09 ENCOUNTER — APPOINTMENT (OUTPATIENT)
Dept: CT IMAGING | Facility: HOSPITAL | Age: 75
End: 2020-11-09

## 2020-11-09 DIAGNOSIS — R42 DIZZINESS: ICD-10-CM

## 2020-11-09 DIAGNOSIS — I44.7 LEFT BUNDLE BRANCH BLOCK: ICD-10-CM

## 2020-11-09 DIAGNOSIS — R53.1 GENERALIZED WEAKNESS: Primary | ICD-10-CM

## 2020-11-09 DIAGNOSIS — G91.2 NPH (NORMAL PRESSURE HYDROCEPHALUS) (HCC): ICD-10-CM

## 2020-11-09 DIAGNOSIS — I10 ESSENTIAL HYPERTENSION: ICD-10-CM

## 2020-11-09 LAB
ALBUMIN SERPL-MCNC: 4.1 G/DL (ref 3.5–5.2)
ALBUMIN/GLOB SERPL: 1.7 G/DL
ALP SERPL-CCNC: 59 U/L (ref 39–117)
ALT SERPL W P-5'-P-CCNC: 21 U/L (ref 1–33)
ANION GAP SERPL CALCULATED.3IONS-SCNC: 12.1 MMOL/L (ref 5–15)
AST SERPL-CCNC: 37 U/L (ref 1–32)
BASOPHILS # BLD AUTO: 0.02 10*3/MM3 (ref 0–0.2)
BASOPHILS NFR BLD AUTO: 0.3 % (ref 0–1.5)
BILIRUB SERPL-MCNC: 0.4 MG/DL (ref 0–1.2)
BILIRUB UR QL STRIP: NEGATIVE
BUN SERPL-MCNC: 20 MG/DL (ref 8–23)
BUN/CREAT SERPL: 17.2 (ref 7–25)
CALCIUM SPEC-SCNC: 9.3 MG/DL (ref 8.6–10.5)
CHLORIDE SERPL-SCNC: 102 MMOL/L (ref 98–107)
CLARITY UR: CLEAR
CO2 SERPL-SCNC: 26.9 MMOL/L (ref 22–29)
COLOR UR: YELLOW
CREAT SERPL-MCNC: 1.16 MG/DL (ref 0.57–1)
DEPRECATED RDW RBC AUTO: 42.9 FL (ref 37–54)
EOSINOPHIL # BLD AUTO: 0.04 10*3/MM3 (ref 0–0.4)
EOSINOPHIL NFR BLD AUTO: 0.6 % (ref 0.3–6.2)
ERYTHROCYTE [DISTWIDTH] IN BLOOD BY AUTOMATED COUNT: 13.4 % (ref 12.3–15.4)
GFR SERPL CREATININE-BSD FRML MDRD: 46 ML/MIN/1.73
GLOBULIN UR ELPH-MCNC: 2.4 GM/DL
GLUCOSE SERPL-MCNC: 87 MG/DL (ref 65–99)
GLUCOSE UR STRIP-MCNC: NEGATIVE MG/DL
HCT VFR BLD AUTO: 37.5 % (ref 34–46.6)
HGB BLD-MCNC: 12.4 G/DL (ref 12–15.9)
HGB UR QL STRIP.AUTO: NEGATIVE
HOLD SPECIMEN: NORMAL
HOLD SPECIMEN: NORMAL
IMM GRANULOCYTES # BLD AUTO: 0.03 10*3/MM3 (ref 0–0.05)
IMM GRANULOCYTES NFR BLD AUTO: 0.4 % (ref 0–0.5)
KETONES UR QL STRIP: ABNORMAL
LEUKOCYTE ESTERASE UR QL STRIP.AUTO: NEGATIVE
LYMPHOCYTES # BLD AUTO: 1.14 10*3/MM3 (ref 0.7–3.1)
LYMPHOCYTES NFR BLD AUTO: 15.8 % (ref 19.6–45.3)
MAGNESIUM SERPL-MCNC: 1.8 MG/DL (ref 1.6–2.4)
MCH RBC QN AUTO: 28.8 PG (ref 26.6–33)
MCHC RBC AUTO-ENTMCNC: 33.1 G/DL (ref 31.5–35.7)
MCV RBC AUTO: 87 FL (ref 79–97)
MONOCYTES # BLD AUTO: 0.34 10*3/MM3 (ref 0.1–0.9)
MONOCYTES NFR BLD AUTO: 4.7 % (ref 5–12)
NEUTROPHILS NFR BLD AUTO: 5.65 10*3/MM3 (ref 1.7–7)
NEUTROPHILS NFR BLD AUTO: 78.2 % (ref 42.7–76)
NITRITE UR QL STRIP: NEGATIVE
NRBC BLD AUTO-RTO: 0 /100 WBC (ref 0–0.2)
PH UR STRIP.AUTO: 7 [PH] (ref 5–8)
PLATELET # BLD AUTO: 224 10*3/MM3 (ref 140–450)
PMV BLD AUTO: 11.1 FL (ref 6–12)
POTASSIUM SERPL-SCNC: 3.8 MMOL/L (ref 3.5–5.2)
PROT SERPL-MCNC: 6.5 G/DL (ref 6–8.5)
PROT UR QL STRIP: NEGATIVE
QT INTERVAL: 418 MS
QT INTERVAL: 430 MS
RBC # BLD AUTO: 4.31 10*6/MM3 (ref 3.77–5.28)
SODIUM SERPL-SCNC: 141 MMOL/L (ref 136–145)
SP GR UR STRIP: 1.02 (ref 1–1.03)
TROPONIN T SERPL-MCNC: <0.01 NG/ML (ref 0–0.03)
UROBILINOGEN UR QL STRIP: ABNORMAL
WBC # BLD AUTO: 7.22 10*3/MM3 (ref 3.4–10.8)
WHOLE BLOOD HOLD SPECIMEN: NORMAL
WHOLE BLOOD HOLD SPECIMEN: NORMAL

## 2020-11-09 PROCEDURE — 71045 X-RAY EXAM CHEST 1 VIEW: CPT

## 2020-11-09 PROCEDURE — 81003 URINALYSIS AUTO W/O SCOPE: CPT

## 2020-11-09 PROCEDURE — P9612 CATHETERIZE FOR URINE SPEC: HCPCS

## 2020-11-09 PROCEDURE — 93010 ELECTROCARDIOGRAM REPORT: CPT | Performed by: INTERNAL MEDICINE

## 2020-11-09 PROCEDURE — U0004 COV-19 TEST NON-CDC HGH THRU: HCPCS | Performed by: NURSE PRACTITIONER

## 2020-11-09 PROCEDURE — 99285 EMERGENCY DEPT VISIT HI MDM: CPT

## 2020-11-09 PROCEDURE — G0378 HOSPITAL OBSERVATION PER HR: HCPCS

## 2020-11-09 PROCEDURE — 93005 ELECTROCARDIOGRAM TRACING: CPT | Performed by: EMERGENCY MEDICINE

## 2020-11-09 PROCEDURE — 85025 COMPLETE CBC W/AUTO DIFF WBC: CPT

## 2020-11-09 PROCEDURE — 70450 CT HEAD/BRAIN W/O DYE: CPT

## 2020-11-09 PROCEDURE — 84484 ASSAY OF TROPONIN QUANT: CPT

## 2020-11-09 PROCEDURE — 80053 COMPREHEN METABOLIC PANEL: CPT

## 2020-11-09 PROCEDURE — 83735 ASSAY OF MAGNESIUM: CPT

## 2020-11-09 PROCEDURE — 93005 ELECTROCARDIOGRAM TRACING: CPT

## 2020-11-09 RX ORDER — ALLOPURINOL 100 MG/1
100 TABLET ORAL DAILY
Status: DISCONTINUED | OUTPATIENT
Start: 2020-11-09 | End: 2020-11-14 | Stop reason: HOSPADM

## 2020-11-09 RX ORDER — SODIUM CHLORIDE 0.9 % (FLUSH) 0.9 %
10 SYRINGE (ML) INJECTION AS NEEDED
Status: DISCONTINUED | OUTPATIENT
Start: 2020-11-09 | End: 2020-11-14

## 2020-11-09 RX ORDER — SODIUM CHLORIDE 9 MG/ML
75 INJECTION, SOLUTION INTRAVENOUS CONTINUOUS
Status: DISCONTINUED | OUTPATIENT
Start: 2020-11-09 | End: 2020-11-10

## 2020-11-09 RX ORDER — METOPROLOL SUCCINATE 50 MG/1
50 TABLET, EXTENDED RELEASE ORAL
Status: DISCONTINUED | OUTPATIENT
Start: 2020-11-09 | End: 2020-11-10

## 2020-11-09 RX ORDER — ALBUTEROL SULFATE 2.5 MG/3ML
2.5 SOLUTION RESPIRATORY (INHALATION) EVERY 6 HOURS PRN
Status: DISCONTINUED | OUTPATIENT
Start: 2020-11-09 | End: 2020-11-10

## 2020-11-09 RX ORDER — ACETAMINOPHEN 325 MG/1
650 TABLET ORAL EVERY 4 HOURS PRN
Status: DISCONTINUED | OUTPATIENT
Start: 2020-11-09 | End: 2020-11-14 | Stop reason: HOSPADM

## 2020-11-09 RX ADMIN — METOPROLOL TARTRATE 25 MG: 25 TABLET, FILM COATED ORAL at 16:06

## 2020-11-09 RX ADMIN — METOPROLOL SUCCINATE 50 MG: 50 TABLET, EXTENDED RELEASE ORAL at 21:48

## 2020-11-09 RX ADMIN — ALLOPURINOL 100 MG: 100 TABLET ORAL at 21:48

## 2020-11-09 RX ADMIN — SODIUM CHLORIDE, PRESERVATIVE FREE 10 ML: 5 INJECTION INTRAVENOUS at 21:49

## 2020-11-09 RX ADMIN — ACETAMINOPHEN 650 MG: 325 TABLET, FILM COATED ORAL at 22:22

## 2020-11-09 NOTE — SIGNIFICANT NOTE
Patient reported that she has had thoughts of wishing she were dead in the past month due to not being able to do the things that she once did or loss of independence. No thoughts of killing herself or planning to do so.

## 2020-11-09 NOTE — ED PROVIDER NOTES
Pt presents to the ED complaining of nests and dizziness on and off for several days worse since yesterday.  Patient lives home alone and recently has been able to care for self and frequently has to have her daughter come over to assist her.  She states she did have a fall about 3 weeks ago and has had right-sided head pain since that time.  The daughter states the patient so weak that she cannot even get off the commode and she had to come help her get off the commode.  The patient saw her PCP about this last week and an MRI of the brain was ordered which has not been done yet.  The patient denies fevers, chills, cough or known COVID-19 exposure.    On exam, pt is alert and oriented x3 but chronically ill-appearing  Normocephalic atraumatic  No scleral icterus  Regular rate and rhythm  Clear to auscultation bilaterally  Abdomen is soft and nontender  The patient is alert with a nonfocal neuro exam    I agree with midlevel plan to check labs, urinalysis, EKG and head CT.    PPE  Pt does not present with symptoms for COVID19; however, I was wearing a mask and goggles throughout all patient interaction.    The patient's EKG shows what appears to be a new left bundle branch block.    We attempted to do orthostatics on the patient here however she is not able to stand.  Thus I feel the patient needs admission to the hospital for further evaluation and treatment of her weakness, dizziness, hypertension and left bundle branch block.      The GHASSAN and I have discussed this patient's history, physical exam, and treatment plan.  I have reviewed the documentation and personally had a face to face interaction with the patient. I affirm the documentation and agree with the treatment and plan.  The attached note describes my personal findings.           Chandler Umaña MD  11/09/20 [id number]

## 2020-11-09 NOTE — ED PROVIDER NOTES
EMERGENCY DEPARTMENT ENCOUNTER    Room Number:  03/03  Date of encounter:  11/9/2020  PCP: Babar Jones MD  Historian: Patient and daughter      PPE    Patient was placed in face mask in first look. Patient was wearing facemask when I entered the room and throughout our encounter. I wore full protective equipment throughout this patient encounter including a face mask, and gloves. Hand hygiene was performed before donning protective equipment and after removal when leaving the room.        HPI:  Chief Complaint: Dizziness  A complete HPI/ROS/PMH/PSH/SH/FH are unobtainable due to: Nothing    Context: Mary Boyle is a 74 y.o. female who arrives to the ED via EMS from home where she was by herself.  Patient presents with c/o mild to moderate, intermittent, dizziness that began yesterday.   Patient also complains of generalized weakness, nausea, diarrhea, dysuria, right-sided head pain.  Patient states that she did fall approximately 3 weeks ago, and states she has had pain to the right side of her head since then.  She complains of not being able to stand up by herself.  Her daughter states that she has had to call her multiple times in the last several days with just help getting up off the commode.  Patient denies fever, chills, chest pain, vomiting, neck pain or back pain.  Patient states that nothing makes the symptoms better and attempting to change positions worsens symptoms.          PAST MEDICAL HISTORY  Active Ambulatory Problems     Diagnosis Date Noted   • Chest pain 10/20/2016   • Colon polyp 02/15/2012   • COPD (chronic obstructive pulmonary disease) (CMS/[facility]) 02/15/2012   • Diverticulosis 02/15/2012   • Essential hypertension 03/24/2016   • GERD without esophagitis 03/24/2016   • Hypercholesterolemia 03/24/2016   • Osteopenia 10/20/2016   • Multiple thyroid nodules 10/20/2016   • Chronic fatigue 10/20/2016   • Prediabetes 10/29/2016   • Vitamin D deficiency 10/29/2016   • Renal  "insufficiency 10/29/2016   • Leg swelling 08/07/2018   • Hyperuricemia 03/20/2019   • SOB (shortness of breath) 08/20/2019     Resolved Ambulatory Problems     Diagnosis Date Noted   • Migraine 02/15/2012     Past Medical History:   Diagnosis Date   • Arthritis    • Asthma    • Depression    • Ganglion cyst    • GERD (gastroesophageal reflux disease)    • Gout    • Hiatal hernia    • Hypertension    • Hypokalemia    • Mild mitral regurgitation    • Thyroid nodule    • Tricuspid regurgitation          PAST SURGICAL HISTORY  Past Surgical History:   Procedure Laterality Date   • APPENDECTOMY     • BREAST LUMPECTOMY     • CHOLECYSTECTOMY     • GANGLION CYST EXCISION     • HERNIA REPAIR     • HYSTERECTOMY           FAMILY HISTORY  Family History   Problem Relation Age of Onset   • Heart attack Other    • Heart disease Other    • Hypertension Father    • Stroke Father          SOCIAL HISTORY  Social History     Socioeconomic History   • Marital status:      Spouse name: Not on file   • Number of children: Not on file   • Years of education: Not on file   • Highest education level: Not on file   Tobacco Use   • Smoking status: Former Smoker   • Smokeless tobacco: Never Used   • Tobacco comment: 13 years no smoking.    Substance and Sexual Activity   • Alcohol use: No     Comment: \"15 years sobriety\"   • Drug use: No   • Sexual activity: Never     Birth control/protection: Surgical         ALLERGIES  Iodinated diagnostic agents, Aspirin, Doxycycline, and Sertraline        REVIEW OF SYSTEMS  Review of Systems     All systems reviewed and negative except for those discussed in HPI.        PHYSICAL EXAM    ED Triage Vitals [11/09/20 1045]   Temp Heart Rate Resp BP SpO2   97.4 °F (36.3 °C) 74 16 171/97 95 %       Physical Exam  GENERAL: Chronically ill-appearing, non-toxic appearing, not distressed  HENT: normocephalic, atraumatic  EYES: no scleral icterus, PERRL  CV: regular rhythm, regular rate, no " murmur  RESPIRATORY: normal effort, CTAB  ABDOMEN: soft   MUSCULOSKELETAL: no deformity  NEURO: alert, moves all extremities, follows commands, mental status normal/baseline  Recent and remote memory functions are normal  Patient is attentive with normal concentration  Language is fluent  Speech is clear  Speech is non-dysarthric  Symmetric smile with no facial droop  Eyes close shut strongly bilaterally  Symmetric eyebrow raise bilaterally  EOMI, PERRL  CN II-XII grossly normal otherwise  5/5 strength to bilateral upper and lower extremities  No pronator drift  Intact FNF   Normal sensation to upper and lower extremities  SKIN: warm, dry, no rash   Psych: Appropriate mood and affect  Nursing notes and vital signs reviewed      LAB RESULTS  Recent Results (from the past 24 hour(s))   ECG 12 Lead    Collection Time: 11/09/20 12:06 PM   Result Value Ref Range    QT Interval 430 ms   Urinalysis With Microscopic If Indicated (No Culture) - Urine, Catheter In/Out    Collection Time: 11/09/20  1:02 PM    Specimen: Urine, Catheter In/Out   Result Value Ref Range    Color, UA Yellow Yellow, Straw    Appearance, UA Clear Clear    pH, UA 7.0 5.0 - 8.0    Specific Gravity, UA 1.017 1.005 - 1.030    Glucose, UA Negative Negative    Ketones, UA Trace (A) Negative    Bilirubin, UA Negative Negative    Blood, UA Negative Negative    Protein, UA Negative Negative    Leuk Esterase, UA Negative Negative    Nitrite, UA Negative Negative    Urobilinogen, UA 1.0 E.U./dL 0.2 - 1.0 E.U./dL   Comprehensive Metabolic Panel    Collection Time: 11/09/20  1:03 PM    Specimen: Blood   Result Value Ref Range    Glucose 87 65 - 99 mg/dL    BUN 20 8 - 23 mg/dL    Creatinine 1.16 (H) 0.57 - 1.00 mg/dL    Sodium 141 136 - 145 mmol/L    Potassium 3.8 3.5 - 5.2 mmol/L    Chloride 102 98 - 107 mmol/L    CO2 26.9 22.0 - 29.0 mmol/L    Calcium 9.3 8.6 - 10.5 mg/dL    Total Protein 6.5 6.0 - 8.5 g/dL    Albumin 4.10 3.50 - 5.20 g/dL    ALT (SGPT) 21 1 -  33 U/L    AST (SGOT) 37 (H) 1 - 32 U/L    Alkaline Phosphatase 59 39 - 117 U/L    Total Bilirubin 0.4 0.0 - 1.2 mg/dL    eGFR Non African Amer 46 (L) >60 mL/min/1.73    Globulin 2.4 gm/dL    A/G Ratio 1.7 g/dL    BUN/Creatinine Ratio 17.2 7.0 - 25.0    Anion Gap 12.1 5.0 - 15.0 mmol/L   Troponin    Collection Time: 11/09/20  1:03 PM    Specimen: Blood   Result Value Ref Range    Troponin T <0.010 0.000 - 0.030 ng/mL   Magnesium    Collection Time: 11/09/20  1:03 PM    Specimen: Blood   Result Value Ref Range    Magnesium 1.8 1.6 - 2.4 mg/dL   Light Blue Top    Collection Time: 11/09/20  1:03 PM   Result Value Ref Range    Extra Tube hold for add-on    Green Top (Gel)    Collection Time: 11/09/20  1:03 PM   Result Value Ref Range    Extra Tube Hold for add-ons.    Lavender Top    Collection Time: 11/09/20  1:03 PM   Result Value Ref Range    Extra Tube hold for add-on    Gold Top - SST    Collection Time: 11/09/20  1:03 PM   Result Value Ref Range    Extra Tube Hold for add-ons.    CBC Auto Differential    Collection Time: 11/09/20  1:03 PM    Specimen: Blood   Result Value Ref Range    WBC 7.22 3.40 - 10.80 10*3/mm3    RBC 4.31 3.77 - 5.28 10*6/mm3    Hemoglobin 12.4 12.0 - 15.9 g/dL    Hematocrit 37.5 34.0 - 46.6 %    MCV 87.0 79.0 - 97.0 fL    MCH 28.8 26.6 - 33.0 pg    MCHC 33.1 31.5 - 35.7 g/dL    RDW 13.4 12.3 - 15.4 %    RDW-SD 42.9 37.0 - 54.0 fl    MPV 11.1 6.0 - 12.0 fL    Platelets 224 140 - 450 10*3/mm3    Neutrophil % 78.2 (H) 42.7 - 76.0 %    Lymphocyte % 15.8 (L) 19.6 - 45.3 %    Monocyte % 4.7 (L) 5.0 - 12.0 %    Eosinophil % 0.6 0.3 - 6.2 %    Basophil % 0.3 0.0 - 1.5 %    Immature Grans % 0.4 0.0 - 0.5 %    Neutrophils, Absolute 5.65 1.70 - 7.00 10*3/mm3    Lymphocytes, Absolute 1.14 0.70 - 3.10 10*3/mm3    Monocytes, Absolute 0.34 0.10 - 0.90 10*3/mm3    Eosinophils, Absolute 0.04 0.00 - 0.40 10*3/mm3    Basophils, Absolute 0.02 0.00 - 0.20 10*3/mm3    Immature Grans, Absolute 0.03 0.00 - 0.05  10*3/mm3    nRBC 0.0 0.0 - 0.2 /100 WBC   ECG 12 Lead    Collection Time: 11/09/20  1:07 PM   Result Value Ref Range    QT Interval 418 ms       Ordered the above labs and independently reviewed the results.      RADIOLOGY  Xr Chest 1 View    Result Date: 11/9/2020  HISTORY: Weakness, dizziness and shortness of breath  COMPARISON: 07/01/2018  1 view(s) obtained.  FINDINGS: Lung fields are clear. Heart size stable. No evidence of pneumothorax. No acute osseous abnormality.      No acute findings  This report was finalized on 11/9/2020 1:45 PM by Dr. Peter Hale M.D.        I ordered the above noted radiological studies and viewed the images on the PACS system.       EKG      Independently viewed by me and interpreted by Dr Umaña         MEDICAL RECORD REVIEW  Medical records reviewed in [facility], patient had a stress test on September 11, 2019 which was normal, she also had an echo done at that time which showed an EF of 64% and no evidence evidence of a pericardial effusion.      PROCEDURES    Procedures        DIFFERENTIAL DIAGNOSIS  Differential diagnosis for dizziness include but are not limited to the following:  Vertigo, concussion, closed head injury, intracranial hemorrhage, CVA, cardiac arrhythmia, hypotension, headache, anemia, electrolyte abnormality        PROGRESS, DATA ANALYSIS, CONSULTS, AND MEDICAL DECISION MAKING        ED Course as of Nov 09 1559   Mon Nov 09, 2020   1434 Discussed pertinent information from history and physical exam with patient.  Discussed differential diagnosis and plan for ED evaluation/work-up and treatment including labs, EKG, CT head to rule out intracranial abnormality.  All questions answered.  Patient is agreeable with this plan.        [MS]   1452 Reviewed pt's history and workup with Dr. Umaña.  After a bedside evaluation, they agree with the plan of care.          [MS]   1532 I viewed the patient's chest imaging in PACS.  My interpretation is no infiltrate or bony  abnormality.  See dictation for official radiology interpretation.        [MS]   1537 ER tech attempted to do orthostatics, patient was unable to stand secondary to feeling dizzy and weak.  States that patient told him that she has not taken her blood pressure medicine today.  Blood pressure was 172/113 we will order her metoprolol dose p.o.   BP(!): 172/113 [MS]   1558 Consult Note    Discussed care with Dr Calderon  Reviewed patient's history, exam, results and need for admission secondary to generalized weakness, dizziness, hypertension  Dr. Calderon accepts the patient to be admitted to telemetry observation bed.        [MS]      ED Course User Index  [MS] Radha Griffith, JAMES       ADMISSION    Discussed treatment plan and reason for admission with pt/family and admitting physician.  Pt/family voiced understanding of the plan for admission for further testing/treatment as needed.      DIAGNOSIS  Final diagnoses:   Generalized weakness   Dizziness   Left bundle branch block   Essential hypertension         MEDICATIONS GIVEN IN ED    Medications   sodium chloride 0.9 % flush 10 mL (has no administration in time range)   sodium chloride 0.9 % flush 10 mL (has no administration in time range)   metoprolol tartrate (LOPRESSOR) tablet 25 mg (has no administration in time range)           COURSE & MEDICAL DECISION MAKING  Any/All labs and Any/All Imaging studies that were ordered were reviewed and are noted above.  Results were reviewed/discussed with the patient and they were also made aware of online assess.   Pt also made aware that some labs, such as cultures, will not be resulted during ER visit and follow up with PMD is necessary.        Radha Griffith APRN  11/09/20 0327

## 2020-11-10 ENCOUNTER — APPOINTMENT (OUTPATIENT)
Dept: MRI IMAGING | Facility: HOSPITAL | Age: 75
End: 2020-11-10

## 2020-11-10 LAB
ANION GAP SERPL CALCULATED.3IONS-SCNC: 6.4 MMOL/L (ref 5–15)
BUN SERPL-MCNC: 17 MG/DL (ref 8–23)
BUN/CREAT SERPL: 18.7 (ref 7–25)
CALCIUM SPEC-SCNC: 8.6 MG/DL (ref 8.6–10.5)
CHLORIDE SERPL-SCNC: 106 MMOL/L (ref 98–107)
CO2 SERPL-SCNC: 26.6 MMOL/L (ref 22–29)
CREAT SERPL-MCNC: 0.91 MG/DL (ref 0.57–1)
DEPRECATED RDW RBC AUTO: 42.6 FL (ref 37–54)
ERYTHROCYTE [DISTWIDTH] IN BLOOD BY AUTOMATED COUNT: 13.5 % (ref 12.3–15.4)
GFR SERPL CREATININE-BSD FRML MDRD: 60 ML/MIN/1.73
GLUCOSE SERPL-MCNC: 76 MG/DL (ref 65–99)
HCT VFR BLD AUTO: 34.1 % (ref 34–46.6)
HGB BLD-MCNC: 11.1 G/DL (ref 12–15.9)
MCH RBC QN AUTO: 28.4 PG (ref 26.6–33)
MCHC RBC AUTO-ENTMCNC: 32.6 G/DL (ref 31.5–35.7)
MCV RBC AUTO: 87.2 FL (ref 79–97)
PLATELET # BLD AUTO: 203 10*3/MM3 (ref 140–450)
PMV BLD AUTO: 10.6 FL (ref 6–12)
POTASSIUM SERPL-SCNC: 3.4 MMOL/L (ref 3.5–5.2)
RBC # BLD AUTO: 3.91 10*6/MM3 (ref 3.77–5.28)
SARS-COV-2 RNA RESP QL NAA+PROBE: NOT DETECTED
SODIUM SERPL-SCNC: 139 MMOL/L (ref 136–145)
WBC # BLD AUTO: 6 10*3/MM3 (ref 3.4–10.8)

## 2020-11-10 PROCEDURE — 70551 MRI BRAIN STEM W/O DYE: CPT

## 2020-11-10 PROCEDURE — 85027 COMPLETE CBC AUTOMATED: CPT | Performed by: HOSPITALIST

## 2020-11-10 PROCEDURE — 25010000002 LORAZEPAM PER 2 MG: Performed by: HOSPITALIST

## 2020-11-10 PROCEDURE — 80048 BASIC METABOLIC PNL TOTAL CA: CPT | Performed by: HOSPITALIST

## 2020-11-10 PROCEDURE — G0378 HOSPITAL OBSERVATION PER HR: HCPCS

## 2020-11-10 PROCEDURE — 72141 MRI NECK SPINE W/O DYE: CPT

## 2020-11-10 PROCEDURE — 99223 1ST HOSP IP/OBS HIGH 75: CPT | Performed by: PSYCHIATRY & NEUROLOGY

## 2020-11-10 PROCEDURE — 73721 MRI JNT OF LWR EXTRE W/O DYE: CPT

## 2020-11-10 PROCEDURE — 25810000003 SODIUM CHLORIDE 0.9 % WITH KCL 20 MEQ 20-0.9 MEQ/L-% SOLUTION: Performed by: HOSPITALIST

## 2020-11-10 RX ORDER — LORAZEPAM 2 MG/ML
0.5 INJECTION INTRAMUSCULAR ONCE
Status: COMPLETED | OUTPATIENT
Start: 2020-11-10 | End: 2020-11-10

## 2020-11-10 RX ORDER — ALBUTEROL SULFATE 2.5 MG/3ML
2.5 SOLUTION RESPIRATORY (INHALATION) EVERY 6 HOURS PRN
Status: DISCONTINUED | OUTPATIENT
Start: 2020-11-10 | End: 2020-11-14

## 2020-11-10 RX ORDER — AMLODIPINE BESYLATE 5 MG/1
5 TABLET ORAL
Status: DISCONTINUED | OUTPATIENT
Start: 2020-11-10 | End: 2020-11-11

## 2020-11-10 RX ORDER — PANTOPRAZOLE SODIUM 40 MG/1
40 TABLET, DELAYED RELEASE ORAL
Status: DISCONTINUED | OUTPATIENT
Start: 2020-11-11 | End: 2020-11-14 | Stop reason: HOSPADM

## 2020-11-10 RX ORDER — SODIUM CHLORIDE AND POTASSIUM CHLORIDE 150; 900 MG/100ML; MG/100ML
50 INJECTION, SOLUTION INTRAVENOUS CONTINUOUS
Status: DISCONTINUED | OUTPATIENT
Start: 2020-11-10 | End: 2020-11-12

## 2020-11-10 RX ORDER — LORAZEPAM 2 MG/ML
1 INJECTION INTRAMUSCULAR EVERY 4 HOURS PRN
Status: DISCONTINUED | OUTPATIENT
Start: 2020-11-10 | End: 2020-11-10

## 2020-11-10 RX ORDER — METOPROLOL SUCCINATE 25 MG/1
25 TABLET, EXTENDED RELEASE ORAL
Status: DISCONTINUED | OUTPATIENT
Start: 2020-11-11 | End: 2020-11-11

## 2020-11-10 RX ADMIN — SODIUM CHLORIDE, PRESERVATIVE FREE 10 ML: 5 INJECTION INTRAVENOUS at 08:51

## 2020-11-10 RX ADMIN — AMLODIPINE BESYLATE 5 MG: 5 TABLET ORAL at 14:49

## 2020-11-10 RX ADMIN — LORAZEPAM 0.5 MG: 2 INJECTION INTRAMUSCULAR; INTRAVENOUS at 12:40

## 2020-11-10 RX ADMIN — METOPROLOL SUCCINATE 50 MG: 50 TABLET, EXTENDED RELEASE ORAL at 08:51

## 2020-11-10 RX ADMIN — ACETAMINOPHEN 650 MG: 325 TABLET, FILM COATED ORAL at 21:17

## 2020-11-10 RX ADMIN — SODIUM CHLORIDE 75 ML/HR: 9 INJECTION, SOLUTION INTRAVENOUS at 01:23

## 2020-11-10 RX ADMIN — ALLOPURINOL 100 MG: 100 TABLET ORAL at 08:51

## 2020-11-10 RX ADMIN — POTASSIUM CHLORIDE AND SODIUM CHLORIDE 75 ML/HR: 900; 150 INJECTION, SOLUTION INTRAVENOUS at 12:40

## 2020-11-10 RX ADMIN — ACETAMINOPHEN 650 MG: 325 TABLET, FILM COATED ORAL at 12:40

## 2020-11-10 NOTE — PROGRESS NOTES
Continued Stay Note  [facility]     Patient Name: Mary Boyle  MRN: [id number]  Today's Date: 11/10/2020    Admit Date: 11/9/2020    Discharge Plan     Row Name 11/10/20 1552       Plan    Plan Comments  Spoke with daughter via room phone. She requested additional referrals to [facility], [facility], [facility], [facility], and [facility]. Referrals placed in Epic. Zuleika Pinto RN        Discharge Codes    No documentation.             Zuleika Pinto RN

## 2020-11-10 NOTE — PROGRESS NOTES
Discharge Planning Assessment  [facility]     Patient Name: Mary Boyle  MRN: [id number]  Today's Date: 11/10/2020    Admit Date: 11/9/2020    Discharge Needs Assessment     Row Name 11/10/20 1221       Living Environment    Lives With  alone    Current Living Arrangements  home/apartment/condo    Primary Care Provided by  self    Provides Primary Care For  no one    Family Caregiver if Needed  child(amanda), adult    Quality of Family Relationships  supportive;helpful       Resource/Environmental Concerns    Resource/Environmental Concerns  none    Transportation Concerns  car, none       Transition Planning    Patient/Family Anticipates Transition to  home with help/services;inpatient rehabilitation facility patient reports her dgt wants her to move into facility    Patient/Family Anticipated Services at Transition  ;skilled nursing;rehabilitation services;home health care    Transportation Anticipated  family or friend will provide       Discharge Needs Assessment    Equipment Currently Used at Home  walker, rolling    Concerns to be Addressed  grief and loss;home safety    Discharge Facility/Level of Care Needs  home with home health;nursing facility, skilled    Provided Post Acute Provider List?  Yes    Post Acute Provider List  Home Health;Inpatient Rehab    Delivered To  Patient    Method of Delivery  In person    Current Discharge Risk  physical impairment;lives alone        Discharge Plan     Row Name 11/10/20 1222       Plan    Plan  Undetermined; CCP following    Plan Comments  Met in room with patient.  Introduced self and explained role.  Facesheet verified.  Patient reports that she has fallen a few times this year already/ dtr is wanting her to sell her home and move into a facility- but patient does not want to do this.  CCP provided her a list of rehab places for STR- insurance precert would be needed.  Awaiting MRI today, IVF 75cc/hr.  CCP to follow-up with patient on choices for  rehab.        Continued Care and Services - Admitted Since 11/9/2020    Coordination has not been started for this encounter.         Demographic Summary     Row Name 11/10/20 1219       General Information    Admission Type  observation    Arrived From  home    Referral Source  admission list    Reason for Consult  discharge planning    Preferred Language  English     Used During This Interaction  no       Contact Information    Permission Granted to Share Info With  ;family/designee        Functional Status     Row Name 11/10/20 1220       Functional Status    Usual Activity Tolerance  good    Current Activity Tolerance  fair       Functional Status, IADL    Medications  independent    Meal Preparation  independent;assistive person    Housekeeping  independent;assistive person    Laundry  independent;assistive person    Shopping  independent;assistive person       Mental Status    General Appearance WDL  WDL       Mental Status Summary    Recent Changes in Mental Status/Cognitive Functioning  no changes        Psychosocial     Row Name 11/10/20 1220       Coping/Stress    Major Change/Loss/Stressor  death of a loved one                 Karen Reno RN

## 2020-11-10 NOTE — SIGNIFICANT NOTE
PT eval pending imaging to rule out stroke, cervical myelopathy, left hip fracture. Will follow plan of care and evaluate gait once medically cleared. Will attempt to see 11/11 if able.

## 2020-11-10 NOTE — CONSULTS
"Adult Nutrition  Assessment/PES    Patient Name:  Mary Boyle  YOB: 1945  MRN: [id number]  Admit Date:  11/9/2020    Assessment Date:  11/10/2020    Comments:  Consult: RN screen. MST 3 w/poor appetite. Generalized deconditioning since losing job d/t pandemic 6m ago. No recorded intake at present, on regular diet. Wt loss unclear per wt index. Skin intact. H/o personality disorder with anx/dep. Boost plus ordered BID with meals to assist in meeting needs. Will continue to monitor.    Due to the epidemic and in efforts to preserve PPE and reduce risk of exposure, nutrition assessment completed based on review of electronic medical record and/or discussion with (RN/MD).  Reason for Assessment     Row Name 11/10/20 1327          Reason for Assessment    Reason For Assessment  nurse/nurse practitioner consult     Diagnosis  neurologic conditions Dizziness/weakness w/nausea & diarrhea, HTN, dep/anx, personality disorder, GERD, asthma, hiatal hernia, gout, and MVR.     Identified At Risk by Screening Criteria  MST SCORE 2+;reduced oral intake over the last month         Nutrition/Diet History     Row Name 11/10/20 1328          Nutrition/Diet History    Typical Food/Fluid Intake  MST 3 w/poor appetite. No recorded intake at present, on regular diet. Wt loss unclear per wt index. Skin intact.     Typical Activity Patterns  sedentary     Factors Affecting Nutritional Intake  diarrhea;nausea         Anthropometrics     Row Name 11/10/20 1332          Anthropometrics    Height  147.3 cm (58\")        Ideal Body Weight (IBW)    Ideal Body Weight (IBW) (kg)  41.29         Labs/Tests/Procedures/Meds     Row Name 11/10/20 1331          Labs/Procedures/Meds    Lab Results Reviewed  reviewed     Lab Results Comments  K 3.4, GFR, Hgb        Diagnostic Tests/Procedures    Diagnostic Test/Procedure Reviewed  reviewed        Medications    Pertinent Medications Reviewed  reviewed     Pertinent Medications Comments  " "protonix, 75ml/hr IVF         Physical Findings     Row Name 11/10/20 1331          Physical Findings    Skin  other (see comments) Skin intact, B=19         Estimated/Assessed Needs     Row Name 11/10/20 1332          Calculation Measurements    Weight Used For Calculations  54.4 kg (119 lb 14.9 oz)     Height  147.3 cm (58\")        Estimated/Assessed Needs    Additional Documentation  KCAL/KG (Group);Protein Requirements (Group);Fluid Requirements (Group)        KCAL/KG    KCAL/KG  25 Kcal/Kg (kcal);30 Kcal/Kg (kcal)     25 Kcal/Kg (kcal)  1360     30 Kcal/Kg (kcal)  1632        Protein Requirements    Weight Used For Protein Calculations  54.4 kg (119 lb 14.9 oz)     Est Protein Requirement Amount (gms/kg)  1.3 gm protein     Estimated Protein Requirements (gms/day)  70.72        Fluid Requirements    Fluid Requirements (mL/day)  1650     Estimated Fluid Requirement Method  RDA Method     RDA Method (mL)  1650         Nutrition Prescription Ordered     Row Name 11/10/20 1333          Nutrition Prescription PO    Current PO Diet  Regular     Fluid Consistency  Thin         Problem/Interventions:  Problem 1     Row Name 11/10/20 1334          Nutrition Diagnoses Problem 1    Problem 1  Predicted Suboptimal Intake     Etiology (related to)  Factors Affecting Nutrition     Appetite  Poor at this Time     Reported GI Symptoms  Other;Diarrhea nausea w/o vomiting     Signs/Symptoms (evidenced by)  Report of Mnimal PO Intake           Intervention Goal     Row Name 11/10/20 8520          Intervention Goal    General  Maintain nutrition;Reduce/improve symptoms;Improved nutrition related lab(s);Meet nutritional needs for age/condition;Disease management/therapy     PO  Increase intake;Tolerate PO;Meet estimated needs     Weight  Maintain weight         Nutrition Intervention     Row Name 11/10/20 7449          Nutrition Intervention    RD/Tech Action  Care plan reviewd;Follow Tx progress;Encourage intake;Recommend/ordered  "    Recommended/Ordered  Supplement         Nutrition Prescription     Row Name 11/10/20 1334          Nutrition Prescription PO    PO Prescription  Begin/change supplement     Supplement  Boost Plus     Supplement Frequency  2 times a day     New PO Prescription Ordered?  Yes         Education/Evaluation     Row Name 11/10/20 1331          Education    Education  Will Instruct as appropriate        Monitor/Evaluation    Monitor  Per protocol;PO intake;Supplement intake;Pertinent labs;Weight;Symptoms     Education Follow-up  Reinforce PRN           Electronically signed by:  Charissa Masters MS,RD,LD  11/10/20 13:39 EST

## 2020-11-10 NOTE — NURSING NOTE
Spoke with [facility] about patient scoring low risk on suicide assessment. No need for suicide precautions. Will address with MD when returns call for admission orders.

## 2020-11-10 NOTE — CONSULTS
Neurology Consult Note    Consult Date: 11/10/2020    Referring MD: Austyn Calderon MD    Reason for Consult I have been asked to see the patient in neurological consultation to render advice and opinion regarding generalized weakness, dizziness, dementia    Mary Boyle is a 74 y.o. female with past medical narcissistic personality disorder, depression, hypertension, who presented to the hospital with a complaint of generalized weakness and inability to walk.  I obtained the history predominantly from her daughter who reports that the patient has a long history of psychiatric issues related to her personality disorder and somatoform complaints.  She stopped working as a [employer]  when the Covid pandemic started.  Since that time she has had progressively worsening generalized weakness and difficulty walking.  Her daughter initially attributed this to deconditioning.  The patient began complaining more more about having difficulty getting up and progressed from independent walking to using a cane to using a walker about 3 months ago.  About 2 weeks ago the patient had a fall and complained of left hip pain.  She received a left hip x-ray.  The radiologist recommended she obtain a left hip CT scan which has not been done.  Throughout the last month the patient has had worsening cognitive changes with disorientation and short-term memory impairment.  Patient was brought to the hospital yesterday because she was unable to get up off the toilet and her lower extremity weakness has continued to worsen.  Since admission there have been no changes.  She has complained of a right temporal headache which has resolved since she came to the hospital.    Past Medical/Surgical Hx:  Past Medical History:   Diagnosis Date   • Arthritis    • Asthma    • Chest pain    • Depression    • Ganglion cyst     right leg   • GERD (gastroesophageal reflux disease)    • Gout    • Hiatal hernia    • Hypertension    • Hypokalemia    •  "Mild mitral regurgitation    • Thyroid nodule    • Tricuspid regurgitation    • Vitamin D deficiency      Past Surgical History:   Procedure Laterality Date   • APPENDECTOMY     • BREAST LUMPECTOMY     • CHOLECYSTECTOMY     • GANGLION CYST EXCISION     • HERNIA REPAIR     • HYSTERECTOMY         Medications On Admission  Medications Prior to Admission   Medication Sig Dispense Refill Last Dose   • albuterol (VENTOLIN HFA) 108 (90 BASE) MCG/ACT inhaler 2 puffs Every 4 (Four) Hours As Needed.   11/8/2020 at Unknown time   • allopurinol (ZYLOPRIM) 100 MG tablet Take 1 tablet by mouth Daily. 90 tablet 3 11/8/2020 at Unknown time   • meclizine (ANTIVERT) 25 MG tablet Take 1 tablet by mouth 3 (Three) Times a Day As Needed for dizziness. 20 tablet 0  at Unknown time   • metoprolol succinate XL (TOPROL-XL) 25 MG 24 hr tablet 50 mg Daily.   11/9/2020 at Unknown time   • ipratropium-albuterol (DUO-NEB) 0.5-2.5 mg/mL nebulizer 2 sprays into each nostril daily 360 mL 0        Allergies:  Allergies   Allergen Reactions   • Iodinated Diagnostic Agents Shortness Of Breath     Asthma attack and hives   • Aspirin Other (See Comments)     WHEEZING  WHEEZING  WHEEZING   • Doxycycline Other (See Comments)   • Sertraline Diarrhea, Nausea And Vomiting and Other (See Comments)     CHEST PAIN   CHEST PAIN   CHEST PAIN        Social Hx:  Social History     Socioeconomic History   • Marital status:      Spouse name: Not on file   • Number of children: Not on file   • Years of education: Not on file   • Highest education level: Not on file   Tobacco Use   • Smoking status: Former Smoker   • Smokeless tobacco: Never Used   • Tobacco comment: 13 years no smoking.    Substance and Sexual Activity   • Alcohol use: No     Comment: \"15 years sobriety\"   • Drug use: No   • Sexual activity: Never     Birth control/protection: Surgical       Family Hx:  Family History   Problem Relation Age of Onset   • Heart attack Other    • Heart disease " "Other    • Hypertension Father    • Stroke Father        Review of systems  Constitutional: [No fevers, chills]  Eye: [+ Recent visual problems, no eye discharge]  Respiratory: [No shortness of breath, cough]  Cardiovascular: [No Chest pain, palpitations]  Neurologic: [+ Weakness, numbness]  Psychiatric: [No anxiety, + depression]    All other systems reviewed and are negative    Exam    BP (!) 183/94 (BP Location: Left arm, Patient Position: Lying)   Pulse 71   Temp 98 °F (36.7 °C) (Oral)   Resp 16   Ht 147.3 cm (58\")   Wt 54.4 kg (120 lb)   SpO2 97%   BMI 25.08 kg/m²   gen: NAD, vitals reviewed  Eyes: fundus sharp with no papilledema or retinal hemorrhages  HEENT: no nuchal rigidity  CVS: RRR, S1, S2  MS: oriented x3, recent/remote memory impaired, impaired attention/concentration, language intact, no neglect, normal fund of knowledge  CN: visual acuity grossly normal, visual fields full, PERRL, EOMI, facial sensation equal, no facial droop, hearing symmetric, palate elevates symmetrically, shoulder shrug equal, tongue midline  Motor: 5/5 throughout upper extremities, 4/5 left lower extremity, 4+/5 right lower extremity, increased tone bilateral lower extremities  Sensation: Diminished to vibration distal lower extremities  Reflexes: 2+ throughout upper and lower extremities, plantars mute  Coordination: no dysmetria with finger to nose bilaterally  Gait: Unable to stand due to paraparesis.    DATA:    Lab Results   Component Value Date    GLUCOSE 76 11/10/2020    CALCIUM 8.6 11/10/2020     11/10/2020    K 3.4 (L) 11/10/2020    CO2 26.6 11/10/2020     11/10/2020    BUN 17 11/10/2020    CREATININE 0.91 11/10/2020    EGFRIFAFRI 62 10/17/2019    EGFRIFNONA 60 (L) 11/10/2020    BCR 18.7 11/10/2020    ANIONGAP 6.4 11/10/2020     Lab Results   Component Value Date    WBC 6.00 11/10/2020    HGB 11.1 (L) 11/10/2020    HCT 34.1 11/10/2020    MCV 87.2 11/10/2020     11/10/2020     Lab Results "   Component Value Date     05/14/2020    LDL 91 10/17/2019    LDL 91 04/10/2019     Lab Results   Component Value Date    HGBA1C 5.5 11/05/2020     Lab Results   Component Value Date    INR 1.1 11/09/2015    PROTIME 13.5 11/09/2015       Lab review: GFR 60, hemoglobin 11.1    Imaging review: MRI brain, cervical spine, left hip ordered.    Diagnoses:  Memory loss  Paraparesis  Left hip pain    Comment: 74-year-old lady with history of personality disorder multiple medical risk factors presenting with progressive paraparesis over 6 months with about 1 month of increasing cognitive difficulty.  Associated with this there was a fall 2 weeks ago with reported hip pain and a radiologist had recommended further imaging for possible fracture. Extensive differential for her complaints. In particular I am concerned about stroke, cervical myelopathy, left hip fracture. This could all still be due to general deconditioning as well.    PLAN:  MRI brain, cervical spine, left hip    Will follow

## 2020-11-10 NOTE — PROGRESS NOTES
Continued Stay Note  [facility]     Patient Name: Mary Boyle  MRN: [id number]  Today's Date: 11/10/2020    Admit Date: 11/9/2020    Discharge Plan     Row Name 11/10/20 1247       Plan    Plan  SNF referrals pending; CCP following    Plan Comments  Met back in room with patient and obtained referrals (in order) [facility] and [facility].  [facility] referrals were made.                Karen Reno RN

## 2020-11-10 NOTE — DISCHARGE PLACEMENT REQUEST
"Mary Boyle (74 y.o. Female)     Date of Birth Social Security Number Address Home Phone MRN    1945  [id number] [facility] [address] [phone] [id number]    Jehovah's witness Marital Status          Tenriism        Admission Date Admission Type Admitting Provider Attending Provider Department, Room/Bed    11/9/20 Emergency Austyn Calderon MD Ahmed, Aftab, MD [address], P598/1    Discharge Date Discharge Disposition Discharge Destination                       Attending Provider: Austyn Calderon MD    Allergies: Iodinated Diagnostic Agents, Aspirin, Doxycycline, Sertraline    Isolation: None   Infection: COVID Screen (preop/placement) (11/09/20)   Code Status: CPR    Ht: 147.3 cm (58\")   Wt: 54.4 kg (120 lb)    Admission Cmt: None   Principal Problem: None                Active Insurance as of 11/9/2020     Primary Coverage     Payor Plan Insurance Group Employer/Plan Group    HUMANA MEDICARE REPLACEMENT HUMANA MEDICARE REPLACEMENT [id number]     Payor Plan Address Payor Plan Phone Number Payor Plan Fax Number Effective Dates    [address] [address] [phone]  1/1/2018 - None Entered    [facility] [id number]       Subscriber Name Subscriber Birth Date Member ID       MARY BOYLE 1945 [id number]                 Emergency Contacts      (Rel.) Home Phone Work Phone Mobile Phone    Sakina Hale (Daughter) [phone] -- --              "

## 2020-11-10 NOTE — H&P
"History and physical    Primary care physician  Dr. Jones    Chief complaint  Dizziness    History of present illness  74 white female with history of asthma osteoarthritis depression hypertension and gastroesophageal reflux disease presented to [facility] emergency room with intermittent dizziness started yesterday with generalized weakness nausea but no vomiting diarrhea.  Patient also have headache for last 3 weeks.  Patient denies any focal weakness.  Patient work-up in ER not conclusive admit for further management.  Patient also denies any fever chills chest pain increased shortness of breath but looks very anxious.  Patient does have a history of personality disorder.     PAST MEDICAL HISTORY  • Arthritis     • Asthma     • Depression     • Ganglion cyst     • GERD (gastroesophageal reflux disease)     • Gout     • Hiatal hernia     • Hypertension     • Hypokalemia     • Mild mitral regurgitation     • Thyroid nodule     • Tricuspid regurgitation        PAST SURGICAL HISTORY              Procedure Laterality Date   • APPENDECTOMY       • BREAST LUMPECTOMY       • CHOLECYSTECTOMY       • GANGLION CYST EXCISION       • HERNIA REPAIR       • HYSTERECTOMY            FAMILY HISTORY           Problem Relation Age of Onset   • Heart attack Other     • Heart disease Other     • Hypertension Father     • Stroke Father        SOCIAL HISTORY                 Socioeconomic History   • Marital status:        Spouse name: Not on file   • Number of children: Not on file   • Years of education: Not on file   • Highest education level: Not on file   Tobacco Use   • Smoking status: Former Smoker   • Smokeless tobacco: Never Used   • Tobacco comment: 13 years no smoking.    Substance and Sexual Activity   • Alcohol use: No       Comment: \"15 years sobriety\"   • Drug use: No   • Sexual activity: Never       Birth control/protection: Surgical        ALLERGIES  Iodinated diagnostic agents, Aspirin, Doxycycline, " "and Sertraline  Home medications reviewed     REVIEW OF SYSTEMS  All systems reviewed and negative except for those discussed in HPI.      PHYSICAL EXAM   Blood pressure 170/77, pulse 70, temperature 97.9 °F (36.6 °C), temperature source Oral, resp. rate 16, height 147.3 cm (58\"), weight 54.4 kg (120 lb), SpO2 93 %, not currently breastfeeding.    GENERAL: Chronically ill-appearing, non-toxic appearing, not distressed  HENT: normocephalic, atraumatic  EYES: no scleral icterus, PERRL  CV: regular rhythm, regular rate, no murmur  RESPIRATORY: normal effort, CTAB  ABDOMEN: soft nontender bowel sounds positive  MUSCULOSKELETAL: no deformity  NEURO: alert, moves all extremities, follows commands, mental status normal/baseline  SKIN: warm, dry, no rash   Psych: Appropriate mood and affect    LAB RESULTS  Lab Results (last 24 hours)     Procedure Component Value Units Date/Time    Basic Metabolic Panel [[id number]]  (Abnormal) Collected: 11/10/20 0543    Specimen: Blood from Arm, Left Updated: 11/10/20 0700     Glucose 76 mg/dL      BUN 17 mg/dL      Creatinine 0.91 mg/dL      Sodium 139 mmol/L      Potassium 3.4 mmol/L      Chloride 106 mmol/L      CO2 26.6 mmol/L      Calcium 8.6 mg/dL      eGFR Non African Amer 60 mL/min/1.73      BUN/Creatinine Ratio 18.7     Anion Gap 6.4 mmol/L     Narrative:      GFR Normal >60  Chronic Kidney Disease <60  Kidney Failure <15      CBC (No Diff) [[id number]]  (Abnormal) Collected: 11/10/20 0543    Specimen: Blood Updated: 11/10/20 0632     WBC 6.00 10*3/mm3      RBC 3.91 10*6/mm3      Hemoglobin 11.1 g/dL      Hematocrit 34.1 %      MCV 87.2 fL      MCH 28.4 pg      MCHC 32.6 g/dL      RDW 13.5 %      RDW-SD 42.6 fl      MPV 10.6 fL      Platelets 203 10*3/mm3     COVID PRE-OP / PRE-PROCEDURE SCREENING ORDER (NO ISOLATION) - Swab, Nasopharynx [[id number]] Collected: 11/09/20 1534    Specimen: Swab from Nasopharynx Updated: 11/09/20 1730    Narrative:      The following orders were " created for panel order COVID PRE-OP / PRE-PROCEDURE SCREENING ORDER (NO ISOLATION) - Swab, Nasopharynx.  Procedure                               Abnormality         Status                     ---------                               -----------         ------                     COVID-19,BIOTAP, NP/OP S...[[id number]]                      In process                   Please view results for these tests on the individual orders.    COVID-19,BIOTAP, NP/OP SWAB IN TRANSPORT MEDIA OR SALINE 24-36 HR TAT - Swab, Nasopharynx [[id number]] Collected: 11/09/20 1534    Specimen: Swab from Nasopharynx Updated: 11/09/20 1549    [place] Draw [[id number]] Collected: 11/09/20 1303    Specimen: Blood Updated: 11/09/20 1415    Narrative:      The following orders were created for panel order [place] Draw.  Procedure                               Abnormality         Status                     ---------                               -----------         ------                     Light Blue Top[[id number]]                                   Final result               Green Top (Gel)[[id number]]                                  Final result               Lavender Top[[id number]]                                     Final result               Gold Top - SST[[id number]]                                   Final result                 Please view results for these tests on the individual orders.    Light Blue Top [[id number]] Collected: 11/09/20 1303    Specimen: Blood Updated: 11/09/20 1415     Extra Tube hold for add-on     Comment: Auto resulted       Green Top (Gel) [[id number]] Collected: 11/09/20 1303    Specimen: Blood Updated: 11/09/20 1415     Extra Tube Hold for add-ons.     Comment: Auto resulted.       Lavender Top [[id number]] Collected: 11/09/20 1303    Specimen: Blood Updated: 11/09/20 1415     Extra Tube hold for add-on     Comment: Auto resulted       Gold Top - SST [[id number]] Collected: 11/09/20 1303    Specimen: Blood Updated:  11/09/20 1415     Extra Tube Hold for add-ons.     Comment: Auto resulted.       Comprehensive Metabolic Panel [[id number]]  (Abnormal) Collected: 11/09/20 1303    Specimen: Blood Updated: 11/09/20 1355     Glucose 87 mg/dL      BUN 20 mg/dL      Creatinine 1.16 mg/dL      Sodium 141 mmol/L      Potassium 3.8 mmol/L      Comment: Slight hemolysis detected by analyzer. Results may be affected.        Chloride 102 mmol/L      CO2 26.9 mmol/L      Calcium 9.3 mg/dL      Total Protein 6.5 g/dL      Albumin 4.10 g/dL      ALT (SGPT) 21 U/L      AST (SGOT) 37 U/L      Comment: Slight hemolysis detected by analyzer. Results may be affected.        Alkaline Phosphatase 59 U/L      Total Bilirubin 0.4 mg/dL      eGFR Non African Amer 46 mL/min/1.73      Globulin 2.4 gm/dL      A/G Ratio 1.7 g/dL      BUN/Creatinine Ratio 17.2     Anion Gap 12.1 mmol/L     Narrative:      GFR Normal >60  Chronic Kidney Disease <60  Kidney Failure <15      Troponin [[id number]]  (Normal) Collected: 11/09/20 1303    Specimen: Blood Updated: 11/09/20 1343     Troponin T <0.010 ng/mL     Narrative:      Troponin T Reference Range:  <= 0.03 ng/mL-   Negative for AMI  >0.03 ng/mL-     Abnormal for myocardial necrosis.  Clinicians would have to utilize clinical acumen, EKG, Troponin and serial changes to determine if it is an Acute Myocardial Infarction or myocardial injury due to an underlying chronic condition.       Results may be falsely decreased if patient taking Biotin.      Magnesium [[id number]]  (Normal) Collected: 11/09/20 1303    Specimen: Blood Updated: 11/09/20 1340     Magnesium 1.8 mg/dL     Urinalysis With Microscopic If Indicated (No Culture) - Urine, Catheter In/Out [[id number]]  (Abnormal) Collected: 11/09/20 1302    Specimen: Urine, Catheter In/Out Updated: 11/09/20 1318     Color, UA Yellow     Appearance, UA Clear     pH, UA 7.0     Specific Gravity, UA 1.017     Glucose, UA Negative     Ketones, UA Trace     Bilirubin, UA  Negative     Blood, UA Negative     Protein, UA Negative     Leuk Esterase, UA Negative     Nitrite, UA Negative     Urobilinogen, UA 1.0 E.U./dL    Narrative:      Urine microscopic not indicated.    CBC & Differential [[id number]]  (Abnormal) Collected: 11/09/20 1303    Specimen: Blood Updated: 11/09/20 1316    Narrative:      The following orders were created for panel order CBC & Differential.  Procedure                               Abnormality         Status                     ---------                               -----------         ------                     CBC Auto Differential[[id number]]        Abnormal            Final result                 Please view results for these tests on the individual orders.    CBC Auto Differential [[id number]]  (Abnormal) Collected: 11/09/20 1303    Specimen: Blood Updated: 11/09/20 1316     WBC 7.22 10*3/mm3      RBC 4.31 10*6/mm3      Hemoglobin 12.4 g/dL      Hematocrit 37.5 %      MCV 87.0 fL      MCH 28.8 pg      MCHC 33.1 g/dL      RDW 13.4 %      RDW-SD 42.9 fl      MPV 11.1 fL      Platelets 224 10*3/mm3      Neutrophil % 78.2 %      Lymphocyte % 15.8 %      Monocyte % 4.7 %      Eosinophil % 0.6 %      Basophil % 0.3 %      Immature Grans % 0.4 %      Neutrophils, Absolute 5.65 10*3/mm3      Lymphocytes, Absolute 1.14 10*3/mm3      Monocytes, Absolute 0.34 10*3/mm3      Eosinophils, Absolute 0.04 10*3/mm3      Basophils, Absolute 0.02 10*3/mm3      Immature Grans, Absolute 0.03 10*3/mm3      nRBC 0.0 /100 WBC         Imaging Results (Last 24 Hours)     Procedure Component Value Units Date/Time    CT Head Without Contrast [[id number]] Collected: 11/09/20 1606     Updated: 11/09/20 2030    Narrative:      CT OF THE BRAIN WITHOUT CONTRAST 11/09/2020     HISTORY: Dizziness. Weakness.     Axial images were obtained through the brain without intravenous  contrast. There is mild to moderate diffuse atrophy. There is decreased  attenuation of the periventricular white  matter bilaterally consistent  with moderately severe small vessel white matter ischemic disease.     There is no evidence of acute infarction, hemorrhage, midline shift or  mass effect.     No bony abnormalities are seen.       Impression:      1. Atrophy and small vessel white matter ischemic disease.  2. No acute intracranial process identified.     Radiation dose reduction techniques were utilized, including automated  exposure control and exposure modulation based on body size.     This report was finalized on 11/9/2020 8:27 PM by Dr. Kosta Ramírez M.D.       XR Chest 1 View [[id number]] Collected: 11/09/20 1342     Updated: 11/09/20 1348    Narrative:      HISTORY: Weakness, dizziness and shortness of breath     COMPARISON: 07/01/2018     1 view(s) obtained.      FINDINGS: Lung fields are clear. Heart size stable. No evidence of  pneumothorax. No acute osseous abnormality.       Impression:      No acute findings     This report was finalized on 11/9/2020 1:45 PM by Dr. Peter Hale M.D.              ECG 12 Lead                 HEART RATE= 88  bpm  RR Interval= 644  ms  NV Interval= 191  ms  P Horizontal Axis= 32  deg  P Front Axis= 44  deg  QRSD Interval= 151  ms  QT Interval= 418  ms  QRS Axis= 4  deg  T Wave Axis= 121  deg  - ABNORMAL ECG -  Sinus rhythm  Left bundle branch block  NO SIGNIFICANT CHANGE FROM PREVIOUS ECG              Current Facility-Administered Medications:   •  acetaminophen (TYLENOL) tablet 650 mg, 650 mg, Oral, Q4H PRN, Austyn Calderon MD, 650 mg at 11/09/20 2222  •  albuterol (PROVENTIL) nebulizer solution 0.083% 2.5 mg/3mL, 2.5 mg, Nebulization, Q6H PRN, Austyn Calderon MD  •  allopurinol (ZYLOPRIM) tablet 100 mg, 100 mg, Oral, Daily, Austyn Calderon MD, 100 mg at 11/10/20 0851  •  LORazepam (ATIVAN) injection 0.5 mg, 0.5 mg, Intravenous, Once, Austyn Calderon MD  •  metoprolol succinate XL (TOPROL-XL) 24 hr tablet 50 mg, 50 mg, Oral, Q24H, Austyn Calderon MD, 50 mg at 11/10/20 0851  •   [START ON 11/11/2020] pantoprazole (PROTONIX) EC tablet 40 mg, 40 mg, Oral, Q AM, Sraah Calderon MD  •  sodium chloride 0.9 % flush 10 mL, 10 mL, Intravenous, PRN, Chandler Umaña MD, 10 mL at 11/10/20 0851  •  sodium chloride 0.9 % flush 10 mL, 10 mL, Intravenous, PRN, Chandler Umaña MD, 10 mL at 11/09/20 2149  •  sodium chloride 0.9 % with KCl 20 mEq/L infusion, 75 mL/hr, Intravenous, Continuous, Sarah Calderon MD     ASSESSMENT  Dizziness with generalized weakness rule out TIA  Hypertension  Personality disorder  Anxiety disorder  Asthma  Gastroesophageal reflux disease    PLAN  Admit  IV fluid  Check MRI of the brain  Neuro consult  Psych to follow patient  Adjust home medications  Stress ulcer DVT prophylaxis  Supportive care  PT OT  Patient is full code  Discussed with nursing staff  Follow closely further recommendation current hospital course    SARAH CALDERON MD

## 2020-11-10 NOTE — PLAN OF CARE
Pt. Slightly hypertensive at times, VS otherwise wnl.  C/o headache at times, relieved by PO Tylenol.  Pt. Receiving IVFsk, K+ added d/t decreased K+.  Pt. To work with PT.  Pt. With purewick catheter, adequate UOP.  Pt. Had MRI of brain cervical spine, and hip today, see results.  Pt. Daughter concerned with safety at home, would like pt. To go to rehab.  Pt. Resting comfortably at present, will continue to monitor closely.      Problem: Adult Inpatient Plan of Care  Goal: Plan of Care Review  Outcome: Ongoing, Progressing  Flowsheets (Taken 11/10/2020 8432)  Progress: no change  Plan of Care Reviewed With: patient

## 2020-11-10 NOTE — PLAN OF CARE
Goal Outcome Evaluation:  Plan of Care Reviewed With: patient     Outcome Summary: Admit for generalized weakness. New orders received.

## 2020-11-11 ENCOUNTER — APPOINTMENT (OUTPATIENT)
Dept: GENERAL RADIOLOGY | Facility: HOSPITAL | Age: 75
End: 2020-11-11

## 2020-11-11 LAB
ALBUMIN SERPL-MCNC: 3.4 G/DL (ref 3.5–5.2)
ALBUMIN/GLOB SERPL: 1.6 G/DL
ALP SERPL-CCNC: 59 U/L (ref 39–117)
ALT SERPL W P-5'-P-CCNC: 16 U/L (ref 1–33)
ANION GAP SERPL CALCULATED.3IONS-SCNC: 8.5 MMOL/L (ref 5–15)
APPEARANCE CSF: CLEAR
AST SERPL-CCNC: 19 U/L (ref 1–32)
BASOPHILS # BLD AUTO: 0.03 10*3/MM3 (ref 0–0.2)
BASOPHILS NFR BLD AUTO: 0.5 % (ref 0–1.5)
BILIRUB SERPL-MCNC: 0.2 MG/DL (ref 0–1.2)
BUN SERPL-MCNC: 16 MG/DL (ref 8–23)
BUN/CREAT SERPL: 14.7 (ref 7–25)
CALCIUM SPEC-SCNC: 8.4 MG/DL (ref 8.6–10.5)
CHLORIDE SERPL-SCNC: 109 MMOL/L (ref 98–107)
CHOLEST SERPL-MCNC: 160 MG/DL (ref 0–200)
CO2 SERPL-SCNC: 23.5 MMOL/L (ref 22–29)
COLOR CSF: COLORLESS
CREAT SERPL-MCNC: 1.09 MG/DL (ref 0.57–1)
DEPRECATED RDW RBC AUTO: 42.1 FL (ref 37–54)
EOSINOPHIL # BLD AUTO: 0.26 10*3/MM3 (ref 0–0.4)
EOSINOPHIL NFR BLD AUTO: 4 % (ref 0.3–6.2)
ERYTHROCYTE [DISTWIDTH] IN BLOOD BY AUTOMATED COUNT: 13.4 % (ref 12.3–15.4)
GFR SERPL CREATININE-BSD FRML MDRD: 49 ML/MIN/1.73
GLOBULIN UR ELPH-MCNC: 2.1 GM/DL
GLUCOSE CSF-MCNC: 54 MG/DL (ref 40–70)
GLUCOSE SERPL-MCNC: 85 MG/DL (ref 65–99)
HBA1C MFR BLD: 5.48 % (ref 4.8–5.6)
HCT VFR BLD AUTO: 33.7 % (ref 34–46.6)
HDLC SERPL-MCNC: 65 MG/DL (ref 40–60)
HGB BLD-MCNC: 10.9 G/DL (ref 12–15.9)
IMM GRANULOCYTES # BLD AUTO: 0.02 10*3/MM3 (ref 0–0.05)
IMM GRANULOCYTES NFR BLD AUTO: 0.3 % (ref 0–0.5)
LDLC SERPL CALC-MCNC: 66 MG/DL (ref 0–100)
LDLC/HDLC SERPL: 0.91 {RATIO}
LYMPHOCYTES # BLD AUTO: 2.03 10*3/MM3 (ref 0.7–3.1)
LYMPHOCYTES NFR BLD AUTO: 31 % (ref 19.6–45.3)
MCH RBC QN AUTO: 28.2 PG (ref 26.6–33)
MCHC RBC AUTO-ENTMCNC: 32.3 G/DL (ref 31.5–35.7)
MCV RBC AUTO: 87.3 FL (ref 79–97)
METHOD: ABNORMAL
MONOCYTES # BLD AUTO: 0.4 10*3/MM3 (ref 0.1–0.9)
MONOCYTES NFR BLD AUTO: 6.1 % (ref 5–12)
NEUTROPHILS NFR BLD AUTO: 3.8 10*3/MM3 (ref 1.7–7)
NEUTROPHILS NFR BLD AUTO: 58.1 % (ref 42.7–76)
NRBC BLD AUTO-RTO: 0 /100 WBC (ref 0–0.2)
NT-PROBNP SERPL-MCNC: 1344 PG/ML (ref 0–1800)
NUC CELL # CSF MANUAL: 1 /MM3 (ref 0–5)
PLATELET # BLD AUTO: 202 10*3/MM3 (ref 140–450)
PMV BLD AUTO: 10.3 FL (ref 6–12)
POTASSIUM SERPL-SCNC: 3.8 MMOL/L (ref 3.5–5.2)
PROT CSF-MCNC: 29 MG/DL (ref 15–45)
PROT SERPL-MCNC: 5.5 G/DL (ref 6–8.5)
RBC # BLD AUTO: 3.86 10*6/MM3 (ref 3.77–5.28)
RBC # CSF MANUAL: 37 /MM3 (ref 0–0)
SODIUM SERPL-SCNC: 141 MMOL/L (ref 136–145)
TRIGL SERPL-MCNC: 178 MG/DL (ref 0–150)
TSH SERPL DL<=0.05 MIU/L-ACNC: 3.15 UIU/ML (ref 0.27–4.2)
TUBE # CSF: 1
VLDLC SERPL-MCNC: 29 MG/DL (ref 5–40)
WBC # BLD AUTO: 6.54 10*3/MM3 (ref 3.4–10.8)

## 2020-11-11 PROCEDURE — 83880 ASSAY OF NATRIURETIC PEPTIDE: CPT | Performed by: HOSPITALIST

## 2020-11-11 PROCEDURE — 84443 ASSAY THYROID STIM HORMONE: CPT | Performed by: HOSPITALIST

## 2020-11-11 PROCEDURE — 99233 SBSQ HOSP IP/OBS HIGH 50: CPT | Performed by: NURSE PRACTITIONER

## 2020-11-11 PROCEDURE — 97535 SELF CARE MNGMENT TRAINING: CPT

## 2020-11-11 PROCEDURE — 89050 BODY FLUID CELL COUNT: CPT | Performed by: NURSE PRACTITIONER

## 2020-11-11 PROCEDURE — 80061 LIPID PANEL: CPT | Performed by: HOSPITALIST

## 2020-11-11 PROCEDURE — 25010000003 LIDOCAINE 1 % SOLUTION: Performed by: RADIOLOGY

## 2020-11-11 PROCEDURE — 82945 GLUCOSE OTHER FLUID: CPT | Performed by: NURSE PRACTITIONER

## 2020-11-11 PROCEDURE — 97166 OT EVAL MOD COMPLEX 45 MIN: CPT

## 2020-11-11 PROCEDURE — 25810000003 SODIUM CHLORIDE 0.9 % WITH KCL 20 MEQ 20-0.9 MEQ/L-% SOLUTION: Performed by: HOSPITALIST

## 2020-11-11 PROCEDURE — 85025 COMPLETE CBC W/AUTO DIFF WBC: CPT | Performed by: HOSPITALIST

## 2020-11-11 PROCEDURE — 97530 THERAPEUTIC ACTIVITIES: CPT

## 2020-11-11 PROCEDURE — 84157 ASSAY OF PROTEIN OTHER: CPT | Performed by: NURSE PRACTITIONER

## 2020-11-11 PROCEDURE — 97162 PT EVAL MOD COMPLEX 30 MIN: CPT

## 2020-11-11 PROCEDURE — 80053 COMPREHEN METABOLIC PANEL: CPT | Performed by: HOSPITALIST

## 2020-11-11 PROCEDURE — 83036 HEMOGLOBIN GLYCOSYLATED A1C: CPT | Performed by: HOSPITALIST

## 2020-11-11 RX ORDER — METOPROLOL SUCCINATE 50 MG/1
50 TABLET, EXTENDED RELEASE ORAL
Status: DISCONTINUED | OUTPATIENT
Start: 2020-11-12 | End: 2020-11-14 | Stop reason: HOSPADM

## 2020-11-11 RX ORDER — HYDRALAZINE HYDROCHLORIDE 10 MG/1
10 TABLET, FILM COATED ORAL EVERY 8 HOURS SCHEDULED
Status: DISCONTINUED | OUTPATIENT
Start: 2020-11-11 | End: 2020-11-12

## 2020-11-11 RX ORDER — LIDOCAINE HYDROCHLORIDE 10 MG/ML
10 INJECTION, SOLUTION INFILTRATION; PERINEURAL ONCE
Status: COMPLETED | OUTPATIENT
Start: 2020-11-11 | End: 2020-11-11

## 2020-11-11 RX ORDER — AMLODIPINE BESYLATE 10 MG/1
10 TABLET ORAL
Status: DISCONTINUED | OUTPATIENT
Start: 2020-11-12 | End: 2020-11-14 | Stop reason: HOSPADM

## 2020-11-11 RX ADMIN — ALLOPURINOL 100 MG: 100 TABLET ORAL at 09:02

## 2020-11-11 RX ADMIN — LIDOCAINE HYDROCHLORIDE 2 ML: 10 INJECTION, SOLUTION INFILTRATION; PERINEURAL at 15:34

## 2020-11-11 RX ADMIN — HYDRALAZINE HYDROCHLORIDE 10 MG: 10 TABLET, FILM COATED ORAL at 21:20

## 2020-11-11 RX ADMIN — ACETAMINOPHEN 650 MG: 325 TABLET, FILM COATED ORAL at 16:32

## 2020-11-11 RX ADMIN — AMLODIPINE BESYLATE 5 MG: 5 TABLET ORAL at 09:02

## 2020-11-11 RX ADMIN — HYDRALAZINE HYDROCHLORIDE 10 MG: 10 TABLET, FILM COATED ORAL at 16:33

## 2020-11-11 RX ADMIN — PANTOPRAZOLE SODIUM 40 MG: 40 TABLET, DELAYED RELEASE ORAL at 06:33

## 2020-11-11 RX ADMIN — METOPROLOL SUCCINATE 25 MG: 25 TABLET, EXTENDED RELEASE ORAL at 09:02

## 2020-11-11 RX ADMIN — POTASSIUM CHLORIDE AND SODIUM CHLORIDE 75 ML/HR: 900; 150 INJECTION, SOLUTION INTRAVENOUS at 04:15

## 2020-11-11 RX ADMIN — ACETAMINOPHEN 650 MG: 325 TABLET, FILM COATED ORAL at 21:20

## 2020-11-11 NOTE — PLAN OF CARE
Problem: Adult Inpatient Plan of Care  Goal: Plan of Care Review  Recent Flowsheet Documentation  Taken 11/11/2020 5907 by Brittany Hollis OT  Progress: improving  Plan of Care Reviewed With: patient  Outcome Summary: Pt is a 74 year old female w/ h/o personality disorder, asthma ,osteoarthritis depression, hypertension, and GERD admitted w/ intermittent dizziness and generalized weakness. Pt lives alone and reports indep PTA. Pt presents to OT eval w/ significant generalized weakness, decreased balance, activity tolerance, and overall decreased indep/safety w/ ADLs and transfers. Pt able to complete STS transfer w/ min A and take a few steps forward and backward w/ min A using RW. Pt requires max/total A for LB dressing d/t ROM limitations. Pt completes grooming w/ s/u. Pt may benefit from skilled OT services to address deficits and maximize indep/safety w/ ADLs and transfers. Currently recommend discharge to SNF. Pt and OT wore standard mask during session and OT also wore gloves, glasses, and performed thorough hand hygiene before and after session.

## 2020-11-11 NOTE — THERAPY EVALUATION
Patient Name: Mary Boyle  : 1945    MRN: [id number]                              Today's Date: 2020       Admit Date: 2020    Visit Dx:     ICD-10-CM ICD-9-CM   1. Generalized weakness  R53.1 780.79   2. Dizziness  R42 780.4   3. Left bundle branch block  I44.7 426.3   4. Essential hypertension  I10 401.9     Patient Active Problem List   Diagnosis   • Chest pain   • Colon polyp   • COPD (chronic obstructive pulmonary disease) (CMS/HCC)   • Diverticulosis   • Essential hypertension   • GERD without esophagitis   • Hypercholesterolemia   • Osteopenia   • Multiple thyroid nodules   • Chronic fatigue   • Prediabetes   • Vitamin D deficiency   • Renal insufficiency   • Leg swelling   • Hyperuricemia   • SOB (shortness of breath)   • Generalized weakness     Past Medical History:   Diagnosis Date   • Arthritis    • Asthma    • Chest pain    • Depression    • Ganglion cyst     right leg   • GERD (gastroesophageal reflux disease)    • Gout    • Hiatal hernia    • Hypertension    • Hypokalemia    • Mild mitral regurgitation    • Thyroid nodule    • Tricuspid regurgitation    • Vitamin D deficiency      Past Surgical History:   Procedure Laterality Date   • APPENDECTOMY     • BREAST LUMPECTOMY     • CHOLECYSTECTOMY     • GANGLION CYST EXCISION     • HERNIA REPAIR     • HYSTERECTOMY       General Information     Row Name 20 1054          Physical Therapy Time and Intention    Document Type  evaluation  -AE     Mode of Treatment  individual therapy;physical therapy  -AE     Row Name 20 1054          General Information    Patient Profile Reviewed  yes  -AE     Prior Level of Function  independent:  -AE     Existing Precautions/Restrictions  fall  -AE     Row Name 20 1054          Living Environment    Lives With  alone  -AE     Row Name 20 1054          Home Main Entrance    Number of Stairs, Main Entrance  one  -AE     Row Name 20 1054          Stairs Within Home,  Primary    Number of Stairs, Within Home, Primary  none  -AE     Row Name 11/11/20 1054          Safety Issues, Functional Mobility    Safety Issues Affecting Function (Mobility)  awareness of need for assistance;positioning of assistive device;sequencing abilities  -AE     Impairments Affecting Function (Mobility)  balance;coordination;endurance/activity tolerance;pain;postural/trunk control;strength  -AE       User Key  (r) = Recorded By, (t) = Taken By, (c) = Cosigned By    Initials Name Provider Type    AE Purnima Bowden PT Physical Therapist        Mobility     Row Name 11/11/20 1055          Bed Mobility    Bed Mobility  bed mobility (all) activities  -AE     All Activities, [place] (Bed Mobility)  moderate assist (50% patient effort);1 person assist  -AE     Assistive Device (Bed Mobility)  bed rails;draw sheet;head of bed elevated  -AE     Row Name 11/11/20 1055          Transfers    Comment (Transfers)  Jayson all transfers with FWW; max cueing for sequencing  -AE     Row Name 11/11/20 1055          Bed-Chair Transfer    Bed-Chair Clay (Transfers)  minimum assist (75% patient effort);1 person assist  -AE     Assistive Device (Bed-Chair Transfers)  walker, front-wheeled  -AE     Row Name 11/11/20 1055          Sit-Stand Transfer    Sit-Stand [place] (Transfers)  minimum assist (75% patient effort);1 person assist  -AE     Assistive Device (Sit-Stand Transfers)  walker, front-wheeled  -AE     Row Name 11/11/20 1055          Gait/Stairs (Locomotion)    [place]ay Level (Gait)  minimum assist (75% patient effort);1 person assist  -AE     Assistive Device (Gait)  walker, front-wheeled  -AE     Distance in Feet (Gait)  2-3 ft from bed->chair; episodes of freezing, extremely slow to perform, max cueing for sequencing  -AE     Deviations/Abnormal Patterns (Gait)  antalgic;festinating/shuffling;base of support, narrow;jamari decreased;stride length decreased  -AE     Bilateral Gait Deviations   forward flexed posture;heel strike decreased  -AE     Left Sided Gait Deviations  weight shift ability decreased  -AE     Right Sided Gait Deviations  weight shift ability decreased  -AE       User Key  (r) = Recorded By, (t) = Taken By, (c) = Cosigned By    Initials Name Provider Type    AE Purnima Bowden PT Physical Therapist        Obj/Interventions     Row Name 11/11/20 1056          Range of Motion Comprehensive    Comment, General Range of Motion  BLE WFL  -AE     Row Name 11/11/20 1056          Strength Comprehensive (MMT)    Comment, General Manual Muscle Testing (MMT) Assessment  Generalized weakness  -AE       User Key  (r) = Recorded By, (t) = Taken By, (c) = Cosigned By    Initials Name Provider Type    AE Purnima Bowden PT Physical Therapist        Goals/Plan     Row Name 11/11/20 1057          Bed Mobility Goal 1 (PT)    Activity/Assistive Device (Bed Mobility Goal 1, PT)  bed mobility activities, all  -AE     [place] Level/Cues Needed (Bed Mobility Goal 1, PT)  minimum assist (75% or more patient effort);1 person assist  -AE     Time Frame (Bed Mobility Goal 1, PT)  2 weeks  -AE     Progress/Outcomes (Bed Mobility Goal 1, PT)  continuing progress toward goal  -AE     Row Name 11/11/20 1057          Transfer Goal 1 (PT)    Activity/Assistive Device (Transfer Goal 1, PT)  transfers, all  -AE     [place] Level/Cues Needed (Transfer Goal 1, PT)  contact guard assist;1 person assist  -AE     Time Frame (Transfer Goal 1, PT)  2 weeks  -AE     Progress/Outcome (Transfer Goal 1, PT)  continuing progress toward goal  -AE     Row Name 11/11/20 1057          Gait Training Goal 1 (PT)    Activity/Assistive Device (Gait Training Goal 1, PT)  gait (walking locomotion);assistive device use  -AE     [place] Level (Gait Training Goal 1, PT)  minimum assist (75% or more patient effort);1 person assist  -AE     Distance (Gait Training Goal 1, PT)  40ft  -AE     Time Frame (Gait Training Goal 1,  PT)  2 weeks  -AE     Progress/Outcome (Gait Training Goal 1, PT)  continuing progress toward goal  -AE       User Key  (r) = Recorded By, (t) = Taken By, (c) = Cosigned By    Initials Name Provider Type    Purnima Lofton PT Physical Therapist        Clinical Impression     Row Name 11/11/20 1056          Pain    Additional Documentation  Pain Scale: Numbers Pre/Post-Treatment (Group)  -AE     Row Name 11/11/20 1056          Pain Scale: Numbers Pre/Post-Treatment    Pretreatment Pain Rating  8/10  -AE     Posttreatment Pain Rating  8/10  -AE     Pain Location - Side  Bilateral  -AE     Pain Location - Orientation  proximal  -AE     Pain Location  hip  -AE     Pain Intervention(s)  Repositioned;Ambulation/increased activity;Rest  -AE     Row Name 11/11/20 1056          Plan of Care Review    Plan of Care Reviewed With  patient  -AE     Row Name 11/11/20 1056          Therapy Assessment/Plan (PT)    Patient/Family Therapy Goals Statement (PT)  Plans to DC to rehab  -AE     Rehab Potential (PT)  good, to achieve stated therapy goals  -AE     Criteria for Skilled Interventions Met (PT)  yes;meets criteria  -AE     Row Name 11/11/20 1056          Positioning and Restraints    Pre-Treatment Position  in bed  -AE     Post Treatment Position  chair  -AE     In Chair  reclined;call light within reach;encouraged to call for assist;with other staff  -AE       User Key  (r) = Recorded By, (t) = Taken By, (c) = Cosigned By    Initials Name Provider Type    Purnima Lofton PT Physical Therapist        Outcome Measures     Row Name 11/11/20 1058          How much help from another person do you currently need...    Turning from your back to your side while in flat bed without using bedrails?  2  -AE     Moving from lying on back to sitting on the side of a flat bed without bedrails?  2  -AE     Moving to and from a bed to a chair (including a wheelchair)?  3  -AE     Standing up from a chair using your arms (e.g.,  wheelchair, bedside chair)?  3  -AE     Climbing 3-5 steps with a railing?  1  -AE     To walk in hospital room?  2  -AE     AM-PAC 6 Clicks Score (PT)  13  -AE     Row Name 11/11/20 1058          Functional Assessment    Outcome Measure Options  AM-PAC 6 Clicks Basic Mobility (PT)  -AE       User Key  (r) = Recorded By, (t) = Taken By, (c) = Cosigned By    Initials Name Provider Type    AE Purnima Bowden PT Physical Therapist        Physical Therapy Education                 Title: PT OT SLP Therapies (Done)     Topic: Physical Therapy (Done)     Point: Mobility training (Done)     Learning Progress Summary           Patient Acceptance, E,TB, VU,NR by AE at 11/11/2020 1058                   Point: Home exercise program (Done)     Learning Progress Summary           Patient Acceptance, E,TB, VU,NR by AE at 11/11/2020 1058                   Point: Body mechanics (Done)     Learning Progress Summary           Patient Acceptance, E,TB, VU,NR by AE at 11/11/2020 1058                   Point: Precautions (Done)     Learning Progress Summary           Patient Acceptance, E,TB, VU,NR by AE at 11/11/2020 1058                               User Key     Initials Effective Dates Name Provider Type Discipline    AE 09/04/19 -  Purnima Bowden PT Physical Therapist PT              PT Recommendation and Plan  Planned Therapy Interventions (PT): balance training, bed mobility training, gait training, home exercise program, motor coordination training, neuromuscular re-education, patient/family education, postural re-education, ROM (range of motion), stair training, strengthening, stretching, transfer training  Plan of Care Reviewed With: patient     Time Calculation:   PT Charges     Row Name 11/11/20 1104             Time Calculation    Start Time  1000  -AE      Stop Time  1030  -AE      Time Calculation (min)  30 min  -AE      PT Received On  11/11/20  -AE      PT - Next Appointment  11/12/20  -AE      PT Goal Re-Cert  Due Date  11/25/20  -AE         Time Calculation- PT    Total Timed Code Minutes- PT  20 minute(s)  -AE        User Key  (r) = Recorded By, (t) = Taken By, (c) = Cosigned By    Initials Name Provider Type    AE Purnima Bowden, PT Physical Therapist        Therapy Charges for Today     Code Description Service Date Service Provider Modifiers Qty    [id number]  PT EVAL MOD COMPLEXITY 2 11/11/2020 Purnima Bowden, PT GP 1    [id number]  PT THERAPEUTIC ACT EA 15 MIN 11/11/2020 Purnima Bowden, PT GP 1          PT G-Codes  Outcome Measure Options: AM-PAC 6 Clicks Basic Mobility (PT)  AM-PAC 6 Clicks Score (PT): 13    Purnima Bowden PT  11/11/2020

## 2020-11-11 NOTE — CONSULTS
"IDENTIFYING INFORMATION: The patient is a 74-year-old white female admitted with generalized weakness.  She is seen related to a history of a \"personality disorder\".    CHIEF COMPLAINT: None given    INFORMANT: Patient and chart    RELIABILITY: Fair    HISTORY OF PRESENT ILLNESS: The patient is a 74-year-old white female admitted on 11/9/2020 with complaints of weaknedd and intermittent dizziness.  She is seen by psychiatry related to a history of treatment for a \"personality disorder\".  The patient reports that she had previously been followed by Dr. Mukesh Wu, but is currently not under the care of a psychiatrist or other mental health professional.  She denies current suicidal or homicidal ideation; she denies any psychotic symptoms.  She has not complained of depression or anxiety during today's interview.  The chart indicates that the patient has been diagnosed with \"narcissistic personality disorder\" and also has a history of some somatization.  She is currently on no psychotropic medications and does not wish to begin any.    PAST PSYCHIATRIC HISTORY: As above    PAST MEDICAL HISTORY: Significant for arthritis, asthma, ganglion cyst, GERD, gout, hiatal hernia, hypertension, hypokalemia, mitral regurgitation, tricuspid regurgitation    MEDICATIONS:   Current Facility-Administered Medications   Medication Dose Route Frequency Provider Last Rate Last Dose   • acetaminophen (TYLENOL) tablet 650 mg  650 mg Oral Q4H PRN Austyn Calderon MD   650 mg at 11/10/20 2117   • albuterol (PROVENTIL) nebulizer solution 0.083% 2.5 mg/3mL  2.5 mg Nebulization Q6H PRN Austyn Calderon MD       • allopurinol (ZYLOPRIM) tablet 100 mg  100 mg Oral Daily Austyn Calderon MD   100 mg at 11/11/20 0902   • amLODIPine (NORVASC) tablet 5 mg  5 mg Oral Q24H Austyn Calderon MD   5 mg at 11/11/20 0902   • metoprolol succinate XL (TOPROL-XL) 24 hr tablet 25 mg  25 mg Oral Q24H Austyn Calderon MD   25 mg at 11/11/20 0902   • pantoprazole (PROTONIX) EC " tablet 40 mg  40 mg Oral Q AM Austyn Calderon MD   40 mg at 11/11/20 0633   • sodium chloride 0.9 % flush 10 mL  10 mL Intravenous PRN Chandler Umaña MD   10 mL at 11/10/20 0851   • sodium chloride 0.9 % flush 10 mL  10 mL Intravenous PRN Chandler Umaña MD   10 mL at 11/09/20 2149   • sodium chloride 0.9 % with KCl 20 mEq/L infusion  75 mL/hr Intravenous Continuous Austyn Calderon MD 75 mL/hr at 11/11/20 0636 75 mL/hr at 11/11/20 0636         ALLERGIES: Iodine, aspirin, doxycycline, Zoloft    FAMILY HISTORY: Noncontributory    SOCIAL HISTORY: Patient lives alone.  She denies use of alcohol tobacco or street drugs    MENTAL STATUS EXAM: The patient is an elderly white female appearing her stated age.  She is no apparent physical distress at the time of examination.  She is awake alert and oriented in all spheres.  Her mood is euthymic her affect congruent.  Speech is relevant and coherent.  There are no deficits memory cognition noted.  Intelligence is judged to be in the average range based on fund of knowledge, the patient is cooperative throughout the interview.  She denies suicidal homicidal ideation or psychotic features.  Her judgment insight appear to be reasonably intact.    ASSETS/LIABILITIES: To be assessed    DIAGNOSTIC IMPRESSION: Dysthymic disorder, narcissistic personality disorder by history, multiple medical problems described previously    PLAN: The patient does not express interest in initiation of any antidepressant medication and would probably not benefit from such medication as personality disorders are not amenable to pharmacologic treatment.  She has little motivation with regards to post discharge treatment, but could be offered referral for outpatient psychotherapy when she is discharged.  Otherwise, there is little to add.    Thank you for the opportunity to see this patient.

## 2020-11-11 NOTE — THERAPY EVALUATION
Patient Name: Mary Boyle  : 1945    MRN: [id number]                              Today's Date: 2020       Admit Date: 2020    Visit Dx:     ICD-10-CM ICD-9-CM   1. Generalized weakness  R53.1 780.79   2. Dizziness  R42 780.4   3. Left bundle branch block  I44.7 426.3   4. Essential hypertension  I10 401.9     Patient Active Problem List   Diagnosis   • Chest pain   • Colon polyp   • COPD (chronic obstructive pulmonary disease) (CMS/HCC)   • Diverticulosis   • Essential hypertension   • GERD without esophagitis   • Hypercholesterolemia   • Osteopenia   • Multiple thyroid nodules   • Chronic fatigue   • Prediabetes   • Vitamin D deficiency   • Renal insufficiency   • Leg swelling   • Hyperuricemia   • SOB (shortness of breath)   • Generalized weakness     Past Medical History:   Diagnosis Date   • Arthritis    • Asthma    • Chest pain    • Depression    • Ganglion cyst     right leg   • GERD (gastroesophageal reflux disease)    • Gout    • Hiatal hernia    • Hypertension    • Hypokalemia    • Mild mitral regurgitation    • Thyroid nodule    • Tricuspid regurgitation    • Vitamin D deficiency      Past Surgical History:   Procedure Laterality Date   • APPENDECTOMY     • BREAST LUMPECTOMY     • CHOLECYSTECTOMY     • GANGLION CYST EXCISION     • HERNIA REPAIR     • HYSTERECTOMY       General Information     Row Name 20 1449          OT Time and Intention    Document Type  evaluation  -RD     Mode of Treatment  occupational therapy  -RD     Row Name 20 1449          General Information    Patient Profile Reviewed  yes  -RD     Prior Level of Function  independent:;ADL's  -RD     Existing Precautions/Restrictions  fall  -RD     Row Name 20 1449          Occupational Profile    Reason for Services/Referral (Occupational Profile)  decreased balance, endurance, and indep/safety w/ ADLs and transfers  -RD     Row Name 20 1449          Living Environment    Lives With  alone   -     Row Name 11/11/20 1449          Cognition    Orientation Status (Cognition)  oriented x 3  -RD     Row Name 11/11/20 1449          Safety Issues, Functional Mobility    Impairments Affecting Function (Mobility)  balance;endurance/activity tolerance;strength  -RD       User Key  (r) = Recorded By, (t) = Taken By, (c) = Cosigned By    Initials Name Provider Type    Brittany Tinajero OT Occupational Therapist        Mobility/ADL's     Row Name 11/11/20 1449          Bed Mobility    Comment (Bed Mobility)  NT- up in chair  -     Row Name 11/11/20 1449          Transfers    Transfers  sit-stand transfer  -     Sit-Stand [place] (Transfers)  minimum assist (75% patient effort);verbal cues  -     Row Name 11/11/20 1449          Sit-Stand Transfer    Assistive Device (Sit-Stand Transfers)  walker, front-wheeled  -     Row Name 11/11/20 [facility]9          Functional Mobility    Functional Mobility- Ind. Level  minimum assist (75% patient effort);verbal cues required  -RD     Functional Mobility- Device  rolling walker  -RD     Functional Mobility- Comment  pt able to take a couple steps forward and backward using RW w/ min A  -RD     Row Name 11/11/20 1449          Activities of Daily Living    BADL Assessment/Intervention  lower body dressing;grooming  -RD     Row Name 11/11/20 1449          Lower Body Dressing Assessment/Training    [place] Level (Lower Body Dressing)  lower body dressing skills;doff;don;socks;maximum assist (25% patient effort);dependent (less than 25% patient effort);verbal cues;nonverbal cues (demo/gesture)  -RD     Position (Lower Body Dressing)  supported sitting  -RD     Row Name 11/11/20 [facility]9          Grooming Assessment/Training    [place] Level (Grooming)  grooming skills;set up  -RD     Position (Grooming)  supported sitting  -RD       User Key  (r) = Recorded By, (t) = Taken By, (c) = Cosigned By    Initials Name Provider Type    Brittany Tinajero, OT  Occupational Therapist        Obj/Interventions     Row Name 11/11/20 1450          Sensory Assessment (Somatosensory)    Sensory Assessment (Somatosensory)  UE sensation intact  -RD     Row Name 11/11/20 1450          Range of Motion Comprehensive    General Range of Motion  bilateral upper extremity ROM WFL  -RD     Row Name 11/11/20 1450          Strength Comprehensive (MMT)    Comment, General Manual Muscle Testing (MMT) Assessment  B UE MMT: grossly approx. 3/5; generalized weakness noted  -RD     Row Name 11/11/20 1450          Balance    Balance Assessment  sitting static balance;standing static balance  -RD     Static Sitting Balance  WFL  -RD     Static Standing Balance  mild impairment min A for static standing balance w/ RW  -RD       User Key  (r) = Recorded By, (t) = Taken By, (c) = Cosigned By    Initials Name Provider Type    Brittany Tinajero, CASSIE Occupational Therapist        Goals/Plan     Row Name 11/11/20 1451          Transfer Goal 1 (OT)    Activity/Assistive Device (Transfer Goal 1, OT)  toilet  -RD     [place] Level/Cues Needed (Transfer Goal 1, OT)  standby assist;contact guard assist  -RD     Time Frame (Transfer Goal 1, OT)  long term goal (LTG)  -RD     Progress/Outcome (Transfer Goal 1, OT)  goal ongoing  -     Row Name 11/11/20 1451          Dressing Goal 1 (OT)    Activity/Device (Dressing Goal 1, OT)  lower body dressing  -RD     [place]/Cues Needed (Dressing Goal 1, OT)  minimum assist (75% or more patient effort)  -RD     Time Frame (Dressing Goal 1, OT)  long term goal (LTG)  -RD     Progress/Outcome (Dressing Goal 1, OT)  goal ongoing  -RD     Row Name 11/11/20 1451          Strength Goal 1 (OT)    Strength Goal 1 (OT)  Pt will increase B UE strength to approx. 3+/5 to assist w/ ADLs and transfers  -RD     Time Frame (Strength Goal 1, OT)  long term goal (LTG)  -RD     Progress/Outcome (Strength Goal 1, OT)  goal ongoing  -     Row Name 11/11/20 1451           Therapy Assessment/Plan (OT)    Planned Therapy Interventions (OT)  activity tolerance training;BADL retraining;functional balance retraining;patient/caregiver education/training;ROM/therapeutic exercise;strengthening exercise;transfer/mobility retraining  -RD       User Key  (r) = Recorded By, (t) = Taken By, (c) = Cosigned By    Initials Name Provider Type    RD Brittany Hollis, OT Occupational Therapist        Clinical Impression     Row Name 11/11/20 7053          Pain Scale: Numbers Pre/Post-Treatment    Pretreatment Pain Rating  0/10 - no pain  -RD     Posttreatment Pain Rating  0/10 - no pain  -RD     Row Name 11/11/20 1459          Plan of Care Review    Plan of Care Reviewed With  patient  -RD     Progress  improving  -RD     Outcome Summary  Pt is a 74 year old female w/ h/o personality disorder, asthma ,osteoarthritis depression, hypertension, and GERD admitted w/ intermittent dizziness and generalized weakness. Pt lives alone and reports indep PTA. Pt presents to OT eval w/ significant generalized weakness, decreased balance, activity tolerance, and overall decreased indep/safety w/ ADLs and transfers. Pt able to complete STS transfer w/ min A and take a few steps forward and backward w/ min A using RW. Pt requires max/total A for LB dressing d/t ROM limitations. Pt completes grooming w/ s/u. Pt may benefit from skilled OT services to address deficits and maximize indep/safety w/ ADLs and transfers. Currently recommend discharge to SNF. Pt and OT wore standard mask during session and OT also wore gloves, glasses, and performed thorough hand hygiene before and after session.  -RD     Row Name 11/11/20 5525          Therapy Assessment/Plan (OT)    Rehab Potential (OT)  good, to achieve stated therapy goals  -RD     Criteria for Skilled Therapeutic Interventions Met (OT)  yes;skilled treatment is necessary  -RD     Therapy Frequency (OT)  5 times/wk  -RD     Row Name 11/11/20 3642          Therapy Plan  Review/Discharge Plan (OT)    Anticipated Discharge Disposition (OT)  skilled nursing facility  -     Row Name 11/11/20 1453          Vital Signs    O2 Delivery Pre Treatment  room air  -     Row Name 11/11/20 1453          Positioning and Restraints    Pre-Treatment Position  sitting in chair/recliner  -RD     Post Treatment Position  chair  -RD     In Chair  sitting;call light within reach;encouraged to call for assist;exit alarm on;notified nsg  -RD       User Key  (r) = Recorded By, (t) = Taken By, (c) = Cosigned By    Initials Name Provider Type    Brittany Tinajero, CASSIE Occupational Therapist        Outcome Measures     Row Name 11/11/20 1452          How much help from another is currently needed...    Putting on and taking off regular lower body clothing?  2  -RD     Bathing (including washing, rinsing, and drying)  2  -RD     Toileting (which includes using toilet bed pan or urinal)  2  -RD     Putting on and taking off regular upper body clothing  3  -RD     Taking care of personal grooming (such as brushing teeth)  3  -RD     Eating meals  3  -RD     AM-PAC 6 Clicks Score (OT)  15  -RD     Row Name 11/11/20 1452          Modified [place] Scale    Modified [place] Scale  4 - Moderately severe disability.  Unable to walk without assistance, and unable to attend to own bodily needs without assistance.  -RD     Row Name 11/11/20 1452          Functional Assessment    Outcome Measure Options  AM-PAC 6 Clicks Daily Activity (OT);Modified [place]  -RD       User Key  (r) = Recorded By, (t) = Taken By, (c) = Cosigned By    Initials Name Provider Type    Brittany Tinajero OT Occupational Therapist        Occupational Therapy Education                 Title: PT OT SLP Therapies (In Progress)     Topic: Occupational Therapy (In Progress)     Point: ADL training (Done)     Description:   Instruct learner(s) on proper safety adaptation and remediation techniques during self care or transfers.   Instruct in  proper use of assistive devices.              Learning Progress Summary           Patient Acceptance, E, VU by [facility] at 11/11/2020 7825    Comment: OT educ on OT role in therapeutic process and pt's POC.                   Point: Home exercise program (Not Started)     Description:   Instruct learner(s) on appropriate technique for monitoring, assisting and/or progressing therapeutic exercises/activities.              Learner Progress:  Not documented in this visit.          Point: Precautions (Not Started)     Description:   Instruct learner(s) on prescribed precautions during self-care and functional transfers.              Learner Progress:  Not documented in this visit.          Point: Body mechanics (Not Started)     Description:   Instruct learner(s) on proper positioning and spine alignment during self-care, functional mobility activities and/or exercises.              Learner Progress:  Not documented in this visit.                      User Key     Initials Effective Dates Name Provider Type Discipline    [facility] 10/14/19 -  Brittany Hollis, CASSIE Occupational Therapist OT              OT Recommendation and Plan  Planned Therapy Interventions (OT): activity tolerance training, BADL retraining, functional balance retraining, patient/caregiver education/training, ROM/therapeutic exercise, strengthening exercise, transfer/mobility retraining  Therapy Frequency (OT): 5 times/wk  Plan of Care Review  Plan of Care Reviewed With: patient  Progress: improving  Outcome Summary: Pt is a 74 year old female w/ h/o personality disorder, asthma ,osteoarthritis depression, hypertension, and GERD admitted w/ intermittent dizziness and generalized weakness. Pt lives alone and reports indep PTA. Pt presents to OT eval w/ significant generalized weakness, decreased balance, activity tolerance, and overall decreased indep/safety w/ ADLs and transfers. Pt able to complete STS transfer w/ min A and take a few steps forward and backward  w/ min A using RW. Pt requires max/total A for LB dressing d/t ROM limitations. Pt completes grooming w/ s/u. Pt may benefit from skilled OT services to address deficits and maximize indep/safety w/ ADLs and transfers. Currently recommend discharge to SNF. Pt and OT wore standard mask during session and OT also wore gloves, glasses, and performed thorough hand hygiene before and after session.     Time Calculation:   Time Calculation- OT     Row Name 11/11/20 1456             Time Calculation- OT    OT Start Time  1018  -RD      OT Stop Time  1039  -RD      OT Time Calculation (min)  21 min  -RD      Total Timed Code Minutes- OT  14 minute(s)  -RD      OT Received On  11/11/20  -RD      OT - Next Appointment  11/12/20  -RD      OT Goal Re-Cert Due Date  11/25/20  -RD        User Key  (r) = Recorded By, (t) = Taken By, (c) = Cosigned By    Initials Name Provider Type    RD Brittany Hollis OT Occupational Therapist        Therapy Charges for Today     Code Description Service Date Service Provider Modifiers Qty    [id number]  OT EVAL MOD COMPLEXITY 2 11/11/2020 Brittany Hollis OT GO 1    [id number] HC OT SELF CARE/MGMT/TRAIN EA 15 MIN 11/11/2020 Brittany Hollis OT GO 1               Brittany Hollis OT  11/11/2020

## 2020-11-11 NOTE — PROGRESS NOTES
Continued Stay Note  [facility]     Patient Name: Mary Boyle  MRN: [id number]  Today's Date: 11/11/2020    Admit Date: 11/9/2020    Discharge Plan     Row Name 11/11/20 1300       Plan    Plan  [facility], pre-cert will be started tomorrow.    Patient/Family in Agreement with Plan  yes    Plan Comments  Inbound call form daughter Sakina. [facility] is 1st choice. Cert will be started tomorrow per Chanell. Partial packet in [facility] office. Zuleika Pinto RN    Row Name 11/11/20 1254       Plan    Plan Comments  Spoke with Chanell/S[facility]. [facility] accept clinical. Patient will need a covid test 48 hours prior to DC. Left VM with daughter Sakina regarding facility. Awaiting call back. Needs Pre-cert. Zuleika Pinto RN        Discharge Codes    No documentation.             Zuleika Pinto RN

## 2020-11-11 NOTE — PLAN OF CARE
Pt is a 74 white female with PMHX personality disorder, asthma ,osteoarthritis depression, hypertension, and gastroesophageal reflux disease presented to [facility] emergency room with intermittent dizziness started yesterday with generalized weakness. Pt lives alone in [facility] with no steps to enter, uses FWW for household mobility, reports multiple falls over past few months. Pt required modA for bed mobility, Jayson for transfers, only able to ambulate 2-3ft with FWW needing max cueing for sequencing and navigation of FWW during turns, multiple episodes of freezing. Skilled PT needed to address above impairments. DC recs include SNU/[facility].      Problem: Adult Inpatient Plan of Care  Goal: Plan of Care Review  Outcome: Ongoing, Progressing  Flowsheets (Taken 11/11/2020 1056)  Plan of Care Reviewed With: patient

## 2020-11-11 NOTE — PROGRESS NOTES
"Daily progress note    Chief complaint  Doing little better  Still complaining of weakness  No more dizziness lightheadedness  Family at bedside    History of present illness  74 white female with history of asthma osteoarthritis depression hypertension and gastroesophageal reflux disease presented to [facility] emergency room with intermittent dizziness started yesterday with generalized weakness nausea but no vomiting diarrhea.  Patient also have headache for last 3 weeks.  Patient denies any focal weakness.  Patient work-up in ER not conclusive admit for further management.  Patient also denies any fever chills chest pain increased shortness of breath but looks very anxious.  Patient does have a history of personality disorder.      REVIEW OF SYSTEMS  All systems reviewed and negative except for those discussed in HPI.      PHYSICAL EXAM   Blood pressure (!) 199/98, pulse 93, temperature 98.5 °F (36.9 °C), temperature source Oral, resp. rate 18, height 147.3 cm (58\"), weight 54.4 kg (120 lb), SpO2 96 %, not currently breastfeeding.    GENERAL: Chronically ill-appearing, non-toxic appearing, not distressed  HENT: normocephalic, atraumatic  EYES: no scleral icterus, PERRL  CV: regular rhythm, regular rate, no murmur  RESPIRATORY: normal effort, CTAB  ABDOMEN: soft nontender bowel sounds positive  MUSCULOSKELETAL: no deformity  NEURO: alert, moves all extremities, follows commands, mental status normal/baseline  SKIN: warm, dry, no rash   Psych: Appropriate mood and affect    LAB RESULTS  Lab Results (last 24 hours)     Procedure Component Value Units Date/Time    BNP [[id number]]  (Normal) Collected: 11/11/20 0536    Specimen: Blood from Arm, Left Updated: 11/11/20 0705     proBNP 1,344.0 pg/mL     Narrative:      Among patients with dyspnea, NT-proBNP is highly sensitive for the detection of acute congestive heart failure. In addition NT-proBNP of <300 pg/ml effectively rules out acute congestive heart " failure with 99% negative predictive value.    Results may be falsely decreased if patient taking Biotin.      TSH [[id number]]  (Normal) Collected: 11/11/20 0536    Specimen: Blood from Arm, Left Updated: 11/11/20 0705     TSH 3.150 uIU/mL     Comprehensive Metabolic Panel [[id number]]  (Abnormal) Collected: 11/11/20 0536    Specimen: Blood from Arm, Left Updated: 11/11/20 0701     Glucose 85 mg/dL      BUN 16 mg/dL      Creatinine 1.09 mg/dL      Sodium 141 mmol/L      Potassium 3.8 mmol/L      Chloride 109 mmol/L      CO2 23.5 mmol/L      Calcium 8.4 mg/dL      Total Protein 5.5 g/dL      Albumin 3.40 g/dL      ALT (SGPT) 16 U/L      AST (SGOT) 19 U/L      Alkaline Phosphatase 59 U/L      Total Bilirubin 0.2 mg/dL      eGFR Non African Amer 49 mL/min/1.73      Globulin 2.1 gm/dL      A/G Ratio 1.6 g/dL      BUN/Creatinine Ratio 14.7     Anion Gap 8.5 mmol/L     Narrative:      GFR Normal >60  Chronic Kidney Disease <60  Kidney Failure <15      Lipid Panel [[id number]]  (Abnormal) Collected: 11/11/20 0536    Specimen: Blood from Arm, Left Updated: 11/11/20 0701     Total Cholesterol 160 mg/dL      Triglycerides 178 mg/dL      HDL Cholesterol 65 mg/dL      LDL Cholesterol  66 mg/dL      VLDL Cholesterol 29 mg/dL      LDL/HDL Ratio 0.91    Narrative:      Cholesterol Reference Ranges  (U.S. Department of Health and Human Services ATP III Classifications)    Desirable          <200 mg/dL  Borderline High    200-239 mg/dL  High Risk          >240 mg/dL      Triglyceride Reference Ranges  (U.S. Department of Health and Human Services ATP III Classifications)    Normal           <150 mg/dL  Borderline High  150-199 mg/dL  High             200-499 mg/dL  Very High        >500 mg/dL    HDL Reference Ranges  (U.S. Department of Health and Human Services ATP III Classifcations)    Low     <40 mg/dl (major risk factor for CHD)  High    >60 mg/dl ('negative' risk factor for CHD)        LDL Reference Ranges  (U.S. Department of  Health and Human Services ATP III Classifcations)    Optimal          <100 mg/dL  Near Optimal     100-129 mg/dL  Borderline High  130-159 mg/dL  High             160-189 mg/dL  Very High        >189 mg/dL    Hemoglobin A1c [[id number]]  (Normal) Collected: 11/11/20 0536    Specimen: Blood Updated: 11/11/20 0644     Hemoglobin A1C 5.48 %     Narrative:      Hemoglobin A1C Ranges:    Increased Risk for Diabetes  5.7% to 6.4%  Diabetes                     >= 6.5%  Diabetic Goal                < 7.0%    CBC & Differential [[id number]]  (Abnormal) Collected: 11/11/20 0536    Specimen: Blood Updated: 11/11/20 0632    Narrative:      The following orders were created for panel order CBC & Differential.  Procedure                               Abnormality         Status                     ---------                               -----------         ------                     CBC Auto Differential[[id number]]        Abnormal            Final result                 Please view results for these tests on the individual orders.    CBC Auto Differential [[id number]]  (Abnormal) Collected: 11/11/20 0536    Specimen: Blood Updated: 11/11/20 0632     WBC 6.54 10*3/mm3      RBC 3.86 10*6/mm3      Hemoglobin 10.9 g/dL      Hematocrit 33.7 %      MCV 87.3 fL      MCH 28.2 pg      MCHC 32.3 g/dL      RDW 13.4 %      RDW-SD 42.1 fl      MPV 10.3 fL      Platelets 202 10*3/mm3      Neutrophil % 58.1 %      Lymphocyte % 31.0 %      Monocyte % 6.1 %      Eosinophil % 4.0 %      Basophil % 0.5 %      Immature Grans % 0.3 %      Neutrophils, Absolute 3.80 10*3/mm3      Lymphocytes, Absolute 2.03 10*3/mm3      Monocytes, Absolute 0.40 10*3/mm3      Eosinophils, Absolute 0.26 10*3/mm3      Basophils, Absolute 0.03 10*3/mm3      Immature Grans, Absolute 0.02 10*3/mm3      nRBC 0.0 /100 WBC         Imaging Results (Last 24 Hours)     Procedure Component Value Units Date/Time    MRI Cervical Spine Without Contrast [[id number]] Collected: 11/10/20  1758     Updated: 11/10/20 1814    Narrative:      MR SCAN OF THE CERVICAL SPINE WITHOUT CONTRAST     HISTORY: Dizziness. Worsening confusion. Recent right temporal headache.     Paraparesis.      FINDINGS: The MR scan was performed with sagittal and axial images  without contrast and demonstrates the followin. The cervicomedullary junction is normal in position. The foramen  magnum appears normal. There is degenerative change involving the  odontoid and anterior arch of C1 with associated pannus formation  extending posteriorly and causing slight indentation of the anterior  margin of the spinal cord just below the level of the foramen magnum.  2. At C2-3 there is a slight central disc bulge. There is no central or  foraminal encroachment.  3. At C3-4 there is a moderate posterior disc osteophyte complex  partially effacing the anterior epidural space. There is no central or  foraminal encroachment.  4. At C4-5 there is a moderate posterior disc osteophyte complex  effacing the anterior epidural space and causing very slight flattening  deformity of the anterior margin of the spinal cord with slight central  stenosis. There is no foraminal encroachment.  5. At C5-6 there is a moderate posterior disc osteophyte complex  partially effacing the anterior posterior pleural space with some  minimal flattening of the anterior margin of the spinal cord and minimal  central stenosis. There is no foraminal encroachment.  6. At C6-7 there is a prominent posterior disc osteophyte complex  effacing the anterior epidural space and causing flattening deformity of  the anterior margin of the spinal cord with some associated central  stenosis. There is no foraminal encroachment.  7. The C7-T1 level is unremarkable. The upper thoracic spine is  unremarkable as seen on the sagittal images.     This report was finalized on 11/10/2020 6:11 PM by Dr. Venkata Napier M.D.       MRI Brain Without Contrast [[id number]] Collected:  11/10/20 1749     Updated: 11/10/20 1814    Narrative:      MR SCAN OF THE BRAIN WITHOUT CONTRAST     HISTORY: Dizziness. Right-sided headache. Fell 3 weeks ago.     TECHNIQUE: The MR scan was performed with sagittal and axial images  without contrast.      FINDINGS: There is prominent diffuse atrophy and chronic small vessel  ischemic change. There is no evidence of acute intracranial hemorrhage  or mass effect. The diffusion sequence shows no evidence of acute  infarct.     There are normal flow voids in the major vessels. The sinuses and  mastoid air cells are clear.     CONCLUSION: Moderate diffuse atrophy and chronic small vessel ischemic  change. No evidence of acute infarct.     This report was finalized on 11/10/2020 6:11 PM by Dr. Venkata Napier M.D.       MRI Hip Left Without Contrast [[id number]] Collected: 11/10/20 1718     Updated: 11/10/20 1726    Narrative:      MRI LEFT HIP WITHOUT CONTRAST     HISTORY: Hip pain and weakness after a fall. Leg weakness.     TECHNIQUE: MRI of the left hip was performed using protocol which shows  both hips in the axial plane and most of the pelvis in the coronal plane  using T1 and STIR sequences. A sagittal proton density sequence was made  through the left hip. There is no other imaging of the hip for  correlation.     FINDINGS: Marrow signal throughout the pelvis, proximal femurs, and the  L4 and L5 vertebrae is normal. There is no evidence of fracture or bone  lesion.     Coronal images show some degenerative change in the lumbar spine but the  lower lumbar canal appears grossly patent.     There is advanced osteoarthritic change at the left hip with complete  loss of articular cartilage. There is a small left hip effusion, small  subcortical cysts and minimal reactive subcortical marrow signal change.  Marginal osteophytes are also observed around the hip. However, as  already mentioned, there is no evidence of fracture or other acute  posttraumatic deformity  around the left hip.     The muscles and tendons around the left hip and the left hemipelvis  appear normal. There is some edema in the right medial proximal thigh,  that appears to be in the adductor kisha muscle. This is consistent  with muscle strain. Musculature around the right hip and hemipelvis  otherwise appears normal. No intrapelvic lesion is identified.       Impression:      Advanced osteoarthritis at the left hip. No fracture or  other acute posttraumatic deformity is identified around the left hip or  the left hemipelvis. Incidentally noted is fairly diffuse edema and  musculature of the posterior medial right thigh, representing muscle  strain. This appears to be in the adductor kisha.     This report was finalized on 11/10/2020 5:23 PM by Dr. Олег Lovett M.D.              ECG 12 Lead                 HEART RATE= 88  bpm  RR Interval= 644  ms  DE Interval= 191  ms  P Horizontal Axis= 32  deg  P Front Axis= 44  deg  QRSD Interval= 151  ms  QT Interval= 418  ms  QRS Axis= 4  deg  T Wave Axis= 121  deg  - ABNORMAL ECG -  Sinus rhythm  Left bundle branch block  NO SIGNIFICANT CHANGE FROM PREVIOUS ECG              Current Facility-Administered Medications:   •  acetaminophen (TYLENOL) tablet 650 mg, 650 mg, Oral, Q4H PRN, Austyn Calderon MD, 650 mg at 11/10/20 2117  •  albuterol (PROVENTIL) nebulizer solution 0.083% 2.5 mg/3mL, 2.5 mg, Nebulization, Q6H PRN, Austyn Calderon MD  •  allopurinol (ZYLOPRIM) tablet 100 mg, 100 mg, Oral, Daily, Austyn Calderon MD, 100 mg at 11/11/20 0902  •  [START ON 11/12/2020] amLODIPine (NORVASC) tablet 10 mg, 10 mg, Oral, Q24H, Austyn Calderon MD  •  Hold medication, 1 each, Does not apply, Continuous PRN, Karen Barragan APRN  •  [START ON 11/12/2020] metoprolol succinate XL (TOPROL-XL) 24 hr tablet 50 mg, 50 mg, Oral, Q24H, Austyn Calderon MD  •  pantoprazole (PROTONIX) EC tablet 40 mg, 40 mg, Oral, Q AM, Austyn Calderon MD, 40 mg at 11/11/20 0633  •  sodium chloride 0.9 % flush  10 mL, 10 mL, Intravenous, PRN, Chandler Umaña MD, 10 mL at 11/10/20 0851  •  sodium chloride 0.9 % flush 10 mL, 10 mL, Intravenous, PRN, Chandler Umaña MD, 10 mL at 11/09/20 2149  •  sodium chloride 0.9 % with KCl 20 mEq/L infusion, 75 mL/hr, Intravenous, Continuous, Sarah Calderon MD, Last Rate: 75 mL/hr at 11/11/20 0636, 75 mL/hr at 11/11/20 0636     ASSESSMENT  Dizziness with generalized weakness resolving  Hypertension  Personality disorder  Anxiety disorder  Asthma  Gastroesophageal reflux disease    PLAN  CPM  IV fluid  Neuro consult appreciated  Psych to follow patient  Adjust home medications  Stress ulcer DVT prophylaxis  Supportive care  PT OT  Discussed with nursing staff and family  Follow closely further recommendation current hospital course    SARAH CALDERON MD

## 2020-11-11 NOTE — PROGRESS NOTES
DOS: 2020  NAME: Mary Boyle   : 1945  PCP: Babar Jones MD    Chief Complaint   Patient presents with   • Dizziness     PT REPORTS VERTIGO STARTING THIS AM, PT WITH HX OF VERTIGO; PT WEARING FACE MASK        Stroke    Subjective: Pt seen in follow up, however the problem is new to me.  Lying in bed with her daughter at bedside.  Having trouble with her speech and daughter has noticed paraphasic errors and trouble with getting her words out.  Difficult to get her to perform physical exam.    Objective:  Vital signs:      Vitals:    20 0103 20 0548 20 0920 1009   BP: 175/80 172/94 175/96    BP Location: Left arm Left arm     Patient Position: Lying Lying     Pulse: 77 75 85    Resp:    Temp: 98.3 °F (36.8 °C) 98.4 °F (36.9 °C)  98.5 °F (36.9 °C)   TempSrc: Oral Oral  Oral   SpO2: 96% 95%     Weight:       Height:           Current Facility-Administered Medications:   •  acetaminophen (TYLENOL) tablet 650 mg, 650 mg, Oral, Q4H PRN, Austyn Calderon MD, 650 mg at 11/10/20 2117  •  albuterol (PROVENTIL) nebulizer solution 0.083% 2.5 mg/3mL, 2.5 mg, Nebulization, Q6H PRN, Austyn Calderon MD  •  allopurinol (ZYLOPRIM) tablet 100 mg, 100 mg, Oral, Daily, Austyn Calderon MD, 100 mg at 20  •  amLODIPine (NORVASC) tablet 5 mg, 5 mg, Oral, Q24H, Austyn Calderon MD, 5 mg at 20  •  metoprolol succinate XL (TOPROL-XL) 24 hr tablet 25 mg, 25 mg, Oral, Q24H, Austyn Calderon MD, 25 mg at 20  •  pantoprazole (PROTONIX) EC tablet 40 mg, 40 mg, Oral, Q AM, Austyn Calderon MD, 40 mg at 20 0633  •  sodium chloride 0.9 % flush 10 mL, 10 mL, Intravenous, PRN, Chandler Umaña MD, 10 mL at 11/10/20 0851  •  sodium chloride 0.9 % flush 10 mL, 10 mL, Intravenous, PRN, Chandler Umaña MD, 10 mL at 20 2149  •  sodium chloride 0.9 % with KCl 20 mEq/L infusion, 75 mL/hr, Intravenous, Continuous, Austyn Calderon MD, Last Rate: 75 mL/hr at 20 0636,  75 mL/hr at 11/11/20 0636    PRN meds  •  acetaminophen  •  albuterol  •  sodium chloride  •  sodium chloride    No current facility-administered medications on file prior to encounter.      Current Outpatient Medications on File Prior to Encounter   Medication Sig   • albuterol (VENTOLIN HFA) 108 (90 BASE) MCG/ACT inhaler 2 puffs Every 4 (Four) Hours As Needed.   • allopurinol (ZYLOPRIM) 100 MG tablet Take 1 tablet by mouth Daily.   • meclizine (ANTIVERT) 25 MG tablet Take 1 tablet by mouth 3 (Three) Times a Day As Needed for dizziness.   • metoprolol succinate XL (TOPROL-XL) 25 MG 24 hr tablet 50 mg Daily.   • ipratropium-albuterol (DUO-NEB) 0.5-2.5 mg/mL nebulizer 2 sprays into each nostril daily       General appearance: Elderly female appears older than stated age, NAD, alert and semi-cooperative, well groomed  HEENT: Normocephalic, atraumatic, PERRL, no masses or tenderness  COR: RRR  Resp: Even and unlabored  Extremities: No obvious edema  Skin: warm, dry    Neurological:   MS: oriented to self only, recent/remote memory significantly impaired, decreased attention/concentration, expressive aphasia with paraphasic errors, no neglect  CN: visual fields full, PERRL, EOMI, no facial droop  Motor: Moves all extremities spontaneously, difficulty testing strength due to inability to follow simple commands  Reflexes: toes downgoing  Sensory: light touch sensation intact in all 4 ext.  Coordination: Normal finger to nose test  Gait and station: [facility] 2-3 person assist  Rapid alternating movements: normal finger to thumb tap    Laboratory results:  Lab Results   Component Value Date    TSH 3.150 11/11/2020     Lab Results   Component Value Date    HGBA1C 5.48 11/11/2020     Lab Results   Component Value Date    VAQIIXOS69 1,056 (H) 11/05/2020     Lab Results   Component Value Date    CHOL 160 11/11/2020    CHLPL 208 (H) 05/14/2020    CHLPL 201 (H) 10/17/2019    CHLPL 183 04/10/2019     Lab Results   Component Value Date      TRIG 178 (H) 11/11/2020    TRIG 115 05/14/2020    TRIG 173 (H) 10/17/2019     Lab Results   Component Value Date    HDL 65 (H) 11/11/2020    HDL 85 05/14/2020    HDL 75 (H) 10/17/2019     Lab Results   Component Value Date    LDL 66 11/11/2020     05/14/2020    LDL 91 10/17/2019     Lab Results   Component Value Date    WBC 6.54 11/11/2020    HGB 10.9 (L) 11/11/2020    HCT 33.7 (L) 11/11/2020    MCV 87.3 11/11/2020     11/11/2020     Lab Results   Component Value Date    GLUCOSE 85 11/11/2020    BUN 16 11/11/2020    CREATININE 1.09 (H) 11/11/2020    EGFRIFNONA 49 (L) 11/11/2020    EGFRIFAFRI 62 10/17/2019    BCR 14.7 11/11/2020    K 3.8 11/11/2020    CO2 23.5 11/11/2020    CALCIUM 8.4 (L) 11/11/2020    PROTENTOTREF 6.5 10/17/2019    ALBUMIN 3.40 (L) 11/11/2020    LABIL2 1.3 11/05/2020    AST 19 11/11/2020    ALT 16 11/11/2020     No results found for: PTT  Lab Results   Component Value Date    INR 1.1 11/09/2015    PROTIME 13.5 11/09/2015     Brief Urine Lab Results  (Last result in the past 365 days)      Color   Clarity   Blood   Leuk Est   Nitrite   Protein   CREAT   Urine HCG        11/09/20 1302 Yellow Clear Negative Negative Negative Negative               Review and interpretation of imaging:  Ct Head Without Contrast    Result Date: 11/9/2020  1. Atrophy and small vessel white matter ischemic disease. 2. No acute intracranial process identified.  Radiation dose reduction techniques were utilized, including automated exposure control and exposure modulation based on body size.  This report was finalized on 11/9/2020 8:27 PM by Dr. Kosta Ramírez M.D.      Xr Chest 1 View    Result Date: 11/9/2020  No acute findings  This report was finalized on 11/9/2020 1:45 PM by Dr. Peter Hale M.D.      Mri Hip Left Without Contrast    Result Date: 11/10/2020  Advanced osteoarthritis at the left hip. No fracture or other acute posttraumatic deformity is identified around the left hip or the left  hemipelvis. Incidentally noted is fairly diffuse edema and musculature of the posterior medial right thigh, representing muscle strain. This appears to be in the adductor kisha.  This report was finalized on 11/10/2020 5:23 PM by Dr. Олег Lovett M.D.      Results for orders placed in visit on 08/12/19   Adult Transthoracic Echo Complete W/ Cont if Necessary Per Protocol    Narrative · Calculated EF = 64%.  · There is no evidence of pericardial effusion.            Impression/Assessment:  This is a 74-year-old female with past medical history of hypertension, depression, who presented to the hospital on 11/9/2020 with complaints of progressive generalized weakness and inability to walk with a right temporal headache.  Reportedly symptoms had been having worsening difficulty walking over the last 3 months causing her to have to use a cane and now a walker.  Over the last month the patient's daughter also noted that the patient has been having worsening cognitive changes with disorientation and short-term memory impairment.  2 weeks ago she fell and injured her left hip.  The day of admission the patient was sitting on the toilet and was unable to get herself off due to lower extremity weakness L >R.  Her headache did resolve.  Daughter reports she has had issues with bladder control as well for 6 months.  BP on arrival 171/97, HR 74.  EKG with NSR, LBBB.  BS 87.Temp 97.4. Cr. 1.16.    Diagnosis: Progressive lower extremity weakness, memory loss, left hip pain s/p fall    Work up to date:  11/9 CTH WO: No acute intracranial abnormalities, prominent atrophy and chronic small vessel disease.  11/10 MRI Brain WO: No acute intracranial abnormalities, prominent diffuse atrophy and chronic small vessel changes, transependymal edema noted.  11/10 MRI C-spine WO: Moderate posterior disc osteophyte complex effacing the anterior epidural space causing very slight flattening deformity of the spinal cord with slight central  stenosis at C4-5, C5-6, and C6-7.  11/10  MRI Left Hip WO: Advanced osteoarthritis, no fracture, fairly diffuse edema and musculature of the posterior medial right thigh representing muscle strain.  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 Will go ahead and order a large volume tap to be performed today. Would like for PT to perform Tinetti before and as soon as possible after flat time is over post LP.  MRI hip revealed no evidence for fracture or deformity of left hip, she had evidence of muscle strain of the right thigh.   DONNA/SCDs  Therapies as written. CCP for discharge planning. Call RRT for any acute neurological changes. We will continue to follow and advise.    Case discussed with patient, daughter at bedside, RN, and Dr. Schwartz, and he agrees with plan above.   JAMES Leiva

## 2020-11-12 ENCOUNTER — PREP FOR SURGERY (OUTPATIENT)
Dept: OTHER | Facility: HOSPITAL | Age: 75
End: 2020-11-12

## 2020-11-12 DIAGNOSIS — G91.2 NPH (NORMAL PRESSURE HYDROCEPHALUS) (HCC): Primary | ICD-10-CM

## 2020-11-12 LAB
ANION GAP SERPL CALCULATED.3IONS-SCNC: 11.5 MMOL/L (ref 5–15)
APTT PPP: 28.7 SECONDS (ref 22.7–35.4)
BASOPHILS # BLD AUTO: 0.03 10*3/MM3 (ref 0–0.2)
BASOPHILS NFR BLD AUTO: 0.4 % (ref 0–1.5)
BUN SERPL-MCNC: 13 MG/DL (ref 8–23)
BUN/CREAT SERPL: 15.3 (ref 7–25)
CALCIUM SPEC-SCNC: 8.7 MG/DL (ref 8.6–10.5)
CHLORIDE SERPL-SCNC: 105 MMOL/L (ref 98–107)
CO2 SERPL-SCNC: 24.5 MMOL/L (ref 22–29)
CREAT SERPL-MCNC: 0.85 MG/DL (ref 0.57–1)
DEPRECATED RDW RBC AUTO: 42 FL (ref 37–54)
EOSINOPHIL # BLD AUTO: 0.16 10*3/MM3 (ref 0–0.4)
EOSINOPHIL NFR BLD AUTO: 2.2 % (ref 0.3–6.2)
ERYTHROCYTE [DISTWIDTH] IN BLOOD BY AUTOMATED COUNT: 13.5 % (ref 12.3–15.4)
GFR SERPL CREATININE-BSD FRML MDRD: 65 ML/MIN/1.73
GLUCOSE SERPL-MCNC: 86 MG/DL (ref 65–99)
HCT VFR BLD AUTO: 38.3 % (ref 34–46.6)
HGB BLD-MCNC: 12.8 G/DL (ref 12–15.9)
IMM GRANULOCYTES # BLD AUTO: 0.02 10*3/MM3 (ref 0–0.05)
IMM GRANULOCYTES NFR BLD AUTO: 0.3 % (ref 0–0.5)
INR PPP: 1.01 (ref 0.9–1.1)
LYMPHOCYTES # BLD AUTO: 1.53 10*3/MM3 (ref 0.7–3.1)
LYMPHOCYTES NFR BLD AUTO: 20.9 % (ref 19.6–45.3)
MCH RBC QN AUTO: 28.7 PG (ref 26.6–33)
MCHC RBC AUTO-ENTMCNC: 33.4 G/DL (ref 31.5–35.7)
MCV RBC AUTO: 85.9 FL (ref 79–97)
MONOCYTES # BLD AUTO: 0.39 10*3/MM3 (ref 0.1–0.9)
MONOCYTES NFR BLD AUTO: 5.3 % (ref 5–12)
NEUTROPHILS NFR BLD AUTO: 5.19 10*3/MM3 (ref 1.7–7)
NEUTROPHILS NFR BLD AUTO: 70.9 % (ref 42.7–76)
NRBC BLD AUTO-RTO: 0 /100 WBC (ref 0–0.2)
PLATELET # BLD AUTO: 227 10*3/MM3 (ref 140–450)
PMV BLD AUTO: 11.1 FL (ref 6–12)
POTASSIUM SERPL-SCNC: 3.8 MMOL/L (ref 3.5–5.2)
PROTHROMBIN TIME: 13.2 SECONDS (ref 11.7–14.2)
RBC # BLD AUTO: 4.46 10*6/MM3 (ref 3.77–5.28)
SODIUM SERPL-SCNC: 141 MMOL/L (ref 136–145)
WBC # BLD AUTO: 7.32 10*3/MM3 (ref 3.4–10.8)

## 2020-11-12 PROCEDURE — 009U3ZX DRAINAGE OF SPINAL CANAL, PERCUTANEOUS APPROACH, DIAGNOSTIC: ICD-10-PCS | Performed by: RADIOLOGY

## 2020-11-12 PROCEDURE — 25810000003 SODIUM CHLORIDE 0.9 % WITH KCL 20 MEQ 20-0.9 MEQ/L-% SOLUTION: Performed by: HOSPITALIST

## 2020-11-12 PROCEDURE — 85025 COMPLETE CBC W/AUTO DIFF WBC: CPT | Performed by: HOSPITALIST

## 2020-11-12 PROCEDURE — 99221 1ST HOSP IP/OBS SF/LOW 40: CPT | Performed by: SURGERY

## 2020-11-12 PROCEDURE — 80048 BASIC METABOLIC PNL TOTAL CA: CPT | Performed by: HOSPITALIST

## 2020-11-12 PROCEDURE — 97530 THERAPEUTIC ACTIVITIES: CPT

## 2020-11-12 PROCEDURE — 85730 THROMBOPLASTIN TIME PARTIAL: CPT | Performed by: NEUROLOGICAL SURGERY

## 2020-11-12 PROCEDURE — 99232 SBSQ HOSP IP/OBS MODERATE 35: CPT | Performed by: NURSE PRACTITIONER

## 2020-11-12 PROCEDURE — 99221 1ST HOSP IP/OBS SF/LOW 40: CPT | Performed by: PHYSICIAN ASSISTANT

## 2020-11-12 PROCEDURE — 85610 PROTHROMBIN TIME: CPT | Performed by: NEUROLOGICAL SURGERY

## 2020-11-12 PROCEDURE — 97116 GAIT TRAINING THERAPY: CPT

## 2020-11-12 RX ORDER — HYDRALAZINE HYDROCHLORIDE 25 MG/1
25 TABLET, FILM COATED ORAL EVERY 8 HOURS SCHEDULED
Status: DISCONTINUED | OUTPATIENT
Start: 2020-11-12 | End: 2020-11-13

## 2020-11-12 RX ADMIN — ACETAMINOPHEN 650 MG: 325 TABLET, FILM COATED ORAL at 08:46

## 2020-11-12 RX ADMIN — AMLODIPINE BESYLATE 10 MG: 10 TABLET ORAL at 08:45

## 2020-11-12 RX ADMIN — HYDRALAZINE HYDROCHLORIDE 25 MG: 25 TABLET, FILM COATED ORAL at 22:55

## 2020-11-12 RX ADMIN — METOPROLOL SUCCINATE 50 MG: 50 TABLET, EXTENDED RELEASE ORAL at 08:45

## 2020-11-12 RX ADMIN — ALLOPURINOL 100 MG: 100 TABLET ORAL at 08:45

## 2020-11-12 RX ADMIN — HYDRALAZINE HYDROCHLORIDE 10 MG: 10 TABLET, FILM COATED ORAL at 06:18

## 2020-11-12 RX ADMIN — PANTOPRAZOLE SODIUM 40 MG: 40 TABLET, DELAYED RELEASE ORAL at 06:16

## 2020-11-12 RX ADMIN — HYDRALAZINE HYDROCHLORIDE 10 MG: 10 TABLET, FILM COATED ORAL at 14:38

## 2020-11-12 RX ADMIN — POTASSIUM CHLORIDE AND SODIUM CHLORIDE 50 ML/HR: 900; 150 INJECTION, SOLUTION INTRAVENOUS at 01:23

## 2020-11-12 NOTE — PLAN OF CARE
Pt demonstrates significant improvement in activity tolerance and balance today improving TINETTI to:  Balance: 6; gait: 5  Total: 11, still high falls risk. Pt continues to demonstrates mild L sided trunk lean during sitting balance, still episodes of freezing during gait. Needs min/modA to navigate FWW, however able to progress distance to 8 ft, then 5 ft after toileting activity. Pt improved bed mobility to Jayson however requires maxA with draw sheet for scooting, improved sit<>stand with CGA and maintaining upright posture with CGA. Skilled PT needed to address above impairments. DC recs include SNU/KATHRINE.    .Patient was intermittently wearing a face mask during this therapy encounter. Therapist used appropriate personal protective equipment including eye protection, mask, and gloves.  Mask used was standard procedure mask. Appropriate PPE was worn during the entire therapy session. Hand hygiene was completed before and after therapy session. Patient is not in enhanced droplet precautions.    Problem: Adult Inpatient Plan of Care  Goal: Plan of Care Review  11/12/2020 1041 by Purnima Bowden, PT  Outcome: Ongoing, Progressing  11/12/2020 1016 by Purnima Bowden, PT  Outcome: Ongoing, Progressing
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
## 2020-11-12 NOTE — PLAN OF CARE
Goal Outcome Evaluation:  Plan of Care Reviewed With: patient, daughter  Progress: improving  Outcome Summary: Up to chair and back to bed with assist x2. Forgetfulness noted today, reoriented. LP this afternoon. HOB flat except elevated to eat dinner. Complaints of headache and left hip pain, medicated with prn Tylenol and drank some Mt. Dew, decreased complaints of pain.

## 2020-11-12 NOTE — THERAPY TREATMENT NOTE
Patient Name: Mary Boyle  : 1945    MRN: [id number]                              Today's Date: 2020       Admit Date: 2020    Visit Dx:     ICD-10-CM ICD-9-CM   1. Generalized weakness  R53.1 780.79   2. Dizziness  R42 780.4   3. Left bundle branch block  I44.7 426.3   4. Essential hypertension  I10 401.9     Patient Active Problem List   Diagnosis   • Chest pain   • Colon polyp   • COPD (chronic obstructive pulmonary disease) (CMS/HCC)   • Diverticulosis   • Essential hypertension   • GERD without esophagitis   • Hypercholesterolemia   • Osteopenia   • Multiple thyroid nodules   • Chronic fatigue   • Prediabetes   • Vitamin D deficiency   • Renal insufficiency   • Leg swelling   • Hyperuricemia   • SOB (shortness of breath)   • Generalized weakness     Past Medical History:   Diagnosis Date   • Arthritis    • Asthma    • Chest pain    • Depression    • Ganglion cyst     right leg   • GERD (gastroesophageal reflux disease)    • Gout    • Hiatal hernia    • Hypertension    • Hypokalemia    • Mild mitral regurgitation    • Thyroid nodule    • Tricuspid regurgitation    • Vitamin D deficiency      Past Surgical History:   Procedure Laterality Date   • APPENDECTOMY     • BREAST LUMPECTOMY     • CHOLECYSTECTOMY     • GANGLION CYST EXCISION     • HERNIA REPAIR     • HYSTERECTOMY       General Information     Row Name 20 1014          Physical Therapy Time and Intention    Document Type  therapy note (daily note)  -AE     Mode of Treatment  individual therapy;physical therapy  -AE     Row Name 20 1014          General Information    Patient Profile Reviewed  yes  -AE       User Key  (r) = Recorded By, (t) = Taken By, (c) = Cosigned By    Initials Name Provider Type    AE Purnima Bowden PT Physical Therapist        Mobility     Row Name 20 1014          Bed Mobility    All Activities, Lares (Bed Mobility)  minimum assist (75% patient effort);moderate assist (50% patient  effort);1 person assist  -AE     Assistive Device (Bed Mobility)  bed rails;head of bed elevated  -AE     Row Name 11/12/20 1014          Transfers    Comment (Transfers)  Jayson at trunk for sitting, mod/maxA for scooting with use of draw sheet; minimal L trunk lean  -AE     Row Name 11/12/20 1014          Bed-Chair Transfer    Bed-Chair [place] (Transfers)  minimum assist (75% patient effort);1 person assist  -AE     Assistive Device (Bed-Chair Transfers)  walker, front-wheeled  -AE     Row Name 11/12/20 1014          Sit-Stand Transfer    Sit-Stand [place] (Transfers)  contact guard;1 person assist  -AE     Assistive Device (Sit-Stand Transfers)  walker, front-wheeled  -AE     Row Name 11/12/20 1014          Gait/Stairs (Locomotion)    [place] Level (Gait)  minimum assist (75% patient effort);1 person assist  -AE     Distance in Feet (Gait)  8ft, 5 ft with FWW, CGA to maintain standing, min/modA to navigate FWW  -AE       User Key  (r) = Recorded By, (t) = Taken By, (c) = Cosigned By    Initials Name Provider Type    Purnima Lofton PT Physical Therapist        Obj/Interventions    No documentation.       Goals/Plan    No documentation.       Clinical Impression     Row Name 11/12/20 1015          Pain    Additional Documentation  Pain Scale: Numbers Pre/Post-Treatment (Group)  -AE     [facility] Name 11/12/20 1015          Pain Scale: Numbers Pre/Post-Treatment    Pretreatment Pain Rating  0/10 - no pain  -AE     Posttreatment Pain Rating  0/10 - no pain  -AE     Row Name 11/12/20 1015          Positioning and Restraints    Pre-Treatment Position  in bed  -AE     Post Treatment Position  chair  -AE     In Chair  sitting;call light within reach;encouraged to call for assist;exit alarm on  -AE       User Key  (r) = Recorded By, (t) = Taken By, (c) = Cosigned By    Initials Name Provider Type    Purnima Lofton PT Physical Therapist        Outcome Measures     Row Name 11/12/20 1016          How  "much help from another person do you currently need...    Turning from your back to your side while in flat bed without using bedrails?  2  -AE     Moving from lying on back to sitting on the side of a flat bed without bedrails?  2  -AE     Moving to and from a bed to a chair (including a wheelchair)?  3  -AE     Standing up from a chair using your arms (e.g., wheelchair, bedside chair)?  3  -AE     Climbing 3-5 steps with a railing?  1  -AE     To walk in hospital room?  3  -AE     AM-PAC 6 Clicks Score (PT)  14  -AE     Row Name 11/12/20 1040          Tinetti Assessment    Sitting Balance  0  -AE     Arises  1  -AE     Attempts to Rise  2  -AE     Immediate Standing Balance (first 5 sec)  1  -AE     Standing Balance  1  -AE     Sternal Nudge (feet close together)  0  -AE     Eyes Closed (feet close together)  0  -AE     Turning 360 Degrees- Steps  0  -AE     Turning 360 Degrees- Steadiness  0  -AE     Sitting Down  1  -AE     Tinetti Balance Score  6  -AE     Gait Initiation (immediate after told \"go\")  0  -AE     Step Length- Right Swing  1  -AE     Step Length- Left Swing  1  -AE     Foot Clearance- Right Foot  1  -AE     Foot Clearance- Left Foot  1  -AE     Step Symmetry  1  -AE     Step Continuity  0  -AE     Path (excursion)  0  -AE     Trunk  0  -AE     Base of Support  0  -AE     Gait Score  5  -AE     Tinetti Total Score  11  -AE     Row Name 11/12/20 1040 11/12/20 1016       Functional Assessment    Outcome Measure Options  Tinetti  -AE  AM-PAC 6 Clicks Basic Mobility (PT)  -AE      User Key  (r) = Recorded By, (t) = Taken By, (c) = Cosigned By    Initials Name Provider Type    AE Purnima Bowden PT Physical Therapist        Physical Therapy Education                 Title: PT OT SLP Therapies (In Progress)     Topic: Physical Therapy (Done)     Point: Mobility training (Done)     Learning Progress Summary           Patient Acceptance, E,TB, VU,NR by AE at 11/11/2020 1058                   Point: Home " exercise program (Done)     Learning Progress Summary           Patient Acceptance, E,TB, VU,NR by AE at 11/11/2020 1058                   Point: Body mechanics (Done)     Learning Progress Summary           Patient Acceptance, E,TB, VU,NR by AE at 11/11/2020 1058                   Point: Precautions (Done)     Learning Progress Summary           Patient Acceptance, E,TB, VU,NR by AE at 11/11/2020 1058                               User Key     Initials Effective Dates Name Provider Type Discipline    AE 09/04/19 -  Purnima Bowden PT Physical Therapist PT              PT Recommendation and Plan  Planned Therapy Interventions (PT): balance training, bed mobility training, gait training, home exercise program, motor coordination training, neuromuscular re-education, patient/family education, postural re-education, ROM (range of motion), stair training, strengthening, stretching, transfer training  Plan of Care Reviewed With: patient     Time Calculation:   PT Charges     Row Name 11/12/20 1044 11/12/20 1030          Time Calculation    Start Time  1015  -AE  --     Stop Time  1044  -AE  --     Time Calculation (min)  29 min  -AE  --     PT Received On  --  11/12/20  -AE     PT - Next Appointment  --  11/13/20  -AE        Time Calculation- PT    Total Timed Code Minutes- PT  25 minute(s)  -AE  --       User Key  (r) = Recorded By, (t) = Taken By, (c) = Cosigned By    Initials Name Provider Type    AE Purnima Bowden PT Physical Therapist        Therapy Charges for Today     Code Description Service Date Service Provider Modifiers Qty    50569224853 HC PT EVAL MOD COMPLEXITY 2 11/11/2020 Purnima Bowden, PT GP 1    64961358661 HC PT THERAPEUTIC ACT EA 15 MIN 11/11/2020 Purnima Bowden, PT GP 1    89792083157 HC GAIT TRAINING EA 15 MIN 11/12/2020 Purnima Bowden, PT GP 1    35723302044 HC PT THERAPEUTIC ACT EA 15 MIN 11/12/2020 Purnima Bowden, PT GP 1          PT G-Codes  Outcome Measure Options:  Tinetti  AM-PAC 6 Clicks Score (PT): 14  AM-PAC 6 Clicks Score (OT): 15  Modified Marj Scale: 4 - Moderately severe disability.  Unable to walk without assistance, and unable to attend to own bodily needs without assistance.  Tinetti Total Score: 11    Purnima Bowden, PT  11/12/2020

## 2020-11-12 NOTE — PLAN OF CARE
Goal Outcome Evaluation:  Plan of Care Reviewed With: patient, daughter  Progress: improving  Outcome Summary: Alert and oriented but forgetfulness noted. Up to chair with PT and back to bed with nursing. Encouragement given to sit in chair. Plan for OR soon. Consent signed. NPO after MN.

## 2020-11-12 NOTE — PROGRESS NOTES
"Daily progress note    Chief complaint  Doing same  Still complaining of weakness  No more dizziness lightheadedness  Family at bedside    History of present illness  74 white female with history of asthma osteoarthritis depression hypertension and gastroesophageal reflux disease presented to [facility] emergency room with intermittent dizziness started yesterday with generalized weakness nausea but no vomiting diarrhea.  Patient also have headache for last 3 weeks.  Patient denies any focal weakness.  Patient work-up in ER not conclusive admit for further management.  Patient also denies any fever chills chest pain increased shortness of breath but looks very anxious.  Patient does have a history of personality disorder.      REVIEW OF SYSTEMS  Unremarkable except generalized weakness     PHYSICAL EXAM   Blood pressure 176/95, pulse 80, temperature 97.8 °F (36.6 °C), temperature source Oral, resp. rate 18, height 147.3 cm (58\"), weight 54.4 kg (120 lb), SpO2 96 %, not currently breastfeeding.    GENERAL: Chronically ill-appearing, non-toxic appearing, not distressed  HENT: normocephalic, atraumatic  EYES: no scleral icterus, PERRL  CV: regular rhythm, regular rate, no murmur  RESPIRATORY: normal effort, CTAB  ABDOMEN: soft nontender bowel sounds positive  MUSCULOSKELETAL: no deformity  NEURO: alert, moves all extremities, follows commands, mental status normal/baseline  SKIN: warm, dry, no rash   Psych: Appropriate mood and affect    LAB RESULTS  Lab Results (last 24 hours)     Procedure Component Value Units Date/Time    Basic Metabolic Panel [[id number]]  (Normal) Collected: 11/12/20 0548    Specimen: Blood Updated: 11/12/20 0851     Glucose 86 mg/dL      BUN 13 mg/dL      Creatinine 0.85 mg/dL      Sodium 141 mmol/L      Potassium 3.8 mmol/L      Chloride 105 mmol/L      CO2 24.5 mmol/L      Calcium 8.7 mg/dL      eGFR Non African Amer 65 mL/min/1.73      BUN/Creatinine Ratio 15.3     Anion Gap 11.5 " mmol/L     Narrative:      GFR Normal >60  Chronic Kidney Disease <60  Kidney Failure <15      CBC & Differential [[id number]]  (Normal) Collected: 11/12/20 0548    Specimen: Blood Updated: 11/12/20 0827    Narrative:      The following orders were created for panel order CBC & Differential.  Procedure                               Abnormality         Status                     ---------                               -----------         ------                     CBC Auto Differential[[id number]]        Normal              Final result                 Please view results for these tests on the individual orders.    CBC Auto Differential [[id number]]  (Normal) Collected: 11/12/20 0548    Specimen: Blood Updated: 11/12/20 0827     WBC 7.32 10*3/mm3      RBC 4.46 10*6/mm3      Hemoglobin 12.8 g/dL      Hematocrit 38.3 %      MCV 85.9 fL      MCH 28.7 pg      MCHC 33.4 g/dL      RDW 13.5 %      RDW-SD 42.0 fl      MPV 11.1 fL      Platelets 227 10*3/mm3      Neutrophil % 70.9 %      Lymphocyte % 20.9 %      Monocyte % 5.3 %      Eosinophil % 2.2 %      Basophil % 0.4 %      Immature Grans % 0.3 %      Neutrophils, Absolute 5.19 10*3/mm3      Lymphocytes, Absolute 1.53 10*3/mm3      Monocytes, Absolute 0.39 10*3/mm3      Eosinophils, Absolute 0.16 10*3/mm3      Basophils, Absolute 0.03 10*3/mm3      Immature Grans, Absolute 0.02 10*3/mm3      nRBC 0.0 /100 WBC     Protein, CSF - Cerebrospinal Fluid, Lumbar Puncture [[id number]]  (Normal) Collected: 11/11/20 1559    Specimen: Cerebrospinal Fluid from Lumbar Puncture Updated: 11/11/20 1704     Protein, Total (CSF) 29.0 mg/dL     Glucose, CSF - Cerebrospinal Fluid, Lumbar Puncture [[id number]]  (Normal) Collected: 11/11/20 1559    Specimen: Cerebrospinal Fluid from Lumbar Puncture Updated: 11/11/20 1704     Glucose, CSF 54 mg/dL     Cell Count With Differential, CSF Use CSF Tube: 1 [[id number]]  (Abnormal) Collected: 11/11/20 1559    Specimen: Cerebrospinal Fluid from Lumbar  Puncture Updated: 11/11/20 1655    Narrative:      The following orders were created for panel order Cell Count With Differential, CSF Use CSF Tube: 1.  Procedure                               Abnormality         Status                     ---------                               -----------         ------                     Cell Count, CSF - Cerebr...[[id number]]  Abnormal            Final result               Spinal fluid differentia...[[id number]]                                                   Please view results for these tests on the individual orders.    Cell Count, CSF - Cerebrospinal Fluid, Lumbar Puncture [[id number]]  (Abnormal) Collected: 11/11/20 1559    Specimen: Cerebrospinal Fluid from Lumbar Puncture Updated: 11/11/20 1655     Color, CSF Colorless     Appearance, CSF Clear     RBC, CSF 37 /mm3      Nucleated Cells, CSF 1 /mm3      Tube Number, CSF 1     Method: Easy Metrics 1000i Automated Method    Narrative:      Differential not indicated.  This test was developed, its performance characteristics determined and judged suitable for clinical purposes by [facility]. It has not been cleared or approved by the FDA. The laboratory is regulated under CLIA as qualified to perform high-complexity testing.        Imaging Results (Last 24 Hours)     Procedure Component Value Units Date/Time    IR Lumbar Puncture Diag/Thera [[id number]] Collected: 11/11/20 1641     Updated: 11/12/20 0907    Narrative:      FLUOROSCOPIC GUIDED LUMBAR PUNCTURE 11/11/2020     HISTORY: Bilateral lower extremity weakness. Gait imbalance. Possible  normal pressure hydrocephalus.     After signed informed consent was obtained, the patient was prepped and  draped in the prone position. Lidocaine was used for local anesthesia.     FINDINGS: A 20-gauge needle was introduced into the thecal sac at a  midlumbar level by Dr. Ramírez. Opening pressure is approximately 16  cm. Approximately 29 mL of clear CSF was removed.       Closing pressure is less than 5 cm.     Confirmatory images were obtained.     Patient tolerated the procedure well with no complications.        FLUOROSCOPY TIME: 26 seconds, 2 images.     This report was finalized on 11/12/2020 9:04 AM by Dr. Kosta Ramírez M.D.              ECG 12 Lead                 HEART RATE= 88  bpm  RR Interval= 644  ms  TX Interval= 191  ms  P Horizontal Axis= 32  deg  P Front Axis= 44  deg  QRSD Interval= 151  ms  QT Interval= 418  ms  QRS Axis= 4  deg  T Wave Axis= 121  deg  - ABNORMAL ECG -  Sinus rhythm  Left bundle branch block  NO SIGNIFICANT CHANGE FROM PREVIOUS ECG              Current Facility-Administered Medications:   •  acetaminophen (TYLENOL) tablet 650 mg, 650 mg, Oral, Q4H PRN, Austyn Calderon MD, 650 mg at 11/12/20 0846  •  albuterol (PROVENTIL) nebulizer solution 0.083% 2.5 mg/3mL, 2.5 mg, Nebulization, Q6H PRN, Austyn Calderon MD  •  allopurinol (ZYLOPRIM) tablet 100 mg, 100 mg, Oral, Daily, Austyn Calderon MD, 100 mg at 11/12/20 0845  •  amLODIPine (NORVASC) tablet 10 mg, 10 mg, Oral, Q24H, Austyn Calderon MD, 10 mg at 11/12/20 0845  •  Hold medication, 1 each, Does not apply, Continuous PRN, Karen Barragan, APRN  •  hydrALAZINE (APRESOLINE) tablet 10 mg, 10 mg, Oral, Q8H, Austyn Calderon MD, 10 mg at 11/12/20 0618  •  metoprolol succinate XL (TOPROL-XL) 24 hr tablet 50 mg, 50 mg, Oral, Q24H, Austyn Calderon MD, 50 mg at 11/12/20 0845  •  pantoprazole (PROTONIX) EC tablet 40 mg, 40 mg, Oral, Q AM, Austyn Calderon MD, 40 mg at 11/12/20 0616  •  sodium chloride 0.9 % flush 10 mL, 10 mL, Intravenous, PRN, LeeroyChandler cochran MD, 10 mL at 11/10/20 0851  •  sodium chloride 0.9 % flush 10 mL, 10 mL, Intravenous, PRN, Chandler Umaña MD, 10 mL at 11/09/20 2149  •  sodium chloride 0.9 % with KCl 20 mEq/L infusion, 50 mL/hr, Intravenous, Continuous, Austyn Calderon MD, Last Rate: 50 mL/hr at 11/12/20 0123, 50 mL/hr at 11/12/20 0123     ASSESSMENT  Dizziness with generalized weakness  resolving  Concern of NPH  Status post lumbar puncture  Hypertension  Personality disorder  Anxiety disorder  Asthma  Gastroesophageal reflux disease    PLAN  CPM  IV fluid  Check LP study results  Neuro consult appreciated  Psych to follow patient  Adjust home medications  Stress ulcer DVT prophylaxis  Supportive care  PT OT  Discussed with nursing staff and family  Follow closely further recommendation current hospital course    SARAH MÁRQUEZ MD

## 2020-11-12 NOTE — PROGRESS NOTES
Continued Stay Note  [facility]     Patient Name: Mary Boyle  MRN: [id number]  Today's Date: 11/12/2020    Admit Date: 11/9/2020    Discharge Plan     Row Name 11/12/20 1353       Plan    Plan  jillian Carrera obtained    Plan Comments  Inbound call from Ellen/Sergio. Pre-cert has been obtained and they will hold a bed. Follow up tomorrow after Neurology sees patient. Zuleika Pinto RN        Discharge Codes    No documentation.             Zuleika Pinto RN

## 2020-11-12 NOTE — PROGRESS NOTES
"DOS: 2020  NAME: Mary Boyle   : 1945  PCP: Babar Jones MD  Chief Complaint   Patient presents with   • Dizziness     PT REPORTS VERTIGO STARTING THIS AM, PT WITH HX OF VERTIGO; PT WEARING FACE MASK     Stroke    Subjective: No family at bedside.  Patient did work with PT this morning and per patient she feels her gait is improved following lumbar puncture yesterday.  She denies headache.  She notes urinary incontinence issues for the past 6 months.  She complains of left hip/thigh pain.  Pt seen in follow up today, however the problem is new to the examiner.      Objective:  Vital signs: /95 (BP Location: Right arm, Patient Position: Sitting)   Pulse 80   Temp 97.8 °F (36.6 °C) (Oral)   Resp 18   Ht 147.3 cm (58\")   Wt 54.4 kg (120 lb)   SpO2 96%   BMI 25.08 kg/m²       General appearance: Well developed, well nourished,  alert and cooperative.   HEENT: Normocephalic.   Neck and spine: Normal range of motion. Normal alignment. No mass or tenderness.    Cardiac: Regular rate and rhythm. No murmurs.   Peripheral Vasculature: Radial pulses are equal and symmetric.  Chest Exam: Clear to auscultation bilaterally, no wheezes, no rhonchi.  Extremities: Normal, no edema.   Skin: No rashes or birthmarks.     Higher integrative function: Oriented to self, month, year, and location but not day.  Speech fluent, no dysarthria.  No obvious thought disorder, hallucinations, or delusions.  CN II: Normal visual fields.   CN III IV VI: Extraocular movements are full without nystagmus. Pupils are equal, round, and reactive to light.    CN V: Normal facial sensation.  CN VII: Facial movements are symmetric, no weakness.   CN VIII: Auditory acuity is normal.   CN IX & X: Symmetric palatal movement.   CN XI: Sternocleidomastoid and trapezius are normal. No weakness.   CN XII: The tongue is midline.   Motor: Muscle strength 5 out of 5 in bilateral upper extremities right lower extremity, 4 out " of 5 and antalgic in left lower extremity.  Increased tone in bilateral lower extremities.  Sensation: Light touch intact in all extremities.  Station and gait: N/A safety  Muscle stretch reflexes: Plantar reflexes are flexor bilaterally.   Coordination: Finger to nose test showed no dysmetria.     Scheduled Meds:allopurinol, 100 mg, Oral, Daily  amLODIPine, 10 mg, Oral, Q24H  hydrALAZINE, 25 mg, Oral, Q8H  metoprolol succinate XL, 50 mg, Oral, Q24H  pantoprazole, 40 mg, Oral, Q AM      Continuous Infusions:hold, 1 each      PRN Meds:.•  acetaminophen  •  albuterol  •  hold  •  sodium chloride  •  sodium chloride    Laboratory results:  Lab Results   Component Value Date    GLUCOSE 86 11/12/2020    CALCIUM 8.7 11/12/2020     11/12/2020    K 3.8 11/12/2020    CO2 24.5 11/12/2020     11/12/2020    BUN 13 11/12/2020    CREATININE 0.85 11/12/2020    EGFRIFAFRI 62 10/17/2019    EGFRIFNONA 65 11/12/2020    BCR 15.3 11/12/2020    ANIONGAP 11.5 11/12/2020     Lab Results   Component Value Date    WBC 7.32 11/12/2020    HGB 12.8 11/12/2020    HCT 38.3 11/12/2020    MCV 85.9 11/12/2020     11/12/2020     Lab Results   Component Value Date    CHOL 160 11/11/2020     Lab Results   Component Value Date    HDL 65 (H) 11/11/2020    HDL 85 05/14/2020    HDL 75 (H) 10/17/2019     Lab Results   Component Value Date    LDL 66 11/11/2020     05/14/2020    LDL 91 10/17/2019     Lab Results   Component Value Date    TRIG 178 (H) 11/11/2020    TRIG 115 05/14/2020    TRIG 173 (H) 10/17/2019     Review and interpretation of imaging:  MR SCAN OF THE BRAIN WITHOUT CONTRAST     HISTORY: Dizziness. Right-sided headache. Fell 3 weeks ago.     TECHNIQUE: The MR scan was performed with sagittal and axial images  without contrast.      FINDINGS: There is prominent diffuse atrophy and chronic small vessel  ischemic change. There is no evidence of acute intracranial hemorrhage  or mass effect. The diffusion sequence shows no  evidence of acute  infarct.     There are normal flow voids in the major vessels. The sinuses and  mastoid air cells are clear.     CONCLUSION: Moderate diffuse atrophy and chronic small vessel ischemic  change. No evidence of acute infarct.     This report was finalized on 11/10/2020 6:11 PM by Dr. Venkata Napier M.D.   MR SCAN OF THE CERVICAL SPINE WITHOUT CONTRAST     HISTORY: Dizziness. Worsening confusion. Recent right temporal headache.     Paraparesis.      FINDINGS: The MR scan was performed with sagittal and axial images  without contrast and demonstrates the followin. The cervicomedullary junction is normal in position. The foramen  magnum appears normal. There is degenerative change involving the  odontoid and anterior arch of C1 with associated pannus formation  extending posteriorly and causing slight indentation of the anterior  margin of the spinal cord just below the level of the foramen magnum.  2. At C2-3 there is a slight central disc bulge. There is no central or  foraminal encroachment.  3. At C3-4 there is a moderate posterior disc osteophyte complex  partially effacing the anterior epidural space. There is no central or  foraminal encroachment.  4. At C4-5 there is a moderate posterior disc osteophyte complex  effacing the anterior epidural space and causing very slight flattening  deformity of the anterior margin of the spinal cord with slight central  stenosis. There is no foraminal encroachment.  5. At C5-6 there is a moderate posterior disc osteophyte complex  partially effacing the anterior posterior pleural space with some  minimal flattening of the anterior margin of the spinal cord and minimal  central stenosis. There is no foraminal encroachment.  6. At C6-7 there is a prominent posterior disc osteophyte complex  effacing the anterior epidural space and causing flattening deformity of  the anterior margin of the spinal cord with some associated central  stenosis. There is no  foraminal encroachment.  7. The C7-T1 level is unremarkable. The upper thoracic spine is  unremarkable as seen on the sagittal images.     This report was finalized on 11/10/2020 6:11 PM by Dr. Venkata Napier M.D.     MRI LEFT HIP WITHOUT CONTRAST     HISTORY: Hip pain and weakness after a fall. Leg weakness.     TECHNIQUE: MRI of the left hip was performed using protocol which shows  both hips in the axial plane and most of the pelvis in the coronal plane  using T1 and STIR sequences. A sagittal proton density sequence was made  through the left hip. There is no other imaging of the hip for  correlation.     FINDINGS: Marrow signal throughout the pelvis, proximal femurs, and the  L4 and L5 vertebrae is normal. There is no evidence of fracture or bone  lesion.     Coronal images show some degenerative change in the lumbar spine but the  lower lumbar canal appears grossly patent.     There is advanced osteoarthritic change at the left hip with complete  loss of articular cartilage. There is a small left hip effusion, small  subcortical cysts and minimal reactive subcortical marrow signal change.  Marginal osteophytes are also observed around the hip. However, as  already mentioned, there is no evidence of fracture or other acute  posttraumatic deformity around the left hip.     The muscles and tendons around the left hip and the left hemipelvis  appear normal. There is some edema in the right medial proximal thigh,  that appears to be in the adductor kisha muscle. This is consistent  with muscle strain. Musculature around the right hip and hemipelvis  otherwise appears normal. No intrapelvic lesion is identified.     IMPRESSION:  Advanced osteoarthritis at the left hip. No fracture or  other acute posttraumatic deformity is identified around the left hip or  the left hemipelvis. Incidentally noted is fairly diffuse edema and  musculature of the posterior medial right thigh, representing muscle  strain. This appears  to be in the adductor kisha.     This report was finalized on 11/10/2020 5:23 PM by Dr. Олег Lovett M.D.       Impression:  This patient is a 74-year-old female with HTN, mitral and tricuspid regurgitation, thyroid nodule, depression, and narcissistic personality disorder who presented 11/9 with complaint of generalized weakness and inability to walk.  Patient's daughter provided the history to Dr. Schwartz reporting a longstanding history of psychiatric issues related to personality disorder and somatoform complaints.  Patient stopped working as a [employer]  when the pandemic started.  Since that time the patient's had progressively worsening generalized weakness and difficulty walking he began using a walker about 3 months ago.  2 weeks ago she had a fall complaining of left hip pain.  Over the last month she is had worsening cognitive changes with disorientation and short-term memory impairment.  She was admitted to the hospital after she was unable to get up up off the toilet due to lower extremity weakness which is continued to worsen.  Patient also reports to me that she has had issues with urinary incontinence for approximately last 6 months.    Diagnosis:  Gait disturbance with cognitive decline, concern for possible NPH  Narcissistic personality disorder by history    Work-up:  · X-ray: No acute findings  · CT head: No acute findings.  Atrophy and small vessel disease noted.  · MRI brain without contrast: Moderate diffuse atrophy and chronic small vessel disease no acute findings per radiology, per Dr. Schwartz shows hydrocephalus with transependymal edema.  · MRI C-spine without contrast: Degenerative change at the odontoid and anterior arch of C1 with slight indentation of the spinal cord, slight flattening of the cord at C4-5, C5-6 and C6-7 with no mention of abnormal cord signal.  · Left hip MRI without contrast: Advanced osteoarthritis of the left hip, no fracture or posttraumatic deformity  however generally noted is fairly diffuse edema and musculature of the posterior medial right thigh representing muscle strain.  · Labs: Hemoglobin A1c 5.48%, TSH 3.15, LDL 66 B12 1056, UA unremarkable aside from trace ketones, negative for COVID-19  · CSF 1111: Total nucleated cells 1, protein 29, glucose 54, 87 RBC  · Status post LP 11/11 with removal of 30 cc CSF with documented improvement in gait per PT    Plan:  Discussed with Dr. Schwartz.  Will consult Dr Silverio for evaluation of NPH and sideration of shunt placement.  Continue neurochecks.  Will follow.

## 2020-11-13 ENCOUNTER — TRANSCRIBE ORDERS (OUTPATIENT)
Dept: SLEEP MEDICINE | Facility: HOSPITAL | Age: 75
End: 2020-11-13

## 2020-11-13 ENCOUNTER — APPOINTMENT (OUTPATIENT)
Dept: CT IMAGING | Facility: HOSPITAL | Age: 75
End: 2020-11-13

## 2020-11-13 DIAGNOSIS — Z01.818 OTHER SPECIFIED PRE-OPERATIVE EXAMINATION: Primary | ICD-10-CM

## 2020-11-13 PROBLEM — G91.2 NPH (NORMAL PRESSURE HYDROCEPHALUS) (HCC): Status: ACTIVE | Noted: 2020-11-13

## 2020-11-13 LAB
ANION GAP SERPL CALCULATED.3IONS-SCNC: 6.2 MMOL/L (ref 5–15)
BASOPHILS # BLD AUTO: 0.03 10*3/MM3 (ref 0–0.2)
BASOPHILS NFR BLD AUTO: 0.4 % (ref 0–1.5)
BUN SERPL-MCNC: 15 MG/DL (ref 8–23)
BUN/CREAT SERPL: 15.3 (ref 7–25)
CALCIUM SPEC-SCNC: 9.5 MG/DL (ref 8.6–10.5)
CHLORIDE SERPL-SCNC: 104 MMOL/L (ref 98–107)
CO2 SERPL-SCNC: 26.8 MMOL/L (ref 22–29)
CREAT SERPL-MCNC: 0.98 MG/DL (ref 0.57–1)
DEPRECATED RDW RBC AUTO: 42.2 FL (ref 37–54)
EOSINOPHIL # BLD AUTO: 0.14 10*3/MM3 (ref 0–0.4)
EOSINOPHIL NFR BLD AUTO: 1.7 % (ref 0.3–6.2)
ERYTHROCYTE [DISTWIDTH] IN BLOOD BY AUTOMATED COUNT: 13.5 % (ref 12.3–15.4)
GFR SERPL CREATININE-BSD FRML MDRD: 55 ML/MIN/1.73
GLUCOSE SERPL-MCNC: 94 MG/DL (ref 65–99)
HCT VFR BLD AUTO: 38.4 % (ref 34–46.6)
HGB BLD-MCNC: 12.9 G/DL (ref 12–15.9)
IMM GRANULOCYTES # BLD AUTO: 0.03 10*3/MM3 (ref 0–0.05)
IMM GRANULOCYTES NFR BLD AUTO: 0.4 % (ref 0–0.5)
LYMPHOCYTES # BLD AUTO: 1.82 10*3/MM3 (ref 0.7–3.1)
LYMPHOCYTES NFR BLD AUTO: 22.1 % (ref 19.6–45.3)
MCH RBC QN AUTO: 28.8 PG (ref 26.6–33)
MCHC RBC AUTO-ENTMCNC: 33.6 G/DL (ref 31.5–35.7)
MCV RBC AUTO: 85.7 FL (ref 79–97)
MONOCYTES # BLD AUTO: 0.42 10*3/MM3 (ref 0.1–0.9)
MONOCYTES NFR BLD AUTO: 5.1 % (ref 5–12)
NEUTROPHILS NFR BLD AUTO: 5.81 10*3/MM3 (ref 1.7–7)
NEUTROPHILS NFR BLD AUTO: 70.3 % (ref 42.7–76)
NRBC BLD AUTO-RTO: 0 /100 WBC (ref 0–0.2)
PLATELET # BLD AUTO: 236 10*3/MM3 (ref 140–450)
PMV BLD AUTO: 9.9 FL (ref 6–12)
POTASSIUM SERPL-SCNC: 4 MMOL/L (ref 3.5–5.2)
RBC # BLD AUTO: 4.48 10*6/MM3 (ref 3.77–5.28)
SODIUM SERPL-SCNC: 137 MMOL/L (ref 136–145)
WBC # BLD AUTO: 8.25 10*3/MM3 (ref 3.4–10.8)

## 2020-11-13 PROCEDURE — 99231 SBSQ HOSP IP/OBS SF/LOW 25: CPT | Performed by: NURSE PRACTITIONER

## 2020-11-13 PROCEDURE — 85025 COMPLETE CBC W/AUTO DIFF WBC: CPT | Performed by: HOSPITALIST

## 2020-11-13 PROCEDURE — 25010000002 HYDRALAZINE PER 20 MG: Performed by: HOSPITALIST

## 2020-11-13 PROCEDURE — 80048 BASIC METABOLIC PNL TOTAL CA: CPT | Performed by: HOSPITALIST

## 2020-11-13 PROCEDURE — 94799 UNLISTED PULMONARY SVC/PX: CPT

## 2020-11-13 PROCEDURE — 99233 SBSQ HOSP IP/OBS HIGH 50: CPT | Performed by: NURSE PRACTITIONER

## 2020-11-13 PROCEDURE — 70450 CT HEAD/BRAIN W/O DYE: CPT

## 2020-11-13 RX ORDER — HYDRALAZINE HYDROCHLORIDE 20 MG/ML
10 INJECTION INTRAMUSCULAR; INTRAVENOUS EVERY 4 HOURS PRN
Status: DISCONTINUED | OUTPATIENT
Start: 2020-11-13 | End: 2020-11-14

## 2020-11-13 RX ORDER — HYDRALAZINE HYDROCHLORIDE 50 MG/1
50 TABLET, FILM COATED ORAL EVERY 8 HOURS SCHEDULED
Status: DISCONTINUED | OUTPATIENT
Start: 2020-11-13 | End: 2020-11-14 | Stop reason: HOSPADM

## 2020-11-13 RX ADMIN — HYDRALAZINE HYDROCHLORIDE 10 MG: 20 INJECTION, SOLUTION INTRAMUSCULAR; INTRAVENOUS at 15:39

## 2020-11-13 RX ADMIN — AMLODIPINE BESYLATE 10 MG: 10 TABLET ORAL at 08:39

## 2020-11-13 RX ADMIN — METOPROLOL SUCCINATE 50 MG: 50 TABLET, EXTENDED RELEASE ORAL at 08:39

## 2020-11-13 RX ADMIN — HYDRALAZINE HYDROCHLORIDE 50 MG: 50 TABLET, FILM COATED ORAL at 22:02

## 2020-11-13 RX ADMIN — HYDRALAZINE HYDROCHLORIDE 50 MG: 50 TABLET, FILM COATED ORAL at 13:12

## 2020-11-13 RX ADMIN — SODIUM CHLORIDE, PRESERVATIVE FREE 10 ML: 5 INJECTION INTRAVENOUS at 22:03

## 2020-11-13 RX ADMIN — ALBUTEROL SULFATE 2.5 MG: 2.5 SOLUTION RESPIRATORY (INHALATION) at 10:31

## 2020-11-13 RX ADMIN — ALLOPURINOL 100 MG: 100 TABLET ORAL at 08:39

## 2020-11-13 RX ADMIN — HYDRALAZINE HYDROCHLORIDE 25 MG: 25 TABLET, FILM COATED ORAL at 09:11

## 2020-11-13 NOTE — PROGRESS NOTES
Continued Stay Note  [facility]     Patient Name: Mary Boyle  MRN: [id number]  Today's Date: 11/13/2020    Admit Date: 11/9/2020    Discharge Plan     Row Name 11/13/20 1012       Plan    Plan  [facility], pre-cert good through [facility].    Patient/Family in Agreement with Plan  yes    Plan Comments  Inbound call from Chanell/Sergio. Pre-cert is good through [facility]. Zuleika Pinto RN    Row Name 11/13/20 0933       Plan    Plan  [facility] when stable, cert obtained will likely need to be restarted.    Patient/Family in Agreement with Plan  yes    Plan Comments  Noted plans for  shunt placement. [facility] at DC and pre-cert was obtained yesterday but will likely need to be restarted closer to DC. Outbound call to jose guadalupe Presley and discussed DC plans. She is unsure when they will be doing the  shunt but she does want her to DC to [facility] when able. CCP will follow. Zuleika Pinto RN        Discharge Codes    No documentation.             Zuleika Pinto RN

## 2020-11-13 NOTE — PROGRESS NOTES
"Daily progress note    Chief complaint  Doing same  No new complaints  No more dizziness lightheadedness  Family at bedside    History of present illness  74 white female with history of asthma osteoarthritis depression hypertension and gastroesophageal reflux disease presented to [facility] emergency room with intermittent dizziness started yesterday with generalized weakness nausea but no vomiting diarrhea.  Patient also have headache for last 3 weeks.  Patient denies any focal weakness.  Patient work-up in ER not conclusive admit for further management.  Patient also denies any fever chills chest pain increased shortness of breath but looks very anxious.  Patient does have a history of personality disorder.      REVIEW OF SYSTEMS  Unremarkable except generalized weakness     PHYSICAL EXAM   Blood pressure (!) 183/96, pulse 76, temperature 97.8 °F (36.6 °C), temperature source Oral, resp. rate 16, height 147.3 cm (58\"), weight 54.4 kg (120 lb), SpO2 94 %, not currently breastfeeding.    GENERAL: Chronically ill-appearing, non-toxic appearing, not distressed  HENT: normocephalic, atraumatic  EYES: no scleral icterus, PERRL  CV: regular rhythm, regular rate, no murmur  RESPIRATORY: normal effort, CTAB  ABDOMEN: soft nontender bowel sounds positive  MUSCULOSKELETAL: no deformity  NEURO: alert, moves all extremities, follows commands, mental status normal/baseline  SKIN: warm, dry, no rash   Psych: Appropriate mood and affect    LAB RESULTS  Lab Results (last 24 hours)     Procedure Component Value Units Date/Time    Basic Metabolic Panel [[id number]]  (Abnormal) Collected: 11/13/20 0551    Specimen: Blood Updated: 11/13/20 0647     Glucose 94 mg/dL      BUN 15 mg/dL      Creatinine 0.98 mg/dL      Sodium 137 mmol/L      Potassium 4.0 mmol/L      Chloride 104 mmol/L      CO2 26.8 mmol/L      Calcium 9.5 mg/dL      eGFR Non African Amer 55 mL/min/1.73      BUN/Creatinine Ratio 15.3     Anion Gap 6.2 mmol/L    "    Narrative:      GFR Normal >60  Chronic Kidney Disease <60  Kidney Failure <15      CBC & Differential [[id number]]  (Normal) Collected: 11/13/20 0551    Specimen: Blood Updated: 11/13/20 0617    Narrative:      The following orders were created for panel order CBC & Differential.  Procedure                               Abnormality         Status                     ---------                               -----------         ------                     CBC Auto Differential[[id number]]        Normal              Final result                 Please view results for these tests on the individual orders.    CBC Auto Differential [[id number]]  (Normal) Collected: 11/13/20 0551    Specimen: Blood Updated: 11/13/20 0617     WBC 8.25 10*3/mm3      RBC 4.48 10*6/mm3      Hemoglobin 12.9 g/dL      Hematocrit 38.4 %      MCV 85.7 fL      MCH 28.8 pg      MCHC 33.6 g/dL      RDW 13.5 %      RDW-SD 42.2 fl      MPV 9.9 fL      Platelets 236 10*3/mm3      Neutrophil % 70.3 %      Lymphocyte % 22.1 %      Monocyte % 5.1 %      Eosinophil % 1.7 %      Basophil % 0.4 %      Immature Grans % 0.4 %      Neutrophils, Absolute 5.81 10*3/mm3      Lymphocytes, Absolute 1.82 10*3/mm3      Monocytes, Absolute 0.42 10*3/mm3      Eosinophils, Absolute 0.14 10*3/mm3      Basophils, Absolute 0.03 10*3/mm3      Immature Grans, Absolute 0.03 10*3/mm3      nRBC 0.0 /100 WBC     Protime-INR [[id number]]  (Normal) Collected: 11/12/20 1707    Specimen: Blood from Hand, Right Updated: 11/12/20 1746     Protime 13.2 Seconds      INR 1.01    aPTT [[id number]]  (Normal) Collected: 11/12/20 1707    Specimen: Blood from Hand, Right Updated: 11/12/20 1746     PTT 28.7 seconds         Imaging Results (Last 24 Hours)     Procedure Component Value Units Date/Time    CT Head Without Contrast [[id number]] Collected: 11/13/20 0544     Updated: 11/13/20 0552    Narrative:      CT HEAD WITHOUT CONTRAST:      HISTORY:   shunt placement.     TECHNIQUE:  Axial images  were obtained through the brain without  contrast administration. Multiplanar reformatted images were  reconstructed from the helical source data. Radiation dose reduction  techniques were utilized, including automated exposure control and  exposure modulation based on body size.        COMPARISON: Head CT 11/09/2020. MRI brain 11/10/2020     FINDINGS:   Stable diffuse ventriculomegaly that appears slightly out of proportion  to the overall degree of moderate cerebral volume loss. There is  moderate patchy hypodensity within the periventricular and subcortical  white matter, compatible with chronic microvascular ischemia. No  evidence of large territorial infarction. No intracranial hemorrhage. No  midline shift or evidence of brain herniation. Atherosclerotic  calcifications of the bilateral carotid siphons. Calvarium is intact.  The paranasal sinuses are clear.       Impression:      1.  Moderate ventriculomegaly which appears slightly out of proportion  to the overall degree of cerebral volume loss. Findings can be seen in  the setting of a communicating hydrocephalus such as normal pressure  hydrocephalus.  2.  Senescent changes.     This report was finalized on 11/13/2020 5:49 AM by Dr. Karlos Sky M.D.              ECG 12 Lead                 HEART RATE= 88  bpm  RR Interval= 644  ms  VA Interval= 191  ms  P Horizontal Axis= 32  deg  P Front Axis= 44  deg  QRSD Interval= 151  ms  QT Interval= 418  ms  QRS Axis= 4  deg  T Wave Axis= 121  deg  - ABNORMAL ECG -  Sinus rhythm  Left bundle branch block  NO SIGNIFICANT CHANGE FROM PREVIOUS ECG              Current Facility-Administered Medications:   •  acetaminophen (TYLENOL) tablet 650 mg, 650 mg, Oral, Q4H PRN, Austyn Calderon MD, 650 mg at 11/12/20 0846  •  albuterol (PROVENTIL) nebulizer solution 0.083% 2.5 mg/3mL, 2.5 mg, Nebulization, Q6H PRN, Austyn Calderon MD, 2.5 mg at 11/13/20 1031  •  allopurinol (ZYLOPRIM) tablet 100 mg, 100 mg, Oral, Daily, Kvng  MD Sarah, 100 mg at 11/13/20 0839  •  amLODIPine (NORVASC) tablet 10 mg, 10 mg, Oral, Q24H, Sarah Calderon MD, 10 mg at 11/13/20 0839  •  Hold medication, 1 each, Does not apply, Continuous PRN, Karen Barragan APRN  •  hydrALAZINE (APRESOLINE) tablet 25 mg, 25 mg, Oral, Q8H, Sarah Calderon MD, 25 mg at 11/13/20 0911  •  metoprolol succinate XL (TOPROL-XL) 24 hr tablet 50 mg, 50 mg, Oral, Q24H, Sarah Calderon MD, 50 mg at 11/13/20 0839  •  pantoprazole (PROTONIX) EC tablet 40 mg, 40 mg, Oral, Q AM, Sarah Calderon MD, Stopped at 11/13/20 0746  •  sodium chloride 0.9 % flush 10 mL, 10 mL, Intravenous, PRN, Chandler Umaña MD, 10 mL at 11/10/20 0851  •  sodium chloride 0.9 % flush 10 mL, 10 mL, Intravenous, PRN, Chandler Umaña MD, 10 mL at 11/09/20 214     ASSESSMENT  Probable normal pressure hydrocephalus  Hypertension with elevated blood pressure  Personality disorder  Anxiety disorder  Asthma  Gastroesophageal reflux disease    PLAN  CPM  Adjust blood pressure medication   shunt per neurosurgery  Neuro consult appreciated  Psych to follow patient  Adjust home medications  Stress ulcer DVT prophylaxis  Supportive care  PT OT  Discussed with nursing staff and family  Follow closely further recommendation current hospital course    SARAH CALDERON MD

## 2020-11-13 NOTE — CONSULTS
Inpatient General Surgery Consult  Consult performed by: Marco Nunes Jr., MD  Consult ordered by: Freya Ventura APRN          Patient Care Team:  Babar Jones MD as PCP - General  Babar Jones MD as PCP - Family Medicine    Chief complaint: Generalized weakness    Subjective     History of Present Illness     The patient is a pleasant 74-year-old female who has progressively declined over several months with fatigue, problems with ambulation, and cognitive impairment.  She was admitted based on an inability to get off the commode after using the bathroom.  Evaluation by neurosurgery suggest that a  shunt could provide improvement in her symptoms.  Request was made for general surgical assistance in the abdominal portion of the  shunt.    The patient is a poor historian but states that she has had a cholecystectomy, appendectomy, and hysterectomy.  The medical records indicate a hernia repair but she is not aware of the details.    Review of Systems   Constitutional: Positive for fatigue. Negative for fever.   Respiratory: Negative for chest tightness and shortness of breath.    Cardiovascular: Negative for chest pain and palpitations.   Gastrointestinal: Negative for abdominal pain, blood in stool, constipation, diarrhea, nausea and vomiting.        Past Medical History:   Diagnosis Date   • Arthritis    • Asthma    • Chest pain    • Depression    • Ganglion cyst     right leg   • GERD (gastroesophageal reflux disease)    • Gout    • Hiatal hernia    • Hypertension    • Hypokalemia    • Mild mitral regurgitation    • Thyroid nodule    • Tricuspid regurgitation    • Vitamin D deficiency    ,   Past Surgical History:   Procedure Laterality Date   • APPENDECTOMY     • BREAST LUMPECTOMY     • CHOLECYSTECTOMY     • GANGLION CYST EXCISION     • HERNIA REPAIR     • HYSTERECTOMY     ,   Family History   Problem Relation Age of Onset   • Heart attack Other    • Heart disease Other    •  "Hypertension Father    • Stroke Father    ,   Social History     Tobacco Use   • Smoking status: Former Smoker   • Smokeless tobacco: Never Used   • Tobacco comment: 13 years no smoking.    Substance Use Topics   • Alcohol use: No     Comment: \"15 years sobriety\"   • Drug use: No     E-cigarette/Vaping     E-cigarette/Vaping Substances     E-cigarette/Vaping Devices        and Allergies:  Iodinated diagnostic agents, Aspirin, Doxycycline, and Sertraline    Objective      Vital Signs  Temp:  [97.8 °F (36.6 °C)-99.2 °F (37.3 °C)] 99.2 °F (37.3 °C)  Heart Rate:  [80-91] 91  Resp:  [16-18] 18  BP: (161-198)/() 185/86    Physical Exam  Constitutional:       Appearance: Normal appearance. She is well-developed. She is not toxic-appearing.   Eyes:      General: No scleral icterus.  Pulmonary:      Effort: Pulmonary effort is normal. No respiratory distress.   Abdominal:      Palpations: Abdomen is soft.      Tenderness: There is no abdominal tenderness.      Comments: There is a Pfannenstiel incision and an apparent right lower quadrant incision.  Other incisions are difficult to determine but there appears to be no epigastric or right upper quadrant incision.   Skin:     General: Skin is warm and dry.   Neurological:      Mental Status: She is alert and oriented to person, place, and time.   Psychiatric:         Behavior: Behavior normal.         Thought Content: Thought content normal.         Judgment: Judgment normal.         Results Review:    I reviewed the patient's new clinical results.        Assessment/Plan       Generalized weakness      Assessment & Plan     1.  Generalized decline: There is suspicion this may be related to normal pressure hydrocephalus.  If a  shunt is planned, general surgery will be available to assist with the abdominal portion.    I discussed the patients findings and my recommendations with patient and nursing staff    Marco Nunes Jr., MD  11/12/20  19:52 EST          "
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
## 2020-11-13 NOTE — SIGNIFICANT NOTE
Spoke with RN who states since patient is currently NPO, to minimize activity today-cleared for chair transfer. Respiratory therapy currently with patient. Will attempt to see patient later if able.

## 2020-11-13 NOTE — DISCHARGE INSTRUCTIONS
Shunt surgery is scheduled for next Thursday November 19 at noon, patient will need to be at hospital at 10:00 am  with NPO after midnight.

## 2020-11-13 NOTE — PROGRESS NOTES
[facility] NEUROSURGERY PROGRESS NOTE    PATIENT IDENTIFICATION:   Name:  Mary Boyle      MRN:  [id number]     74 y.o.  female               CC: General Weakness      Subjective     Interval History:  Had LP shunt    ROS:  Constitutional: No fever, chills  HEENT: No headache, no vision changes  GI: No nausea, vomiting, no swallow difficulties  Neuro: No numbness, tingling, or weakness, no speech difficulties, balance is better, per dtr    Objective     Vital signs in last 24 hours:  Temp:  [96.5 °F (35.8 °C)-99.2 °F (37.3 °C)] 97.8 °F (36.6 °C)  Heart Rate:  [76-94] 76  Resp:  [16-18] 16  BP: (162-185)/() 183/96    Intake/Output this shift:  No intake/output data recorded.    Intake/Output last 3 shifts:  I/O last 3 completed shifts:  In: 1385.4 [P.O.:200; I.V.:1185.4]  Out: 1400 [Urine:1400]    LABS:  Results from last 7 days   Lab Units 11/13/20  0551 11/12/20  0548 11/11/20  0536   WBC 10*3/mm3 8.25 7.32 6.54   HEMOGLOBIN g/dL 12.9 12.8 10.9*   HEMATOCRIT % 38.4 38.3 33.7*   PLATELETS 10*3/mm3 236 227 202     Results from last 7 days   Lab Units 11/13/20 0551 11/12/20  0548 11/11/20  0536  11/09/20  1303   SODIUM mmol/L 137 141 141   < > 141   POTASSIUM mmol/L 4.0 3.8 3.8   < > 3.8   CHLORIDE mmol/L 104 105 109*   < > 102   CO2 mmol/L 26.8 24.5 23.5   < > 26.9   BUN mg/dL 15 13 16   < > 20   CREATININE mg/dL 0.98 0.85 1.09*   < > 1.16*   CALCIUM mg/dL 9.5 8.7 8.4*   < > 9.3   BILIRUBIN mg/dL  --   --  0.2  --  0.4   ALK PHOS U/L  --   --  59  --  59   ALT (SGPT) U/L  --   --  16  --  21   AST (SGOT) U/L  --   --  19  --  37*   GLUCOSE mg/dL 94 86 85   < > 87    < > = values in this interval not displayed.     Results from last 7 days   Lab Units 11/12/20  1707   INR  1.01         IMAGING STUDIES:  CT head w/o contrast reveals moderate ventriculomegaly which appears slighlty out of proportion to the overall degree of cerebral volume loss.  Findings can be seen in the setting of a communicating  hydrocephalus such as normal pressure hydrocephalus.       I personally viewed and interpreted the patient's CT head with Dr. Silverio.    Meds reviewed/changed: Yes    Current Facility-Administered Medications:   •  acetaminophen (TYLENOL) tablet 650 mg, 650 mg, Oral, Q4H PRN, Austyn Calderon MD, 650 mg at 11/12/20 0846  •  albuterol (PROVENTIL) nebulizer solution 0.083% 2.5 mg/3mL, 2.5 mg, Nebulization, Q6H PRN, Austyn Calderon MD, 2.5 mg at 11/13/20 1031  •  allopurinol (ZYLOPRIM) tablet 100 mg, 100 mg, Oral, Daily, Austyn Calderon MD, 100 mg at 11/13/20 0839  •  amLODIPine (NORVASC) tablet 10 mg, 10 mg, Oral, Q24H, Austyn Calderon MD, 10 mg at 11/13/20 0839  •  Hold medication, 1 each, Does not apply, Continuous PRN, Karen Barragan APRN  •  hydrALAZINE (APRESOLINE) injection 10 mg, 10 mg, Intravenous, Q4H PRN, Austyn Calderon MD  •  hydrALAZINE (APRESOLINE) tablet 50 mg, 50 mg, Oral, Q8H, Austyn Calderon MD, 50 mg at 11/13/20 1312  •  metoprolol succinate XL (TOPROL-XL) 24 hr tablet 50 mg, 50 mg, Oral, Q24H, Austyn Calderon MD, 50 mg at 11/13/20 0839  •  pantoprazole (PROTONIX) EC tablet 40 mg, 40 mg, Oral, Q AM, Austyn Calderon MD, Stopped at 11/13/20 0746  •  sodium chloride 0.9 % flush 10 mL, 10 mL, Intravenous, PRN, Chandler Umaña MD, 10 mL at 11/10/20 0851  •  sodium chloride 0.9 % flush 10 mL, 10 mL, Intravenous, PRN, Chandler Umaña MD, 10 mL at 11/09/20 0205      Physical Exam:    General:   Awake, alert, oriented x3. Speech clear with no aphasia  Neck:    Neck  CN III IV VI: Extraocular movements are full , PERRL   CN V: Normal facial sensation  CN VII: Facial movements are symmetric. No weakness.  Motor: Normal muscle strength, bulk and tone in upper and lower extremities.  No fasciculations, rigidity, spasticity, or abnormal movements.  Sensation: Normal to light touch; no extinction  Station and Gait: Still left sided trunk lean during sitting balance, and needs min/mod assistance to navigate FWW and walked approx  "13 feet, per PT notes.   Coordination:  Finger-to-nose and heel-to-shin test shows no dysmetria.    Extremities:   Wearing SCD    Assessment/Plan     ASSESSMENT:  Doing well, per dtr she is back to baseline since lumbar puncture.  Although, she continues to have some balance and gait issues.  Denies any h/a, N/V, or dizziness and is able to move all extremities well.        Generalized weakness    NPH (normal pressure hydrocephalus) (CMS/[facility])      PLAN:   Surgery is planned for the near future, was cancelled today r/t there is not an urgent need, at this time. LP did reduce the symptoms she was having with the increased confusion and balance issues r/t the hydrocephalus, which is a significant finding with NPH.  Patient can be discharged from a [facility] standpoint and we can readmit, when she is scheduled for the  Shunt placement.  Please feel free to call with any questions, or concerns.       I discussed the patient's findings and my recommendations with patient, family, nursing staff and Dr. Silverio.       LOS: 2 days       Dee Dee Moore, APRN  11/13/2020  13:37 EST    \"Dictated utilizing Dragon dictation\".      "

## 2020-11-13 NOTE — SIGNIFICANT NOTE
RN refused therapy session due to patient being NPO for sx today, wanted to minimize activity. Will attempt to follow 11/14 if able.

## 2020-11-13 NOTE — PROGRESS NOTES
"DOS: 2020  NAME: Mary Boyle   : 1945  PCP: Babar Jones MD  Chief Complaint   Patient presents with   • Dizziness     PT REPORTS VERTIGO STARTING THIS AM, PT WITH HX OF VERTIGO; PT WEARING FACE MASK     Patient seen in follow-up today; new to me          Subjective: No events overnight. She denied any complaints and/or concern on my exam. Patient NPO d/t potential  shunt placement later today.     Daughter at bedside.       Objective:  Vital signs: BP (!) 183/96 (BP Location: Left arm, Patient Position: Lying)   Pulse 76   Temp 97.8 °F (36.6 °C) (Oral)   Resp 16   Ht 147.3 cm (58\")   Wt 54.4 kg (120 lb)   SpO2 94%   BMI 25.08 kg/m²       General appearance: Well developed, well nourished,  alert and cooperative. BMI 25.08  HEENT: Normocephalic.   Neck and spine: Normal range of motion. Normal alignment. No mass or tenderness.    Cardiac: Regular rate and rhythm. No murmurs.   Peripheral Vasculature: Radial pulses are equal and symmetric.  Chest Exam: Clear to auscultation bilaterally, no wheezes, no rhonchi.  Extremities: Normal, no edema.   Skin: No rashes or birthmarks.      Higher integrative function: Oriented to location, daughter month, year. Speech fluent w/o dysarthria.  No obvious thought disorder, hallucinations, or delusions.  CN II: Normal visual fields.   CN III IV VI: Extraocular movements are full without nystagmus. Pupils are equal, round, and reactive to light.    CN V: Normal facial sensation.  CN VII: Facial movements are symmetric, no weakness.   CN VIII: Auditory acuity is normal.   CN IX & X: Symmetric palatal movement.   CN XI: Sternocleidomastoid and trapezius are normal. No weakness.   CN XII: The tongue is midline.   Motor: Muscle strength 5/5 in bilateral upper extremities right lower extremity, 4/5 in left lower extremity.  Increased tone in bilateral lower extremities.  Sensation: Light touch intact in all extremities.  Muscle stretch reflexes: Toes " down going   Coordination:Normal (finger to nose)      Laboratory results:  Lab Results   Component Value Date    GLUCOSE 94 11/13/2020    CALCIUM 9.5 11/13/2020     11/13/2020    K 4.0 11/13/2020    CO2 26.8 11/13/2020     11/13/2020    BUN 15 11/13/2020    CREATININE 0.98 11/13/2020    EGFRIFAFRI 62 10/17/2019    EGFRIFNONA 55 (L) 11/13/2020    BCR 15.3 11/13/2020    ANIONGAP 6.2 11/13/2020     Lab Results   Component Value Date    WBC 8.25 11/13/2020    HGB 12.9 11/13/2020    HCT 38.4 11/13/2020    MCV 85.7 11/13/2020     11/13/2020     Lab Results   Component Value Date    CHOL 160 11/11/2020     Lab Results   Component Value Date    HDL 65 (H) 11/11/2020    HDL 85 05/14/2020    HDL 75 (H) 10/17/2019     Lab Results   Component Value Date    LDL 66 11/11/2020     05/14/2020    LDL 91 10/17/2019     Lab Results   Component Value Date    TRIG 178 (H) 11/11/2020    TRIG 115 05/14/2020    TRIG 173 (H) 10/17/2019     Review and interpretation of imaging:  MR SCAN OF THE BRAIN WITHOUT CONTRAST     HISTORY: Dizziness. Right-sided headache. Fell 3 weeks ago.     TECHNIQUE: The MR scan was performed with sagittal and axial images  without contrast.      FINDINGS: There is prominent diffuse atrophy and chronic small vessel  ischemic change. There is no evidence of acute intracranial hemorrhage  or mass effect. The diffusion sequence shows no evidence of acute  infarct.     There are normal flow voids in the major vessels. The sinuses and  mastoid air cells are clear.     CONCLUSION: Moderate diffuse atrophy and chronic small vessel ischemic  change. No evidence of acute infarct.     This report was finalized on 11/10/2020 6:11 PM by Dr. Venkata Napier M.D.     MR SCAN OF THE CERVICAL SPINE WITHOUT CONTRAST     HISTORY: Dizziness. Worsening confusion. Recent right temporal headache.     Paraparesis.      FINDINGS: The MR scan was performed with sagittal and axial images  without contrast and  demonstrates the followin. The cervicomedullary junction is normal in position. The foramen  magnum appears normal. There is degenerative change involving the  odontoid and anterior arch of C1 with associated pannus formation  extending posteriorly and causing slight indentation of the anterior  margin of the spinal cord just below the level of the foramen magnum.  2. At C2-3 there is a slight central disc bulge. There is no central or  foraminal encroachment.  3. At C3-4 there is a moderate posterior disc osteophyte complex  partially effacing the anterior epidural space. There is no central or  foraminal encroachment.  4. At C4-5 there is a moderate posterior disc osteophyte complex  effacing the anterior epidural space and causing very slight flattening  deformity of the anterior margin of the spinal cord with slight central  stenosis. There is no foraminal encroachment.  5. At C5-6 there is a moderate posterior disc osteophyte complex  partially effacing the anterior posterior pleural space with some  minimal flattening of the anterior margin of the spinal cord and minimal  central stenosis. There is no foraminal encroachment.  6. At C6-7 there is a prominent posterior disc osteophyte complex  effacing the anterior epidural space and causing flattening deformity of  the anterior margin of the spinal cord with some associated central  stenosis. There is no foraminal encroachment.  7. The C7-T1 level is unremarkable. The upper thoracic spine is  unremarkable as seen on the sagittal images.     This report was finalized on 11/10/2020 6:11 PM by Dr. Venkata Napier M.D.     MRI LEFT HIP WITHOUT CONTRAST     HISTORY: Hip pain and weakness after a fall. Leg weakness.     TECHNIQUE: MRI of the left hip was performed using protocol which shows  both hips in the axial plane and most of the pelvis in the coronal plane  using T1 and STIR sequences. A sagittal proton density sequence was made  through the left hip.  There is no other imaging of the hip for  correlation.     FINDINGS: Marrow signal throughout the pelvis, proximal femurs, and the  L4 and L5 vertebrae is normal. There is no evidence of fracture or bone  lesion.     Coronal images show some degenerative change in the lumbar spine but the  lower lumbar canal appears grossly patent.     There is advanced osteoarthritic change at the left hip with complete  loss of articular cartilage. There is a small left hip effusion, small  subcortical cysts and minimal reactive subcortical marrow signal change.  Marginal osteophytes are also observed around the hip. However, as  already mentioned, there is no evidence of fracture or other acute  posttraumatic deformity around the left hip.     The muscles and tendons around the left hip and the left hemipelvis  appear normal. There is some edema in the right medial proximal thigh,  that appears to be in the adductor kisha muscle. This is consistent  with muscle strain. Musculature around the right hip and hemipelvis  otherwise appears normal. No intrapelvic lesion is identified.     IMPRESSION:  Advanced osteoarthritis at the left hip. No fracture or  other acute posttraumatic deformity is identified around the left hip or  the left hemipelvis. Incidentally noted is fairly diffuse edema and  musculature of the posterior medial right thigh, representing muscle  strain. This appears to be in the adductor kisha.     This report was finalized on 11/10/2020 5:23 PM by Dr. Олег Lovett M.D.     HISTORY: Weakness, dizziness and shortness of breath     COMPARISON: 07/01/2018     1 view(s) obtained.      FINDINGS: Lung fields are clear. Heart size stable. No evidence of  pneumothorax. No acute osseous abnormality.     IMPRESSION:  No acute findings     This report was finalized on 11/9/2020 1:45 PM by Dr. Peter Hale M.D.    Impression: This is a 74-year-old female with history of mitral and tricuspid regurgitation, thyroid  nodule, depression, hypertension, narcissistic personality disorder who presented to [facility] on 11/19 due to complaint of generalized weakness and inability to walk for which our service was consulted.  Chart review reflects that daughter reported longstanding history of psychiatric issues related to personality disorder and somatoform complaints.  Patient was working as a [employer]  prior to the start of the pandemic and recently retired.  Since that time, patient has had reported progressive worsening of generalized weakness and difficulty walking requiring use of a walker for approximately 3 months.  2 weeks ago patient had a fall resulting in left hip pain/discomfort.  Over the past 1 month patient's has had worsening changes in cognition with disorientation and impaired short-term memory.  She was admitted to the hospital after being unable to get up off the toilet and due to lower extremity weakness.  Lastly patient has experienced some urinary incontinence over the past 6 months; no prior history.    Neurologically, stable; unchanged.  Work-up to date below.       Diagnosis:  1.  Gait disturbance with cognitive decline; etiology likely NPH as patient improved after lumbar puncture-planned  shunt placement later today or tomorrow  2.  History of narcissistic personality disorder    Work-up:  · X-ray: No acute findings  · CT head: No acute findings.  Atrophy and small vessel disease noted.  · MRI brain without contrast: Moderate diffuse atrophy and chronic small vessel disease no acute findings per radiology, per Dr. Schwartz shows hydrocephalus with transependymal edema.  · MRI C-spine without contrast: Degenerative change at the odontoid and anterior arch of C1 with slight indentation of the spinal cord, slight flattening of the cord at C4-5, C5-6 and C6-7 with no mention of abnormal cord signal.  · Left hip MRI without contrast: Advanced osteoarthritis of the left hip, no fracture or  posttraumatic deformity however generally noted is fairly diffuse edema and musculature of the posterior medial right thigh representing muscle strain.  · CT head 11/13: Shows moderate ventriculomegaly with slightly out of proportion cerebral volume loss.  Senescent changes.  · Labs: Hemoglobin A1c 5.48%, TSH 3.15, LDL 66 B12 1056, UA unremarkable aside from trace ketones, negative for COVID-19  · CSF 1111: Total nucleated cells 1, protein 29, glucose 54, 87 RBC  · Status post LP 11/11 with removal of 30 cc CSF with documented improvement in gait per PT         Plan:  Impending  shunt placed per neurosurgery team  Continue neuro checks  PT/OT/ST. CCP to assist with discharge planning. Call RRT for any acute neurological changes and/or concerns. We will continue to follow and advise.       Case reviewed with attending Dr. Abraham Gonzales and he agrees with plan above    JAMES Black

## 2020-11-13 NOTE — SIGNIFICANT NOTE
11/13/20 1048   OTHER   Discipline occupational therapist   Rehab Time/Intention   Session Not Performed other (see comments)  (Nsg refused, stated going to sx today.)   Recommendation   OT - Next Appointment 11/16/20

## 2020-11-13 NOTE — PROGRESS NOTES
Continued Stay Note  [facility]     Patient Name: Mary Boyle  MRN: [id number]  Today's Date: 11/13/2020    Admit Date: 11/9/2020    Discharge Plan     Row Name 11/13/20 0933       Plan    Plan  [facility] when stable, cert obtained will likely need to be restarted.    Patient/Family in Agreement with Plan  yes    Plan Comments  Noted plans for  shunt placement. [facility] at DC and pre-cert was obtained yesterday but will likely need to be restarted closer to DC. Outbound call to jose guadalupe Presley and discussed DC plans. She is unsure when they will be doing the  shunt but she does want her to DC to [facility] when able. CCP will follow. Zuleika Pinto RN        Discharge Codes    No documentation.             Zuleika Pinto RN

## 2020-11-14 VITALS
BODY MASS INDEX: 25.19 KG/M2 | OXYGEN SATURATION: 97 % | RESPIRATION RATE: 16 BRPM | SYSTOLIC BLOOD PRESSURE: 139 MMHG | HEIGHT: 58 IN | HEART RATE: 92 BPM | DIASTOLIC BLOOD PRESSURE: 78 MMHG | WEIGHT: 120 LBS | TEMPERATURE: 98.3 F

## 2020-11-14 LAB
ANION GAP SERPL CALCULATED.3IONS-SCNC: 14.2 MMOL/L (ref 5–15)
BASOPHILS # BLD AUTO: 0.01 10*3/MM3 (ref 0–0.2)
BASOPHILS NFR BLD AUTO: 0.1 % (ref 0–1.5)
BUN SERPL-MCNC: 27 MG/DL (ref 8–23)
BUN/CREAT SERPL: 20.6 (ref 7–25)
CALCIUM SPEC-SCNC: 9.6 MG/DL (ref 8.6–10.5)
CHLORIDE SERPL-SCNC: 98 MMOL/L (ref 98–107)
CO2 SERPL-SCNC: 21.8 MMOL/L (ref 22–29)
CREAT SERPL-MCNC: 1.31 MG/DL (ref 0.57–1)
DEPRECATED RDW RBC AUTO: 42.9 FL (ref 37–54)
EOSINOPHIL # BLD AUTO: 0 10*3/MM3 (ref 0–0.4)
EOSINOPHIL NFR BLD AUTO: 0 % (ref 0.3–6.2)
ERYTHROCYTE [DISTWIDTH] IN BLOOD BY AUTOMATED COUNT: 13.8 % (ref 12.3–15.4)
GFR SERPL CREATININE-BSD FRML MDRD: 40 ML/MIN/1.73
GLUCOSE SERPL-MCNC: 112 MG/DL (ref 65–99)
HCT VFR BLD AUTO: 40.6 % (ref 34–46.6)
HGB BLD-MCNC: 13.4 G/DL (ref 12–15.9)
IMM GRANULOCYTES # BLD AUTO: 0.05 10*3/MM3 (ref 0–0.05)
IMM GRANULOCYTES NFR BLD AUTO: 0.4 % (ref 0–0.5)
LYMPHOCYTES # BLD AUTO: 1.27 10*3/MM3 (ref 0.7–3.1)
LYMPHOCYTES NFR BLD AUTO: 10.9 % (ref 19.6–45.3)
MCH RBC QN AUTO: 28.3 PG (ref 26.6–33)
MCHC RBC AUTO-ENTMCNC: 33 G/DL (ref 31.5–35.7)
MCV RBC AUTO: 85.8 FL (ref 79–97)
MONOCYTES # BLD AUTO: 0.29 10*3/MM3 (ref 0.1–0.9)
MONOCYTES NFR BLD AUTO: 2.5 % (ref 5–12)
NEUTROPHILS NFR BLD AUTO: 86.1 % (ref 42.7–76)
NEUTROPHILS NFR BLD AUTO: 9.99 10*3/MM3 (ref 1.7–7)
NRBC BLD AUTO-RTO: 0 /100 WBC (ref 0–0.2)
PLATELET # BLD AUTO: 326 10*3/MM3 (ref 140–450)
PMV BLD AUTO: 10.5 FL (ref 6–12)
POTASSIUM SERPL-SCNC: 4.2 MMOL/L (ref 3.5–5.2)
RBC # BLD AUTO: 4.73 10*6/MM3 (ref 3.77–5.28)
SODIUM SERPL-SCNC: 134 MMOL/L (ref 136–145)
WBC # BLD AUTO: 11.61 10*3/MM3 (ref 3.4–10.8)

## 2020-11-14 PROCEDURE — 80048 BASIC METABOLIC PNL TOTAL CA: CPT | Performed by: HOSPITALIST

## 2020-11-14 PROCEDURE — 85025 COMPLETE CBC W/AUTO DIFF WBC: CPT | Performed by: HOSPITALIST

## 2020-11-14 PROCEDURE — 99232 SBSQ HOSP IP/OBS MODERATE 35: CPT | Performed by: NURSE PRACTITIONER

## 2020-11-14 RX ORDER — AMLODIPINE BESYLATE 10 MG/1
10 TABLET ORAL
Qty: 30 TABLET | Refills: 0 | Status: SHIPPED | OUTPATIENT
Start: 2020-11-15 | End: 2020-12-15

## 2020-11-14 RX ORDER — PANTOPRAZOLE SODIUM 40 MG/1
40 TABLET, DELAYED RELEASE ORAL DAILY
Qty: 30 TABLET | Refills: 0 | Status: SHIPPED | OUTPATIENT
Start: 2020-11-14 | End: 2020-12-14

## 2020-11-14 RX ORDER — HYDRALAZINE HYDROCHLORIDE 50 MG/1
50 TABLET, FILM COATED ORAL EVERY 8 HOURS SCHEDULED
Qty: 90 TABLET | Refills: 0 | Status: SHIPPED | OUTPATIENT
Start: 2020-11-14 | End: 2021-01-08

## 2020-11-14 RX ADMIN — HYDRALAZINE HYDROCHLORIDE 50 MG: 50 TABLET, FILM COATED ORAL at 15:08

## 2020-11-14 RX ADMIN — PANTOPRAZOLE SODIUM 40 MG: 40 TABLET, DELAYED RELEASE ORAL at 06:23

## 2020-11-14 RX ADMIN — ALLOPURINOL 100 MG: 100 TABLET ORAL at 10:33

## 2020-11-14 RX ADMIN — METOPROLOL SUCCINATE 50 MG: 50 TABLET, EXTENDED RELEASE ORAL at 10:33

## 2020-11-14 RX ADMIN — AMLODIPINE BESYLATE 10 MG: 10 TABLET ORAL at 10:33

## 2020-11-14 RX ADMIN — HYDRALAZINE HYDROCHLORIDE 50 MG: 50 TABLET, FILM COATED ORAL at 06:24

## 2020-11-14 NOTE — PROGRESS NOTES
"Daily progress note    Chief complaint  Doing same  No new complaints  Family at bedside    History of present illness  74 white female with history of asthma osteoarthritis depression hypertension and gastroesophageal reflux disease presented to [facility] emergency room with intermittent dizziness started yesterday with generalized weakness nausea but no vomiting diarrhea.  Patient also have headache for last 3 weeks.  Patient denies any focal weakness.  Patient work-up in ER not conclusive admit for further management.  Patient also denies any fever chills chest pain increased shortness of breath but looks very anxious.  Patient does have a history of personality disorder.      REVIEW OF SYSTEMS  Unremarkable except generalized weakness     PHYSICAL EXAM   Blood pressure 139/78, pulse 92, temperature 98.3 °F (36.8 °C), temperature source Oral, resp. rate 16, height 147.3 cm (58\"), weight 54.4 kg (120 lb), SpO2 97 %, not currently breastfeeding.    GENERAL: Chronically ill-appearing, non-toxic appearing, not distressed  HENT: normocephalic, atraumatic  EYES: no scleral icterus, PERRL  CV: regular rhythm, regular rate, no murmur  RESPIRATORY: normal effort, CTAB  ABDOMEN: soft nontender bowel sounds positive  MUSCULOSKELETAL: no deformity  NEURO: alert, moves all extremities, follows commands, mental status normal/baseline  SKIN: warm, dry, no rash   Psych: Appropriate mood and affect    LAB RESULTS  Lab Results (last 24 hours)     Procedure Component Value Units Date/Time    Basic Metabolic Panel [[id number]]  (Abnormal) Collected: 11/14/20 0450    Specimen: Blood Updated: 11/14/20 0655     Glucose 112 mg/dL      BUN 27 mg/dL      Creatinine 1.31 mg/dL      Sodium 134 mmol/L      Potassium 4.2 mmol/L      Chloride 98 mmol/L      CO2 21.8 mmol/L      Calcium 9.6 mg/dL      eGFR Non African Amer 40 mL/min/1.73      BUN/Creatinine Ratio 20.6     Anion Gap 14.2 mmol/L     Narrative:      GFR Normal " >60  Chronic Kidney Disease <60  Kidney Failure <15      CBC & Differential [[id number]]  (Abnormal) Collected: 11/14/20 0450    Specimen: Blood Updated: 11/14/20 0554    Narrative:      The following orders were created for panel order CBC & Differential.  Procedure                               Abnormality         Status                     ---------                               -----------         ------                     CBC Auto Differential[[id number]]        Abnormal            Final result                 Please view results for these tests on the individual orders.    CBC Auto Differential [[id number]]  (Abnormal) Collected: 11/14/20 0450    Specimen: Blood Updated: 11/14/20 0554     WBC 11.61 10*3/mm3      RBC 4.73 10*6/mm3      Hemoglobin 13.4 g/dL      Hematocrit 40.6 %      MCV 85.8 fL      MCH 28.3 pg      MCHC 33.0 g/dL      RDW 13.8 %      RDW-SD 42.9 fl      MPV 10.5 fL      Platelets 326 10*3/mm3      Neutrophil % 86.1 %      Lymphocyte % 10.9 %      Monocyte % 2.5 %      Eosinophil % 0.0 %      Basophil % 0.1 %      Immature Grans % 0.4 %      Neutrophils, Absolute 9.99 10*3/mm3      Lymphocytes, Absolute 1.27 10*3/mm3      Monocytes, Absolute 0.29 10*3/mm3      Eosinophils, Absolute 0.00 10*3/mm3      Basophils, Absolute 0.01 10*3/mm3      Immature Grans, Absolute 0.05 10*3/mm3      nRBC 0.0 /100 WBC         Imaging Results (Last 24 Hours)     ** No results found for the last 24 hours. **           ECG 12 Lead                 HEART RATE= 88  bpm  RR Interval= 644  ms  DC Interval= 191  ms  P Horizontal Axis= 32  deg  P Front Axis= 44  deg  QRSD Interval= 151  ms  QT Interval= 418  ms  QRS Axis= 4  deg  T Wave Axis= 121  deg  - ABNORMAL ECG -  Sinus rhythm  Left bundle branch block  NO SIGNIFICANT CHANGE FROM PREVIOUS ECG              Current Facility-Administered Medications:   •  acetaminophen (TYLENOL) tablet 650 mg, 650 mg, Oral, Q4H PRN, Austyn Calderon MD, 650 mg at 11/12/20 0846  •  albuterol  (PROVENTIL) nebulizer solution 0.083% 2.5 mg/3mL, 2.5 mg, Nebulization, Q6H PRN, Sarah Calderon MD, 2.5 mg at 11/13/20 1031  •  allopurinol (ZYLOPRIM) tablet 100 mg, 100 mg, Oral, Daily, Sarah Calderon MD, 100 mg at 11/14/20 1033  •  amLODIPine (NORVASC) tablet 10 mg, 10 mg, Oral, Q24H, Sarah Calderon MD, 10 mg at 11/14/20 1033  •  Hold medication, 1 each, Does not apply, Continuous PRN, Karen Barragan APRN  •  hydrALAZINE (APRESOLINE) injection 10 mg, 10 mg, Intravenous, Q4H PRN, Sarah Calderon MD, 10 mg at 11/13/20 1539  •  hydrALAZINE (APRESOLINE) tablet 50 mg, 50 mg, Oral, Q8H, Sarah Calderon MD, 50 mg at 11/14/20 0624  •  metoprolol succinate XL (TOPROL-XL) 24 hr tablet 50 mg, 50 mg, Oral, Q24H, Sarah Calderon MD, 50 mg at 11/14/20 1033  •  pantoprazole (PROTONIX) EC tablet 40 mg, 40 mg, Oral, Q AM, Sarah Calderon MD, 40 mg at 11/14/20 0623  •  sodium chloride 0.9 % flush 10 mL, 10 mL, Intravenous, PRN, Chandler Umaña MD, 10 mL at 11/10/20 0851  •  sodium chloride 0.9 % flush 10 mL, 10 mL, Intravenous, PRN, Chandler Umaña MD, 10 mL at 11/13/20 2203     ASSESSMENT  Probable normal pressure hydrocephalus  Hypertension with elevated blood pressure  Personality disorder  Anxiety disorder  Asthma  Gastroesophageal reflux disease    PLAN  Discharge to subacute rehab with outpatient  shunt placement  Discharge summary dictated    SARAH CALDERON MD

## 2020-11-14 NOTE — PROGRESS NOTES
DOS: 2020  NAME: Mary Boyle   : 1945  PCP: Babar Jones MD    Chief Complaint   Patient presents with   • Dizziness     PT REPORTS VERTIGO STARTING THIS AM, PT WITH HX OF VERTIGO; PT WEARING FACE MASK        Stroke    Subjective: No acute events overnight. Doing well lying in bed.  Oriented x3 and continues to show improvement.  Follows all commands appropriately.  No family at bedside.  Denies any new weakness, numbness, speech or visual disturbances, or headaches.    Objective:  Vital signs:      Vitals:    20 1554 20 1700 20 2031 20 0451   BP: 158/82 171/86 160/88 176/94   BP Location: Left arm Right arm Right arm Right arm   Patient Position: Lying Lying Lying Lying   Pulse: 96 99 94 89   Resp:  16 16 16   Temp:  98.9 °F (37.2 °C) 98.7 °F (37.1 °C) 97.5 °F (36.4 °C)   TempSrc:  Oral Oral Oral   SpO2: 96% 94% 95% 96%   Weight:       Height:           Current Facility-Administered Medications:   •  acetaminophen (TYLENOL) tablet 650 mg, 650 mg, Oral, Q4H PRN, Austyn Calderon MD, 650 mg at 20 0846  •  albuterol (PROVENTIL) nebulizer solution 0.083% 2.5 mg/3mL, 2.5 mg, Nebulization, Q6H PRN, Austyn Calderon MD, 2.5 mg at 20 1031  •  allopurinol (ZYLOPRIM) tablet 100 mg, 100 mg, Oral, Daily, Austyn Calderon MD, 100 mg at 20 0839  •  amLODIPine (NORVASC) tablet 10 mg, 10 mg, Oral, Q24H, Austyn Calderon MD, 10 mg at 20 0839  •  Hold medication, 1 each, Does not apply, Continuous PRN, Karen Barragan, APRN  •  hydrALAZINE (APRESOLINE) injection 10 mg, 10 mg, Intravenous, Q4H PRN, Austyn Calderon MD, 10 mg at 20 1539  •  hydrALAZINE (APRESOLINE) tablet 50 mg, 50 mg, Oral, Q8H, Austyn Calderon MD, 50 mg at 20 0624  •  metoprolol succinate XL (TOPROL-XL) 24 hr tablet 50 mg, 50 mg, Oral, Q24H, Austyn Calderon MD, 50 mg at 20 0839  •  pantoprazole (PROTONIX) EC tablet 40 mg, 40 mg, Oral, Q AM, Austyn Calderon MD, 40 mg at 20 0623  •  sodium  chloride 0.9 % flush 10 mL, 10 mL, Intravenous, PRN, Chandler Umaña MD, 10 mL at 11/10/20 0851  •  sodium chloride 0.9 % flush 10 mL, 10 mL, Intravenous, PRN, Chandler Umaña MD, 10 mL at 11/13/20 2203    PRN meds  •  acetaminophen  •  albuterol  •  hold  •  hydrALAZINE  •  sodium chloride  •  sodium chloride    No current facility-administered medications on file prior to encounter.      Current Outpatient Medications on File Prior to Encounter   Medication Sig   • albuterol (VENTOLIN HFA) 108 (90 BASE) MCG/ACT inhaler 2 puffs Every 4 (Four) Hours As Needed.   • allopurinol (ZYLOPRIM) 100 MG tablet Take 1 tablet by mouth Daily.   • meclizine (ANTIVERT) 25 MG tablet Take 1 tablet by mouth 3 (Three) Times a Day As Needed for dizziness.   • metoprolol succinate XL (TOPROL-XL) 25 MG 24 hr tablet 50 mg Daily.   • ipratropium-albuterol (DUO-NEB) 0.5-2.5 mg/mL nebulizer 2 sprays into each nostril daily       General appearance: Frail elderly female, appears older than stated age, NAD, alert and cooperative, well groomed  HEENT: Normocephalic, atraumatic, PERRL, no masses or tenderness  COR: RRR  Resp: Even and unlabored  Extremities: No obvious edema  Skin: warm, dry    Neurological:   MS: oriented to person, place, year, and month, recent/remote memory impaired, decreased attention/concentration, language intact, no neglect  CN: visual fields full, PERRL, EOMI, facial sensation equal, no facial droop, hearing symmetric, palate elevates symmetrically, shoulder shrug equal, tongue midline  Motor: 5/5 in upper extremities, 4/5 BLE, normal tone  Reflexes: down going toes  Sensory: light touch sensation intact in all 4 ext.  Rapid alternating movements: normal finger to thumb tap    Laboratory results:  Lab Results   Component Value Date    TSH 3.150 11/11/2020     Lab Results   Component Value Date    HGBA1C 5.48 11/11/2020     Lab Results   Component Value Date    BAAHKDHN85 1,056 (H) 11/05/2020     Lab Results    Component Value Date    CHOL 160 11/11/2020    CHLPL 208 (H) 05/14/2020    CHLPL 201 (H) 10/17/2019    CHLPL 183 04/10/2019     Lab Results   Component Value Date    TRIG 178 (H) 11/11/2020    TRIG 115 05/14/2020    TRIG 173 (H) 10/17/2019     Lab Results   Component Value Date    HDL 65 (H) 11/11/2020    HDL 85 05/14/2020    HDL 75 (H) 10/17/2019     Lab Results   Component Value Date    LDL 66 11/11/2020     05/14/2020    LDL 91 10/17/2019     Lab Results   Component Value Date    WBC 11.61 (H) 11/14/2020    HGB 13.4 11/14/2020    HCT 40.6 11/14/2020    MCV 85.8 11/14/2020     11/14/2020     Lab Results   Component Value Date    GLUCOSE 112 (H) 11/14/2020    BUN 27 (H) 11/14/2020    CREATININE 1.31 (H) 11/14/2020    EGFRIFNONA 40 (L) 11/14/2020    EGFRIFAFRI 62 10/17/2019    BCR 20.6 11/14/2020    K 4.2 11/14/2020    CO2 21.8 (L) 11/14/2020    CALCIUM 9.6 11/14/2020    PROTENTOTREF 6.5 10/17/2019    ALBUMIN 3.40 (L) 11/11/2020    LABIL2 1.3 11/05/2020    AST 19 11/11/2020    ALT 16 11/11/2020     Lab Results   Component Value Date    PTT 28.7 11/12/2020     Lab Results   Component Value Date    INR 1.01 11/12/2020    INR 1.1 11/09/2015    PROTIME 13.2 11/12/2020    PROTIME 13.5 11/09/2015     Brief Urine Lab Results  (Last result in the past 365 days)      Color   Clarity   Blood   Leuk Est   Nitrite   Protein   CREAT   Urine HCG        11/09/20 1302 Yellow Clear Negative Negative Negative Negative               Review and interpretation of imaging:  Ct Head Without Contrast    Result Date: 11/13/2020  1.  Moderate ventriculomegaly which appears slightly out of proportion to the overall degree of cerebral volume loss. Findings can be seen in the setting of a communicating hydrocephalus such as normal pressure hydrocephalus. 2.  Senescent changes.  This report was finalized on 11/13/2020 5:49 AM by Dr. Karlos Sky M.D.      Ct Head Without Contrast    Result Date: 11/9/2020  1. Atrophy and small  vessel white matter ischemic disease. 2. No acute intracranial process identified.  Radiation dose reduction techniques were utilized, including automated exposure control and exposure modulation based on body size.  This report was finalized on 11/9/2020 8:27 PM by Dr. Kosta Ramírez M.D.      Xr Chest 1 View    Result Date: 11/9/2020  No acute findings  This report was finalized on 11/9/2020 1:45 PM by Dr. Peter Hale M.D.      Mri Hip Left Without Contrast    Result Date: 11/10/2020  Advanced osteoarthritis at the left hip. No fracture or other acute posttraumatic deformity is identified around the left hip or the left hemipelvis. Incidentally noted is fairly diffuse edema and musculature of the posterior medial right thigh, representing muscle strain. This appears to be in the adductor kisha.  This report was finalized on 11/10/2020 5:23 PM by Dr. Олег Lovett M.D.      Results for orders placed in visit on 08/12/19   Adult Transthoracic Echo Complete W/ Cont if Necessary Per Protocol    Narrative · Calculated EF = 64%.  · There is no evidence of pericardial effusion.          Impression/Assessment:  This is a 74-year-old female with past medical history of hypertension, depression, who presented to the hospital on 11/9/2020 with complaints of progressive generalized weakness and inability to walk with a right temporal headache.  Reportedly symptoms had been having worsening difficulty walking over the last 3 months causing her to have to use a cane and now a walker.  Over the last month the patient's daughter also noted that the patient has been having worsening cognitive changes with disorientation and short-term memory impairment.  2 weeks ago she fell and injured her left hip.  The day of admission the patient was sitting on the toilet and was unable to get herself off due to lower extremity weakness L >R.  Her headache did resolve.  Daughter reports she has had issues with bladder control as well  for 6 months.  BP on arrival 171/97, HR 74.  EKG with NSR, LBBB.  BS 87.Temp 97.4. Cr. 1.16.     Diagnosis: Progressive lower extremity weakness with cognitive decline s/p large-volume tap with removal of 30 cc of CSF, memory loss, left hip pain s/p fall     Work up to date:  11/9 CTH WO: No acute intracranial abnormalities, prominent atrophy and chronic small vessel disease.  11/10 MRI Brain WO: No acute intracranial abnormalities, prominent diffuse atrophy and chronic small vessel changes, transependymal edema noted.  11/10 MRI C-spine WO: Moderate posterior disc osteophyte complex effacing the anterior epidural space causing very slight flattening deformity of the spinal cord with slight central stenosis at C4-5, C5-6, and C6-7.  11/10  MRI Left Hip WO: Advanced osteoarthritis, no fracture, fairly diffuse edema and musculature of the posterior medial right thigh representing muscle strain.  Labs:  CSF TNC 1, protein 29, glucose 54, 87 RBCs, removal of 30 cc of CSF, A1c 5.48, TSH 3.150, LDL 66, mag 1.8, B12 1056, U/A-, COVID-19 negative    Plan:  Plans for outpatient  shunt per neurosurgery.  I will arrange follow-up outpatient in our office for NPH evaluation  PT/OT  Therapies as written. CCP for discharge planning. Call RRT for any acute neurological changes. We will sign off, will see again per request.    Case discussed with patient, RN, and Dr. Schwartz, and he agrees with plan above.   JAMES Leiva

## 2020-11-14 NOTE — DISCHARGE SUMMARY
Discharge summary    Date of admission 11/9/2020  Date of discharge 11/14/2020    Final diagnosis  Probable normal pressure hydrocephalus  Hypertension with elevated blood pressure  Personality disorder  Anxiety disorder  Asthma  Gastroesophageal reflux disease    Discharge medications    Current Facility-Administered Medications:   •  acetaminophen (TYLENOL) tablet 650 mg, 650 mg, Oral, Q4H PRN, Austyn Calderon MD, 650 mg at 11/12/20 0846  •  allopurinol (ZYLOPRIM) tablet 100 mg, 100 mg, Oral, Daily, Austyn Calderon MD, 100 mg at 11/14/20 1033  •  amLODIPine (NORVASC) tablet 10 mg, 10 mg, Oral, Q24H, Austyn Calderon MD, 10 mg at 11/14/20 1033  •  hydrALAZINE (APRESOLINE) tablet 50 mg, 50 mg, Oral, Q8H, Austyn Calderon MD, 50 mg at 11/14/20 0624  •  metoprolol succinate XL (TOPROL-XL) 24 hr tablet 50 mg, 50 mg, Oral, Q24H, Austyn Calderon MD, 50 mg at 11/14/20 1033  •  pantoprazole (PROTONIX) EC tablet 40 mg, 40 mg, Oral, Q AM, Austyn Calderon MD, 40 mg at 11/14/20 0623     Consults obtained  General surgery  Neurology  Psychiatry  Neurosurgery  Nutrition    Procedures  Status post lumbar puncture    Hospital course  74-year-old white female with history of asthma osteoarthritis depression hypertension gastroesophageal reflux disease and dysthymic disorder noncystic personality disorder admitted through emergency room with dizziness with headache also have balance problem.  Patient work-up initially negative admit for management.  Patient admitted she is ruled out for TIA stroke or seizure disorder and followed by neurology and psychiatry.  Patient further evaluated with lumbar puncture which revealed possible normal pressure hydrocephalus.  Patient further evaluated by neurosurgery for  shunt and recommend outpatient  shunt placement and can be discharged.  Patient will be discharged to subacute rehab for PT OT nursing care and her medication adjusted during this hospital stay.  Patient also followed by psychiatry and they  recommend no adjustment of her current medications.    Discharge diet regular    Activity per PT OT    Medication as above    Further care per accepting physician at subacute rehab    SARAH MÁRQUEZ MD

## 2020-11-16 NOTE — PROGRESS NOTES
Case Management Discharge Note      Final Note: Patient DC'd to [facility]    Provided Post Acute Provider List?: Yes  Post Acute Provider List: Home Health, Inpatient Rehab  Delivered To: Patient  Method of Delivery: In person    Selected Continued Care - Discharged on 11/14/2020 Admission date: 11/9/2020 - Discharge disposition: Skilled Nursing Facility (DC - External)    Destination Coordination complete    Service Provider Selected Services Address Phone Fax    [facility]  Skilled Nursing [address] [address] [phone]          Durable Medical Equipment    No services have been selected for the patient.              Dialysis/Infusion    No services have been selected for the patient.              Home Medical Care    No services have been selected for the patient.              Therapy    No services have been selected for the patient.              Community Resources    No services have been selected for the patient.                  Transportation Services  Private: Car    Final Discharge Disposition Code: 03 - skilled nursing facility (SNF)

## 2020-11-17 ENCOUNTER — LAB (OUTPATIENT)
Dept: LAB | Facility: HOSPITAL | Age: 75
End: 2020-11-17

## 2020-11-17 DIAGNOSIS — Z01.818 OTHER SPECIFIED PRE-OPERATIVE EXAMINATION: ICD-10-CM

## 2020-11-18 RX ORDER — METOPROLOL SUCCINATE 50 MG/1
TABLET, EXTENDED RELEASE ORAL
COMMUNITY
Start: 2020-09-09 | End: 2020-11-18

## 2020-11-19 ENCOUNTER — ANESTHESIA (OUTPATIENT)
Dept: PERIOP | Facility: HOSPITAL | Age: 75
End: 2020-11-19

## 2020-11-19 ENCOUNTER — HOSPITAL ENCOUNTER (INPATIENT)
Facility: HOSPITAL | Age: 75
LOS: 3 days | Discharge: NURSING, REHAB OR LONG-TERM CARE | End: 2020-11-22
Attending: NEUROLOGICAL SURGERY | Admitting: NEUROLOGICAL SURGERY

## 2020-11-19 ENCOUNTER — ANESTHESIA EVENT (OUTPATIENT)
Dept: PERIOP | Facility: HOSPITAL | Age: 75
End: 2020-11-19

## 2020-11-19 ENCOUNTER — APPOINTMENT (OUTPATIENT)
Dept: GENERAL RADIOLOGY | Facility: HOSPITAL | Age: 75
End: 2020-11-19

## 2020-11-19 LAB
GLUCOSE BLDC GLUCOMTR-MCNC: 141 MG/DL (ref 70–130)
GLUCOSE BLDC GLUCOMTR-MCNC: 158 MG/DL (ref 70–130)
GLUCOSE BLDC GLUCOMTR-MCNC: 182 MG/DL (ref 70–130)

## 2020-11-19 PROCEDURE — 82962 GLUCOSE BLOOD TEST: CPT

## 2020-11-19 PROCEDURE — C1713 ANCHOR/SCREW BN/BN,TIS/BN: HCPCS | Performed by: NEUROLOGICAL SURGERY

## 2020-11-19 PROCEDURE — 94799 UNLISTED PULMONARY SVC/PX: CPT

## 2020-11-19 PROCEDURE — 25010000003 POTASSIUM CHLORIDE PER 2 MEQ: Performed by: NEUROLOGICAL SURGERY

## 2020-11-19 PROCEDURE — S0260 H&P FOR SURGERY: HCPCS | Performed by: NEUROLOGICAL SURGERY

## 2020-11-19 PROCEDURE — C1757 CATH, THROMBECTOMY/EMBOLECT: HCPCS | Performed by: NEUROLOGICAL SURGERY

## 2020-11-19 PROCEDURE — 62223 ESTABLISH BRAIN CAVITY SHUNT: CPT | Performed by: NEUROLOGICAL SURGERY

## 2020-11-19 PROCEDURE — 25010000002 HYDRALAZINE PER 20 MG: Performed by: INTERNAL MEDICINE

## 2020-11-19 PROCEDURE — 25010000002 HYDRALAZINE PER 20 MG

## 2020-11-19 PROCEDURE — 25010000002 ONDANSETRON PER 1 MG: Performed by: NURSE ANESTHETIST, CERTIFIED REGISTERED

## 2020-11-19 PROCEDURE — C1769 GUIDE WIRE: HCPCS | Performed by: NEUROLOGICAL SURGERY

## 2020-11-19 PROCEDURE — 74018 RADEX ABDOMEN 1 VIEW: CPT

## 2020-11-19 PROCEDURE — 0WJG4ZZ INSPECTION OF PERITONEAL CAVITY, PERCUTANEOUS ENDOSCOPIC APPROACH: ICD-10-PCS | Performed by: SURGERY

## 2020-11-19 PROCEDURE — C1892 INTRO/SHEATH,FIXED,PEEL-AWAY: HCPCS | Performed by: NEUROLOGICAL SURGERY

## 2020-11-19 PROCEDURE — C1729 CATH, DRAINAGE: HCPCS | Performed by: NEUROLOGICAL SURGERY

## 2020-11-19 PROCEDURE — 62223 ESTABLISH BRAIN CAVITY SHUNT: CPT | Performed by: SURGERY

## 2020-11-19 PROCEDURE — 70250 X-RAY EXAM OF SKULL: CPT

## 2020-11-19 PROCEDURE — 25010000002 NEOSTIGMINE PER 0.5 MG: Performed by: NURSE ANESTHETIST, CERTIFIED REGISTERED

## 2020-11-19 PROCEDURE — 25010000002 PROPOFOL 10 MG/ML EMULSION: Performed by: NURSE ANESTHETIST, CERTIFIED REGISTERED

## 2020-11-19 PROCEDURE — 00160J6 BYPASS CEREBRAL VENTRICLE TO PERITONEAL CAVITY WITH SYNTHETIC SUBSTITUTE, OPEN APPROACH: ICD-10-PCS | Performed by: NEUROLOGICAL SURGERY

## 2020-11-19 PROCEDURE — 25010000003 CEFAZOLIN PER 500 MG: Performed by: NEUROLOGICAL SURGERY

## 2020-11-19 PROCEDURE — 25010000003 CEFAZOLIN IN DEXTROSE 2-4 GM/100ML-% SOLUTION: Performed by: SURGERY

## 2020-11-19 PROCEDURE — 25010000002 ONDANSETRON PER 1 MG: Performed by: NEUROLOGICAL SURGERY

## 2020-11-19 PROCEDURE — 71046 X-RAY EXAM CHEST 2 VIEWS: CPT

## 2020-11-19 PROCEDURE — 25010000002 DEXAMETHASONE PER 1 MG: Performed by: NURSE ANESTHETIST, CERTIFIED REGISTERED

## 2020-11-19 PROCEDURE — 25010000002 FENTANYL CITRATE (PF) 100 MCG/2ML SOLUTION: Performed by: NURSE ANESTHETIST, CERTIFIED REGISTERED

## 2020-11-19 DEVICE — CATHETER 95001 KIT ANTIMICROBIAL
Type: IMPLANTABLE DEVICE | Status: FUNCTIONAL
Brand: ARES™

## 2020-11-19 DEVICE — FLOSEAL HEMOSTATIC MATRIX, 5ML
Type: IMPLANTABLE DEVICE | Status: FUNCTIONAL
Brand: FLOSEAL HEMOSTATIC MATRIX

## 2020-11-19 DEVICE — ASSY 46866 FP-STRATA 2 SHUNT REG
Type: IMPLANTABLE DEVICE | Status: FUNCTIONAL
Brand: STRATA®

## 2020-11-19 RX ORDER — FENTANYL CITRATE 50 UG/ML
INJECTION, SOLUTION INTRAMUSCULAR; INTRAVENOUS AS NEEDED
Status: DISCONTINUED | OUTPATIENT
Start: 2020-11-19 | End: 2020-11-19 | Stop reason: SURG

## 2020-11-19 RX ORDER — ALLOPURINOL 100 MG/1
100 TABLET ORAL DAILY
Status: DISCONTINUED | OUTPATIENT
Start: 2020-11-19 | End: 2020-11-22 | Stop reason: HOSPADM

## 2020-11-19 RX ORDER — METOPROLOL SUCCINATE 50 MG/1
50 TABLET, EXTENDED RELEASE ORAL DAILY
Status: DISCONTINUED | OUTPATIENT
Start: 2020-11-19 | End: 2020-11-22 | Stop reason: HOSPADM

## 2020-11-19 RX ORDER — CEFAZOLIN SODIUM 2 G/100ML
2 INJECTION, SOLUTION INTRAVENOUS ONCE
Status: COMPLETED | OUTPATIENT
Start: 2020-11-19 | End: 2020-11-19

## 2020-11-19 RX ORDER — MORPHINE SULFATE 2 MG/ML
2 INJECTION, SOLUTION INTRAMUSCULAR; INTRAVENOUS EVERY 4 HOURS PRN
Status: DISCONTINUED | OUTPATIENT
Start: 2020-11-19 | End: 2020-11-22 | Stop reason: HOSPADM

## 2020-11-19 RX ORDER — HYDRALAZINE HYDROCHLORIDE 20 MG/ML
10 INJECTION INTRAMUSCULAR; INTRAVENOUS EVERY 6 HOURS PRN
Status: DISCONTINUED | OUTPATIENT
Start: 2020-11-19 | End: 2020-11-22 | Stop reason: HOSPADM

## 2020-11-19 RX ORDER — LIDOCAINE HYDROCHLORIDE 10 MG/ML
0.5 INJECTION, SOLUTION EPIDURAL; INFILTRATION; INTRACAUDAL; PERINEURAL ONCE AS NEEDED
Status: DISCONTINUED | OUTPATIENT
Start: 2020-11-19 | End: 2020-11-19 | Stop reason: HOSPADM

## 2020-11-19 RX ORDER — FENTANYL CITRATE 50 UG/ML
50 INJECTION, SOLUTION INTRAMUSCULAR; INTRAVENOUS
Status: DISCONTINUED | OUTPATIENT
Start: 2020-11-19 | End: 2020-11-19 | Stop reason: HOSPADM

## 2020-11-19 RX ORDER — ACETAMINOPHEN 325 MG/1
650 TABLET ORAL EVERY 4 HOURS PRN
Status: DISCONTINUED | OUTPATIENT
Start: 2020-11-19 | End: 2020-11-22 | Stop reason: HOSPADM

## 2020-11-19 RX ORDER — SODIUM CHLORIDE AND POTASSIUM CHLORIDE 150; 450 MG/100ML; MG/100ML
75 INJECTION, SOLUTION INTRAVENOUS CONTINUOUS
Status: DISCONTINUED | OUTPATIENT
Start: 2020-11-19 | End: 2020-11-21

## 2020-11-19 RX ORDER — FAMOTIDINE 10 MG/ML
20 INJECTION, SOLUTION INTRAVENOUS ONCE
Status: COMPLETED | OUTPATIENT
Start: 2020-11-19 | End: 2020-11-19

## 2020-11-19 RX ORDER — NALOXONE HCL 0.4 MG/ML
0.2 VIAL (ML) INJECTION AS NEEDED
Status: DISCONTINUED | OUTPATIENT
Start: 2020-11-19 | End: 2020-11-19 | Stop reason: HOSPADM

## 2020-11-19 RX ORDER — LABETALOL HYDROCHLORIDE 5 MG/ML
10 INJECTION, SOLUTION INTRAVENOUS EVERY 4 HOURS PRN
Status: DISCONTINUED | OUTPATIENT
Start: 2020-11-19 | End: 2020-11-22 | Stop reason: HOSPADM

## 2020-11-19 RX ORDER — SODIUM CHLORIDE 0.9 % (FLUSH) 0.9 %
3-10 SYRINGE (ML) INJECTION AS NEEDED
Status: DISCONTINUED | OUTPATIENT
Start: 2020-11-19 | End: 2020-11-19 | Stop reason: HOSPADM

## 2020-11-19 RX ORDER — PROMETHAZINE HYDROCHLORIDE 25 MG/1
25 SUPPOSITORY RECTAL ONCE AS NEEDED
Status: DISCONTINUED | OUTPATIENT
Start: 2020-11-19 | End: 2020-11-19 | Stop reason: HOSPADM

## 2020-11-19 RX ORDER — LIDOCAINE HYDROCHLORIDE 20 MG/ML
INJECTION, SOLUTION INFILTRATION; PERINEURAL AS NEEDED
Status: DISCONTINUED | OUTPATIENT
Start: 2020-11-19 | End: 2020-11-19 | Stop reason: SURG

## 2020-11-19 RX ORDER — AMLODIPINE BESYLATE 10 MG/1
10 TABLET ORAL
Status: DISCONTINUED | OUTPATIENT
Start: 2020-11-19 | End: 2020-11-22 | Stop reason: HOSPADM

## 2020-11-19 RX ORDER — ONDANSETRON 2 MG/ML
4 INJECTION INTRAMUSCULAR; INTRAVENOUS ONCE AS NEEDED
Status: COMPLETED | OUTPATIENT
Start: 2020-11-19 | End: 2020-11-19

## 2020-11-19 RX ORDER — OXYCODONE AND ACETAMINOPHEN 7.5; 325 MG/1; MG/1
1 TABLET ORAL ONCE AS NEEDED
Status: DISCONTINUED | OUTPATIENT
Start: 2020-11-19 | End: 2020-11-19 | Stop reason: HOSPADM

## 2020-11-19 RX ORDER — HYDRALAZINE HYDROCHLORIDE 20 MG/ML
INJECTION INTRAMUSCULAR; INTRAVENOUS
Status: COMPLETED
Start: 2020-11-19 | End: 2020-11-19

## 2020-11-19 RX ORDER — GLYCOPYRROLATE 0.2 MG/ML
INJECTION INTRAMUSCULAR; INTRAVENOUS AS NEEDED
Status: DISCONTINUED | OUTPATIENT
Start: 2020-11-19 | End: 2020-11-19 | Stop reason: SURG

## 2020-11-19 RX ORDER — HYDROCODONE BITARTRATE AND ACETAMINOPHEN 7.5; 325 MG/1; MG/1
2 TABLET ORAL EVERY 4 HOURS PRN
Status: DISCONTINUED | OUTPATIENT
Start: 2020-11-19 | End: 2020-11-22 | Stop reason: HOSPADM

## 2020-11-19 RX ORDER — PROMETHAZINE HYDROCHLORIDE 25 MG/1
25 TABLET ORAL ONCE AS NEEDED
Status: DISCONTINUED | OUTPATIENT
Start: 2020-11-19 | End: 2020-11-19 | Stop reason: HOSPADM

## 2020-11-19 RX ORDER — DIPHENHYDRAMINE HYDROCHLORIDE 50 MG/ML
12.5 INJECTION INTRAMUSCULAR; INTRAVENOUS
Status: DISCONTINUED | OUTPATIENT
Start: 2020-11-19 | End: 2020-11-19 | Stop reason: HOSPADM

## 2020-11-19 RX ORDER — ONDANSETRON 2 MG/ML
4 INJECTION INTRAMUSCULAR; INTRAVENOUS EVERY 6 HOURS PRN
Status: DISCONTINUED | OUTPATIENT
Start: 2020-11-19 | End: 2020-11-22 | Stop reason: HOSPADM

## 2020-11-19 RX ORDER — FLUMAZENIL 0.1 MG/ML
0.2 INJECTION INTRAVENOUS AS NEEDED
Status: DISCONTINUED | OUTPATIENT
Start: 2020-11-19 | End: 2020-11-19 | Stop reason: HOSPADM

## 2020-11-19 RX ORDER — SODIUM CHLORIDE, SODIUM LACTATE, POTASSIUM CHLORIDE, CALCIUM CHLORIDE 600; 310; 30; 20 MG/100ML; MG/100ML; MG/100ML; MG/100ML
9 INJECTION, SOLUTION INTRAVENOUS CONTINUOUS
Status: DISCONTINUED | OUTPATIENT
Start: 2020-11-19 | End: 2020-11-22 | Stop reason: HOSPADM

## 2020-11-19 RX ORDER — ACETAMINOPHEN 325 MG/1
650 TABLET ORAL EVERY 6 HOURS PRN
COMMUNITY
End: 2021-01-08 | Stop reason: SDUPTHER

## 2020-11-19 RX ORDER — ROCURONIUM BROMIDE 10 MG/ML
INJECTION, SOLUTION INTRAVENOUS AS NEEDED
Status: DISCONTINUED | OUTPATIENT
Start: 2020-11-19 | End: 2020-11-19 | Stop reason: SURG

## 2020-11-19 RX ORDER — LABETALOL HYDROCHLORIDE 5 MG/ML
5 INJECTION, SOLUTION INTRAVENOUS
Status: DISCONTINUED | OUTPATIENT
Start: 2020-11-19 | End: 2020-11-19 | Stop reason: HOSPADM

## 2020-11-19 RX ORDER — HYDROCORTISONE ACETATE 25 MG/1
25 SUPPOSITORY RECTAL 2 TIMES DAILY
Status: DISCONTINUED | OUTPATIENT
Start: 2020-11-19 | End: 2020-11-20

## 2020-11-19 RX ORDER — HYDROCODONE BITARTRATE AND ACETAMINOPHEN 7.5; 325 MG/1; MG/1
1 TABLET ORAL ONCE AS NEEDED
Status: DISCONTINUED | OUTPATIENT
Start: 2020-11-19 | End: 2020-11-19 | Stop reason: HOSPADM

## 2020-11-19 RX ORDER — EPHEDRINE SULFATE 50 MG/ML
5 INJECTION, SOLUTION INTRAVENOUS ONCE AS NEEDED
Status: DISCONTINUED | OUTPATIENT
Start: 2020-11-19 | End: 2020-11-19 | Stop reason: HOSPADM

## 2020-11-19 RX ORDER — DIPHENHYDRAMINE HCL 25 MG
25 CAPSULE ORAL
Status: DISCONTINUED | OUTPATIENT
Start: 2020-11-19 | End: 2020-11-19 | Stop reason: HOSPADM

## 2020-11-19 RX ORDER — ONDANSETRON 2 MG/ML
INJECTION INTRAMUSCULAR; INTRAVENOUS AS NEEDED
Status: DISCONTINUED | OUTPATIENT
Start: 2020-11-19 | End: 2020-11-19 | Stop reason: SURG

## 2020-11-19 RX ORDER — DEXAMETHASONE SODIUM PHOSPHATE 10 MG/ML
INJECTION INTRAMUSCULAR; INTRAVENOUS AS NEEDED
Status: DISCONTINUED | OUTPATIENT
Start: 2020-11-19 | End: 2020-11-19 | Stop reason: SURG

## 2020-11-19 RX ORDER — PROPOFOL 10 MG/ML
VIAL (ML) INTRAVENOUS AS NEEDED
Status: DISCONTINUED | OUTPATIENT
Start: 2020-11-19 | End: 2020-11-19 | Stop reason: SURG

## 2020-11-19 RX ORDER — NALOXONE HCL 0.4 MG/ML
0.4 VIAL (ML) INJECTION
Status: DISCONTINUED | OUTPATIENT
Start: 2020-11-19 | End: 2020-11-22 | Stop reason: HOSPADM

## 2020-11-19 RX ORDER — LIDOCAINE HYDROCHLORIDE AND EPINEPHRINE 10; 10 MG/ML; UG/ML
INJECTION, SOLUTION INFILTRATION; PERINEURAL AS NEEDED
Status: DISCONTINUED | OUTPATIENT
Start: 2020-11-19 | End: 2020-11-19 | Stop reason: HOSPADM

## 2020-11-19 RX ORDER — SODIUM CHLORIDE, SODIUM LACTATE, POTASSIUM CHLORIDE, CALCIUM CHLORIDE 600; 310; 30; 20 MG/100ML; MG/100ML; MG/100ML; MG/100ML
INJECTION, SOLUTION INTRAVENOUS CONTINUOUS PRN
Status: DISCONTINUED | OUTPATIENT
Start: 2020-11-19 | End: 2020-11-19 | Stop reason: SURG

## 2020-11-19 RX ORDER — ACETAMINOPHEN 325 MG/1
650 TABLET ORAL EVERY 6 HOURS PRN
Status: DISCONTINUED | OUTPATIENT
Start: 2020-11-19 | End: 2020-11-19

## 2020-11-19 RX ORDER — HYDRALAZINE HYDROCHLORIDE 20 MG/ML
10 INJECTION INTRAMUSCULAR; INTRAVENOUS ONCE
Status: COMPLETED | OUTPATIENT
Start: 2020-11-19 | End: 2020-11-19

## 2020-11-19 RX ORDER — IPRATROPIUM BROMIDE AND ALBUTEROL SULFATE 2.5; .5 MG/3ML; MG/3ML
1.5 SOLUTION RESPIRATORY (INHALATION) EVERY 6 HOURS PRN
Status: DISCONTINUED | OUTPATIENT
Start: 2020-11-19 | End: 2020-11-22 | Stop reason: HOSPADM

## 2020-11-19 RX ORDER — PANTOPRAZOLE SODIUM 40 MG/1
40 TABLET, DELAYED RELEASE ORAL DAILY
Status: DISCONTINUED | OUTPATIENT
Start: 2020-11-20 | End: 2020-11-22 | Stop reason: HOSPADM

## 2020-11-19 RX ORDER — ONDANSETRON 4 MG/1
4 TABLET, FILM COATED ORAL EVERY 6 HOURS PRN
Status: DISCONTINUED | OUTPATIENT
Start: 2020-11-19 | End: 2020-11-22 | Stop reason: HOSPADM

## 2020-11-19 RX ORDER — HYDROCORTISONE ACETATE 25 MG/1
25 SUPPOSITORY RECTAL 2 TIMES DAILY
Status: ON HOLD | COMMUNITY
End: 2020-11-20

## 2020-11-19 RX ORDER — SODIUM CHLORIDE 0.9 % (FLUSH) 0.9 %
3 SYRINGE (ML) INJECTION EVERY 12 HOURS SCHEDULED
Status: DISCONTINUED | OUTPATIENT
Start: 2020-11-19 | End: 2020-11-19 | Stop reason: HOSPADM

## 2020-11-19 RX ORDER — HYDROMORPHONE HYDROCHLORIDE 1 MG/ML
0.5 INJECTION, SOLUTION INTRAMUSCULAR; INTRAVENOUS; SUBCUTANEOUS
Status: DISCONTINUED | OUTPATIENT
Start: 2020-11-19 | End: 2020-11-19 | Stop reason: HOSPADM

## 2020-11-19 RX ORDER — MAGNESIUM HYDROXIDE 1200 MG/15ML
LIQUID ORAL AS NEEDED
Status: DISCONTINUED | OUTPATIENT
Start: 2020-11-19 | End: 2020-11-19 | Stop reason: HOSPADM

## 2020-11-19 RX ORDER — BUPIVACAINE HYDROCHLORIDE AND EPINEPHRINE 5; 5 MG/ML; UG/ML
INJECTION, SOLUTION EPIDURAL; INTRACAUDAL; PERINEURAL AS NEEDED
Status: DISCONTINUED | OUTPATIENT
Start: 2020-11-19 | End: 2020-11-19 | Stop reason: HOSPADM

## 2020-11-19 RX ORDER — ACETAMINOPHEN 650 MG/1
650 SUPPOSITORY RECTAL EVERY 4 HOURS PRN
Status: DISCONTINUED | OUTPATIENT
Start: 2020-11-19 | End: 2020-11-22 | Stop reason: HOSPADM

## 2020-11-19 RX ORDER — HYDRALAZINE HYDROCHLORIDE 50 MG/1
50 TABLET, FILM COATED ORAL EVERY 8 HOURS SCHEDULED
Status: DISCONTINUED | OUTPATIENT
Start: 2020-11-19 | End: 2020-11-22 | Stop reason: HOSPADM

## 2020-11-19 RX ADMIN — LABETALOL HYDROCHLORIDE 10 MG: 5 INJECTION, SOLUTION INTRAVENOUS at 22:16

## 2020-11-19 RX ADMIN — SUGAMMADEX 107 MG: 100 INJECTION, SOLUTION INTRAVENOUS at 15:23

## 2020-11-19 RX ADMIN — HYDROCODONE BITARTRATE AND ACETAMINOPHEN 1 TABLET: 7.5; 325 TABLET ORAL at 19:40

## 2020-11-19 RX ADMIN — ALLOPURINOL 100 MG: 100 TABLET ORAL at 22:38

## 2020-11-19 RX ADMIN — POTASSIUM CHLORIDE AND SODIUM CHLORIDE 75 ML/HR: 450; 150 INJECTION, SOLUTION INTRAVENOUS at 22:19

## 2020-11-19 RX ADMIN — HYDRALAZINE HYDROCHLORIDE 50 MG: 50 TABLET, FILM COATED ORAL at 23:52

## 2020-11-19 RX ADMIN — ROCURONIUM BROMIDE 10 MG: 10 INJECTION INTRAVENOUS at 14:27

## 2020-11-19 RX ADMIN — HYDROCODONE BITARTRATE AND ACETAMINOPHEN 1 TABLET: 7.5; 325 TABLET ORAL at 22:23

## 2020-11-19 RX ADMIN — SODIUM CHLORIDE, POTASSIUM CHLORIDE, SODIUM LACTATE AND CALCIUM CHLORIDE 9 ML/HR: 600; 310; 30; 20 INJECTION, SOLUTION INTRAVENOUS at 10:20

## 2020-11-19 RX ADMIN — LABETALOL HYDROCHLORIDE 5 MG: 5 INJECTION, SOLUTION INTRAVENOUS at 17:09

## 2020-11-19 RX ADMIN — FENTANYL CITRATE 50 MCG: 50 INJECTION INTRAMUSCULAR; INTRAVENOUS at 14:15

## 2020-11-19 RX ADMIN — HYDRALAZINE HYDROCHLORIDE 10 MG: 20 INJECTION, SOLUTION INTRAMUSCULAR; INTRAVENOUS at 16:03

## 2020-11-19 RX ADMIN — PROPOFOL 150 MG: 10 INJECTION, EMULSION INTRAVENOUS at 13:22

## 2020-11-19 RX ADMIN — AMLODIPINE BESYLATE 10 MG: 10 TABLET ORAL at 22:39

## 2020-11-19 RX ADMIN — CEFAZOLIN SODIUM 2 G: 2 INJECTION, SOLUTION INTRAVENOUS at 13:27

## 2020-11-19 RX ADMIN — ONDANSETRON 4 MG: 2 INJECTION INTRAMUSCULAR; INTRAVENOUS at 22:15

## 2020-11-19 RX ADMIN — POTASSIUM CHLORIDE AND SODIUM CHLORIDE 75 ML/HR: 450; 150 INJECTION, SOLUTION INTRAVENOUS at 22:23

## 2020-11-19 RX ADMIN — HYDROCORTISONE ACETATE 25 MG: 25 SUPPOSITORY RECTAL at 22:40

## 2020-11-19 RX ADMIN — DEXAMETHASONE SODIUM PHOSPHATE 8 MG: 10 INJECTION INTRAMUSCULAR; INTRAVENOUS at 13:57

## 2020-11-19 RX ADMIN — ONDANSETRON HYDROCHLORIDE 4 MG: 2 SOLUTION INTRAMUSCULAR; INTRAVENOUS at 13:20

## 2020-11-19 RX ADMIN — GLYCOPYRROLATE 0.3 MG: 0.2 INJECTION INTRAMUSCULAR; INTRAVENOUS at 15:13

## 2020-11-19 RX ADMIN — PROPOFOL 50 MG: 10 INJECTION, EMULSION INTRAVENOUS at 14:37

## 2020-11-19 RX ADMIN — HYDRALAZINE HYDROCHLORIDE 10 MG: 20 INJECTION INTRAMUSCULAR; INTRAVENOUS at 16:03

## 2020-11-19 RX ADMIN — LIDOCAINE HYDROCHLORIDE 30 MG: 20 INJECTION, SOLUTION INFILTRATION; PERINEURAL at 13:22

## 2020-11-19 RX ADMIN — ROCURONIUM BROMIDE 40 MG: 10 INJECTION INTRAVENOUS at 13:22

## 2020-11-19 RX ADMIN — FENTANYL CITRATE 50 MCG: 50 INJECTION INTRAMUSCULAR; INTRAVENOUS at 14:37

## 2020-11-19 RX ADMIN — SODIUM CHLORIDE, POTASSIUM CHLORIDE, SODIUM LACTATE AND CALCIUM CHLORIDE: 600; 310; 30; 20 INJECTION, SOLUTION INTRAVENOUS at 14:31

## 2020-11-19 RX ADMIN — IPRATROPIUM BROMIDE AND ALBUTEROL SULFATE 1.5 ML: 2.5; .5 SOLUTION RESPIRATORY (INHALATION) at 23:20

## 2020-11-19 RX ADMIN — GLYCOPYRROLATE 0.2 MG: 0.2 INJECTION INTRAMUSCULAR; INTRAVENOUS at 13:20

## 2020-11-19 RX ADMIN — NEOSTIGMINE METHYLSULFATE 3 MG: 1 INJECTION INTRAMUSCULAR; INTRAVENOUS; SUBCUTANEOUS at 15:13

## 2020-11-19 RX ADMIN — METOPROLOL SUCCINATE 50 MG: 50 TABLET, EXTENDED RELEASE ORAL at 22:39

## 2020-11-19 RX ADMIN — HYDRALAZINE HYDROCHLORIDE 10 MG: 20 INJECTION, SOLUTION INTRAMUSCULAR; INTRAVENOUS at 19:00

## 2020-11-19 RX ADMIN — ONDANSETRON 4 MG: 2 INJECTION INTRAMUSCULAR; INTRAVENOUS at 16:33

## 2020-11-19 RX ADMIN — FAMOTIDINE 20 MG: 10 INJECTION INTRAVENOUS at 11:51

## 2020-11-19 NOTE — PERIOPERATIVE NURSING NOTE
Spoke with Tay Devlin LPN, at [facility] to review medications, allergies, NPO status and isolation status.  He stated that pt is in precautionary isolation due to the fact that she came from a hospital and she is isolated for 2 weeks.

## 2020-11-19 NOTE — ANESTHESIA PROCEDURE NOTES
Peripheral IV    Patient location during procedure: OR  Start time: 11/19/2020 1:30 PM  Line placed for Fluids/Medication Admin.  Performed By   CRNA: Miriam Wang CRNA  Preanesthetic Checklist  Completed: patient identified, site marked, surgical consent, pre-op evaluation, timeout performed, IV checked, risks and benefits discussed and monitors and equipment checked  Peripheral IV Prep   Patient position: supine   Prep: ChloraPrep  Patient monitoring: heart rate, cardiac monitor and continuous pulse ox  Peripheral IV Procedure   Laterality:right  Location:  Wrist  Catheter size: 20 G         Post Assessment   Dressing Type: transparent and tape.    IV Dressing/Site: clean, dry and intact

## 2020-11-19 NOTE — H&P
Chief complaint: Normal pressure hydrocephalus    HPI: The patient is a 75-year-old female recently admitted to the hospital and diagnosed with normal pressure hydrocephalus.  She had a lumbar puncture with high-volume tap as well as assessment by physical therapy with significant improvement in her cognition as well as gait.  Neurosurgery was consulted to evaluate for possible placement of a ventriculoperitoneal shunt to treat her NPH.  I discussed the risks and benefits of the procedure with the patient and her daughter including but not limited to infection, hemorrhage, need for additional procedures.  They expressed understanding of the risks and benefits of the procedure and have elected to proceed.     Review of systems negative except for HPI      Past Medical History:   Diagnosis Date   • Arthritis    • Asthma    • Chest pain    • Depression    • Dizziness 11/11/2020    Went to ER with dizziness and generalized weakness--found to have hydrocephalus.    • Ganglion cyst     right leg   • GERD (gastroesophageal reflux disease)    • Gout    • Hiatal hernia    • Hypertension    • Hypokalemia    • Mild mitral regurgitation    • Normal pressure hydrocephalus (CMS/HCC)    • Personality disorder (CMS/HCC)     PER NOTE IN EPIC    • PONV (postoperative nausea and vomiting)    • Recovering alcoholic (CMS/HCC)     SOBER SINCE 2003   • Thyroid nodule    • Tricuspid regurgitation    • Vitamin D deficiency      Medications: No blood thinners.  Specifically no aspirin, no Plavix no Coumadin  Allergies   Allergen Reactions   • Iodinated Diagnostic Agents Shortness Of Breath     Asthma attack and hives   • Aspirin Other (See Comments)     WHEEZING       • Doxycycline Other (See Comments)     PT DOESN'T REMEMBER    • Sertraline Diarrhea, Nausea And Vomiting and Other (See Comments)     CHEST PAIN          Past Surgical History:   Procedure Laterality Date   • APPENDECTOMY     • BREAST LUMPECTOMY     • CHOLECYSTECTOMY     •  "GANGLION CYST EXCISION     • HERNIA REPAIR     • HYSTERECTOMY       Social History     Socioeconomic History   • Marital status:      Spouse name: Not on file   • Number of children: Not on file   • Years of education: Not on file   • Highest education level: Not on file   Tobacco Use   • Smoking status: Former Smoker     Packs/day: 2.00     Years: 20.00     Pack years: 40.00     Types: Cigarettes     Quit date: 2005     Years since quitting: 15.8   • Smokeless tobacco: Never Used   Substance and Sexual Activity   • Alcohol use: No     Comment: \"15 years sobriety\"   • Drug use: No   • Sexual activity: Never     Birth control/protection: Surgical     Family History   Problem Relation Age of Onset   • Heart attack Other    • Heart disease Other    • Hypertension Father    • Stroke Father    • Malig Hyperthermia Neg Hx        Physical exam  Awake, alert, oriented x3  Pupils equal round reactive to light  Extraocular muscles intact  Face symmetric  Speech is fluent and clear  No pronator drift  Motor exam  Bilateral deltoids 5/5, bilateral biceps 5/5, bilateral triceps 5/5, bilateral wrist extension 5/5 bilateral hand  5/5  Bilateral hip flexion 5/5, bilateral knee extension 5/5, bilateral DF/PF 5/5  No clonus  No Augusta's reflex  Gait deferred   able to detect  light touch in all 4 extremities      Assessment/plan  75-year-old female recently diagnosed with NPH with significant improvement after lumbar puncture  -Plan to proceed to the OR for right parietal approach ventriculoperitoneal shunt placement using Stealth navigation and general surgery assistance for the abdominal portion.  -will plan to admit to the ICU for observation overnight  - will plan to obtain a postop CT and x-ray series to evaluate shunt position.  "

## 2020-11-19 NOTE — CONSULTS
Referring Provider: Dr. Silverio  Reason for Consultation: ICU care    Patient Care Team:  Babar Jones MD as PCP - General  Babar Jones MD as PCP - Family Medicine    Chief complaint:   Admitted for brain surgery    History of present illness:    Subjective   This is a 75-year-old female patient who was recently hospitalized on 11/10/2020 for symptoms of imbalance, urinary incontinence and confusion for about 2-month, gradually getting worse..  She was found to have NPH on imaging.  She was discharged on 11/14/20 to [facility].  She was readmitted electively today for  shunt placement.  Procedure was done today and was uneventful.    I saw the patient postoperatively.  Her blood pressure was elevated around 165/80.  She was complaining about headache, moderate in intensity about 6/10, dull, with no radiation.  No associated visual disturbance, nausea or vomiting.  Her daughter was at bedside and assisted with the history.    During her last hospitalization, 2 blood pressure medications were added.  On further questioning, patient indicated loud snoring but no prior history of sleep apnea.    Labs dating 11/14/2020 reviewed:  WBC 11; sodium 134; creatinine 1.3      Review of Systems  Constitutional: No fever or chills.   ENMT: No sinus congestion.  Loud snoring.  Cardiovascular: No chest pain, palpitation or legs swelling.    Respiratory: No dyspnea, cough or wheezing.  Gastrointestinal: No constipation, diarrhea or abdominal pain   Neurology: Current headache.  Weakness in legs with imbalance.  Dizziness.  Musculoskeletal: Reported mild right shoulder pain but otherwise no joints pain, stiffness or swelling.   Psychiatry: No depression or anxiety.  Genitourinary: No dysuria but incontinence and nocturia  Endo: No weight changes. No cold or warm intolerance.  Lymphatic: No swollen glands.  Integumentary: No rash.    History  Past Medical History:   Diagnosis  "Date   • Arthritis    • Asthma    • Chest pain    • Depression    • Dizziness 11/11/2020    Went to ER with dizziness and generalized weakness--found to have hydrocephalus.    • Ganglion cyst     right leg   • GERD (gastroesophageal reflux disease)    • Gout    • Hiatal hernia    • Hypertension    • Hypokalemia    • Mild mitral regurgitation    • Normal pressure hydrocephalus (CMS/HCC)    • Personality disorder (CMS/HCC)     PER NOTE IN EPIC    • PONV (postoperative nausea and vomiting)    • Recovering alcoholic (CMS/HCC)     SOBER SINCE 2003   • Thyroid nodule    • Tricuspid regurgitation    • Vitamin D deficiency    ,   Past Surgical History:   Procedure Laterality Date   • APPENDECTOMY     • BREAST LUMPECTOMY     • CHOLECYSTECTOMY     • GANGLION CYST EXCISION     • HERNIA REPAIR     • HYSTERECTOMY     ,   Family History   Problem Relation Age of Onset   • Heart attack Other    • Heart disease Other    • Hypertension Father    • Stroke Father    • Malig Hyperthermia Neg Hx    ,   Social History     Tobacco Use   • Smoking status: Former Smoker     Packs/day: 2.00     Years: 20.00     Pack years: 40.00     Types: Cigarettes     Quit date: 2005     Years since quitting: 15.8   • Smokeless tobacco: Never Used   Substance Use Topics   • Alcohol use: No     Comment: \"15 years sobriety\"   • Drug use: No     E-cigarette/Vaping   • E-cigarette/Vaping Use Never User      E-cigarette/Vaping Substances     E-cigarette/Vaping Devices       ,   Medications Prior to Admission   Medication Sig Dispense Refill Last Dose   • albuterol (VENTOLIN HFA) 108 (90 BASE) MCG/ACT inhaler 2 puffs Every 4 (Four) Hours As Needed.   11/19/2020 at 1005   • allopurinol (ZYLOPRIM) 100 MG tablet Take 1 tablet by mouth Daily. 90 tablet 3 11/18/2020 at 0820   • amLODIPine (NORVASC) 10 MG tablet Take 1 tablet by mouth Daily for 30 days. 30 tablet 0 11/18/2020 at 0820   • hydrALAZINE (APRESOLINE) 50 MG tablet Take 1 tablet by mouth Every 8 (Eight) " Hours for 30 days. 90 tablet 0 11/18/2020 at 1920   • hydrocortisone (ANUSOL-HC) 25 MG suppository Insert 25 mg into the rectum 2 (Two) Times a Day.   11/18/2020 at 1920   • metoprolol succinate XL (TOPROL-XL) 25 MG 24 hr tablet 50 mg Daily.   11/18/2020 at 0820   • pantoprazole (PROTONIX) 40 MG EC tablet Take 1 tablet by mouth Daily for 30 days. 30 tablet 0 11/18/2020 at 0820   • acetaminophen (TYLENOL) 325 MG tablet Take 650 mg by mouth Every 6 (Six) Hours As Needed for Mild Pain .   More than a month at Unknown time   • ipratropium-albuterol (DUO-NEB) 0.5-2.5 mg/mL nebulizer 2 sprays into each nostril daily 360 mL 0 More than a month at Unknown time   , Scheduled Meds:  allopurinol, 100 mg, Oral, Daily  amLODIPine, 10 mg, Oral, Q24H  hydrALAZINE, 50 mg, Oral, Q8H  hydrocortisone, 25 mg, Rectal, BID  metoprolol succinate XL, 50 mg, Oral, Daily  [START ON 11/20/2020] pantoprazole, 40 mg, Oral, Daily     and Allergies:  Iodinated diagnostic agents, Aspirin, Doxycycline, and Sertraline    Objective     Vital Signs   Temp:  [97.9 °F (36.6 °C)-99.7 °F (37.6 °C)] 99.7 °F (37.6 °C)  Heart Rate:  [80-94] 90  Resp:  [13-18] 13  BP: (138-173)/(64-82) 158/77    Physical Exam:  Constitutional: Not in acute distress.  Eyes: Injected conjunctivae, EOMI. pupils equal reactive to light.  ENMT: Raphael and Mallampati 3. No oral thrush.  Mild scalloping of the tongue.  Mild retrognathia  Neck:Trachea midline. No thyromegaly  Heart: RRR, no murmur  Lungs: Good and equal air entry bilaterally.  Non labored breathing.   Abdomen: Obese. Soft. No tenderness or dullness. No HSM.  Extremities: No cyanosis, clubbing or pitting edema.  Warm extremities and well-perfused.  Neuro: Conscious, alert, oriented x3.  Strength 5/5 in arms.  Psych: Appropriate mood but flat affect  Integumentary: No rash.  Normal skin turgor  Lymphatic: No palpable cervical or supraclavicular lymph nodes.      Diagnostic imaging:  I personally and independently  reviewed the following images:     CT head 11/13/2020: Ventriculomegaly with no obstruction consistent with communicating hydrocephalus.      Assessment     1. NPH, s/p  shunt 11/19/2020  2. Resistant hypertension  3. Headache, secondary to #1  4. Snoring, suspect sleep apnea in the setting of resistant hypertension    Recommendations:    · Admit to ICU.  Neurochecks per protocol  · Continue home blood pressure medications: Amlodipine, metoprolol and hydralazine.  · Add labetalol 10 mg every 4 hours as needed for SBP >160.  Nicardipine drip if blood pressure remains significantly elevated despite the medications listed  · Pain management with Tylenol, Norco and morphine as needed  · DVT prophylaxis with SCD  · Consult case management.  Patient and daughter would like her to go back to [facility] on discharge for rehab    Discussed with staff and daughter at bedside    Blaire Hendricks MD  11/19/20  18:54 EST

## 2020-11-19 NOTE — OP NOTE
PREOPERATIVE DIAGNOSIS: Normal pressure hydrocephalus.     POSTOPERATIVE DIAGNOSIS: Normal pressure hydrocephalus.     PROCEDURE:  Laparoscopic placement of peritoneal catheter for ventriculoperitoneal shunt.     SURGEON/STAFF: Tad Arriaga M.D.  ASSISTANT:  None.  ANESTHESIA: General.   BLOOD LOSS: 10 mL.  COUNTS:  Needle and sponge count correct.  SPECIMENS: None.  DRAINS:  shunt catheter is directed above the right liver.     INDICATIONS FOR OPERATION: Mary Boyle is a 75 y.o. woman who has been diagnosed with normal pressure hydrocephalus. She has been recommended for ventriculoperitoneal shunt insertion by Dr. Silverio, her neurosurgeon. I have been asked to place the distal end of the shunt catheter into the peritoneum.     OPERATION: The patient was brought to the operating room in stable condition. Perioperative antibiotics were given. Sequential compression devices were in place. The patient was positioned supine on the operating room table. General anesthesia was induced without difficulty. The patient was prepped and draped in the usual sterile fashion.  Dr. Silverio performed the portions of the procedure that involved placement of the shunt into the ventricle and tunneling of the catheter to the abdominal wall.       The skin at the right subcostal position was anesthetized with 0.5% Marcaine with epinephrine. A 5 mm Opti-view trocar was used to gain entry into the peritoneum under laparoscopic vision. A brief survey of the abdominal cavity revealed no intra-abdominal injury. There were adhesions in the lower abdomen from prior surgery. The liver and was normal. The gallbladder had been previously removed. The visualized portion of small intestine appeared normal. One additional trocar was placed, a 5 mm port in the right lateral abdomen.      An 8 Egyptian peel-away introducer was placed in the right upper quadrant.where the catheter had been tunneled.  I inserted the catheter into the  peritoneum and directed it over the right liver. The peel-away introducer was removed. I was able to visualize cerebrospinal fluid dripping from the tip of the catheter.     The insufflation was evacuated and the trocars were removed. The skin was closed with 4-0 Vicryl and streri-strips. She tolerated the procedure very well, and is transported to the recovery room in stable condition.

## 2020-11-19 NOTE — OP NOTE
Preoperative diagnosis: Normal pressure hydrocephalus with significant improvement in gait, urinary incontinence, and cognition after lumbar puncture    Postoperative diagnosis: Normal pressure hydrocephalus now status post right parietal approach ventriculoperitoneal shunt placement    Procedure performed: Right parietal approach ventricular-peritoneal shunt placement with Stealth navigation and laparoscopic assistance    Surgeon: Gaston Silverio MD  Co-surgeon: Tad Arriaga MD    Assistant: Kimberley Arreola CFA who was instrumental in helping with hemostasis, visualization of neural structures, and retraction of neural structures.  Her skilled assistance was necessary for the success of this case    Indications for the procedure: Patient is a 75-year-old female with a several month history of memory decline, cognitive decline, urinary incontinence, shuffling gait and instability while walking.  During a recent hospital admission she was worked up for normal pressure hydrocephalus and had a lumbar puncture with a significant improvement in her symptoms.  Neurosurgery was consulted to evaluate for  shunt placement.  The patient and her daughter were consented for the procedure.  I discussed the risks and benefits of the procedure with the patient and her daughter including but not limited to infection, hemorrhage, stroke, paralysis, need for additional procedures.  They both expressed understanding of the risks and benefits of the procedure and elected to proceed with the right parietal approach ventriculoperitoneal shunt.     Description: Patient was brought into the operating room.  A timeout was performed.  He was put to sleep with general endotracheal anesthesia.  His scalp was prepped and draped in the usual sterile fashion.  Her head had been registered with Stealth navigation.  A right parietal bur hole was planned out as well as a a target in the right lateral ventricle.  After 2 g of Ancef was given  a timeout was performed an incision was made with a 10 blade knife.  Curved Durand scissors and a Troncoso instrument was used to tunnel through the cervical fascia and create space for the shunt valve.  Next a long aluminum tunneler device was used to tunnel from the incision over the right parietal region down to the right lower quadrant.  A small incision was made over the end of the tunneler.  Next a Medtronic reprogrammable valve was opened and prepped the distal end of the catheter was advanced through the tunneling device.  The tunneling device was then removed and the shunt valve was positioned within the pocket.  A small  drill bit was then used to make a small bur hole over the right parietal region.  Hemostasis was then obtained using a bipolar forceps and the dura was then opened using an 11 blade knife and bipolar electrocautery.  The underlying parietal lobe was then coagulated.  Using Stealth navigation the tracheostomy catheter was then inserted to 6 cm using the stylette to advanced to the target.  After reaching the target the stylette was removed and then another 2 cm was advanced without the stylette.  There was brisk CSF returning from the catheter.  The catheter was then next connected to the shunt valve and a 2-0 silk suture was used to secure the ventriculostomy catheter to the shunt valve.  The right angle bend protector was then secured at the region of the bur hole to the ventriculostomy catheter.  The shunt valve was then sutured into place using a 4-0 Nurolon suture.  Next 1 L of saline irrigation mixed with Kefzol was irrigated through the incision.  The cranial incision was then closed usin 2-0 Vicryl sutures for the galea and 3-0 Monocryl for the skin.  The skin was then covered with Dermabond.  The valve was programmed before the procedure to 2.0.  The abdominal portion of the procedure was performed by Dr. Tad Arriaga MD with laparoscopic assistance.  See his note for  complete details of that portion of the procedure .  Of note as he was implanting the abdominal portion of the catheter I pumped  the valve and CSF was seen dripping easily from the distal end of the catheter and it appeared to be in good position.     Complications: None  Estimated blood loss: Less than 10 cc  Disposition: Admit to ICU  Implant: Strata Medtronic valve programmed to 2.0

## 2020-11-19 NOTE — ANESTHESIA PROCEDURE NOTES
Airway  Urgency: elective    Date/Time: 11/19/2020 1:25 PM  Airway not difficult    General Information and Staff    Patient location during procedure: OR  Anesthesiologist: Gerardo Kingston MD  CRNA: Miriam Wang CRNA    Indications and Patient Condition  Indications for airway management: airway protection    Preoxygenated: yes  Mask difficulty assessment: 1 - vent by mask    Final Airway Details  Final airway type: endotracheal airway      Successful airway: ETT  Cuffed: yes   Successful intubation technique: direct laryngoscopy  Facilitating devices/methods: intubating stylet  Endotracheal tube insertion site: oral  Blade: Cope  Blade size: 2  ETT size (mm): 7.0  Cormack-Lehane Classification: grade I - full view of glottis  Placement verified by: chest auscultation and capnometry   Cuff volume (mL): 8  Measured from: gums  ETT/EBT to gums (cm): 19  Number of attempts at approach: 1  Assessment: lips, teeth, and gum same as pre-op and atraumatic intubation

## 2020-11-19 NOTE — ANESTHESIA PREPROCEDURE EVALUATION
Anesthesia Evaluation     history of anesthetic complications: PONV               Airway   Mallampati: II  TM distance: >3 FB  Neck ROM: full  No difficulty expected  Dental    (+) upper dentures    Pulmonary - normal exam   (+) COPD, asthma,shortness of breath,   Cardiovascular - normal exam    Patient on routine beta blocker    (+) hypertension 2 medications or greater, hyperlipidemia,       Neuro/Psych  (+) dizziness/light headedness,       ROS Comment: Weakness  Diagnosed with normal pressure hydrocephalus.  GI/Hepatic/Renal/Endo    (+)  hiatal hernia, GERD,  renal disease CRI,     Musculoskeletal     Abdominal    Substance History       Comment: Recovering alcoholic   OB/GYN          Other                        Anesthesia Plan    ASA 3     general     intravenous induction     Anesthetic plan, all risks, benefits, and alternatives have been provided, discussed and informed consent has been obtained with: patient.

## 2020-11-19 NOTE — ANESTHESIA POSTPROCEDURE EVALUATION
"Patient: Mary Boyle    Procedure Summary     Date: 11/19/20 Room / Location: [facility] OR [address] [place] [address] MAIN OR    Anesthesia Start: 1309 Anesthesia Stop: 1553    Procedures:       Right sided VENTRICULAR PERITONEAL SHUNT INSERTION STERIOTACTIC (N/A )      LAPAROSCOPIC ASSISTED VENTRICULAR PERITONEAL SHUNT PLACEMENT (N/A ) Diagnosis:       NPH (normal pressure hydrocephalus) (CMS/HCC)      (NPH (normal pressure hydrocephalus) (CMS/HCC) [G91.2])    Surgeon: Xavi Silverio MD; Tad Arriaga MD Provider: Gerardo Kingston MD    Anesthesia Type: general ASA Status: 3          Anesthesia Type: general    Vitals  Vitals Value Taken Time   /73 11/19/20 1720   Temp 36.6 °C (97.9 °F) 11/19/20 1550   Pulse 86 11/19/20 1736   Resp 16 11/19/20 1720   SpO2 96 % 11/19/20 1737   Vitals shown include unvalidated device data.        Post Anesthesia Care and Evaluation    Patient location during evaluation: bedside  Patient participation: complete - patient participated  Level of consciousness: awake  Pain management: adequate  Airway patency: patent  Anesthetic complications: No anesthetic complications    Cardiovascular status: acceptable  Respiratory status: acceptable  Hydration status: acceptable    Comments: */73   Pulse 83   Temp 36.6 °C (97.9 °F) (Oral)   Resp 16   Ht 144.8 cm (57\")   Wt 53.3 kg (117 lb 9 oz)   SpO2 100%   BMI 25.44 kg/m²         "

## 2020-11-20 ENCOUNTER — APPOINTMENT (OUTPATIENT)
Dept: CT IMAGING | Facility: HOSPITAL | Age: 75
End: 2020-11-20

## 2020-11-20 LAB
ANION GAP SERPL CALCULATED.3IONS-SCNC: 10.1 MMOL/L (ref 5–15)
APTT PPP: 27.2 SECONDS (ref 22.7–35.4)
BASOPHILS # BLD AUTO: 0.01 10*3/MM3 (ref 0–0.2)
BASOPHILS NFR BLD AUTO: 0.1 % (ref 0–1.5)
BUN SERPL-MCNC: 32 MG/DL (ref 8–23)
BUN/CREAT SERPL: 19.9 (ref 7–25)
CALCIUM SPEC-SCNC: 8.7 MG/DL (ref 8.6–10.5)
CHLORIDE SERPL-SCNC: 102 MMOL/L (ref 98–107)
CO2 SERPL-SCNC: 21.9 MMOL/L (ref 22–29)
CREAT SERPL-MCNC: 1.61 MG/DL (ref 0.57–1)
DEPRECATED RDW RBC AUTO: 43.2 FL (ref 37–54)
EOSINOPHIL # BLD AUTO: 0 10*3/MM3 (ref 0–0.4)
EOSINOPHIL NFR BLD AUTO: 0 % (ref 0.3–6.2)
ERYTHROCYTE [DISTWIDTH] IN BLOOD BY AUTOMATED COUNT: 14 % (ref 12.3–15.4)
GFR SERPL CREATININE-BSD FRML MDRD: 31 ML/MIN/1.73
GLUCOSE BLDC GLUCOMTR-MCNC: 132 MG/DL (ref 70–130)
GLUCOSE SERPL-MCNC: 134 MG/DL (ref 65–99)
HCT VFR BLD AUTO: 32.8 % (ref 34–46.6)
HGB BLD-MCNC: 11 G/DL (ref 12–15.9)
IMM GRANULOCYTES # BLD AUTO: 0.05 10*3/MM3 (ref 0–0.05)
IMM GRANULOCYTES NFR BLD AUTO: 0.4 % (ref 0–0.5)
INR PPP: 1.19 (ref 0.9–1.1)
LYMPHOCYTES # BLD AUTO: 0.79 10*3/MM3 (ref 0.7–3.1)
LYMPHOCYTES NFR BLD AUTO: 6.9 % (ref 19.6–45.3)
MCH RBC QN AUTO: 28.7 PG (ref 26.6–33)
MCHC RBC AUTO-ENTMCNC: 33.5 G/DL (ref 31.5–35.7)
MCV RBC AUTO: 85.6 FL (ref 79–97)
MONOCYTES # BLD AUTO: 0.17 10*3/MM3 (ref 0.1–0.9)
MONOCYTES NFR BLD AUTO: 1.5 % (ref 5–12)
NEUTROPHILS NFR BLD AUTO: 10.35 10*3/MM3 (ref 1.7–7)
NEUTROPHILS NFR BLD AUTO: 91.1 % (ref 42.7–76)
NRBC BLD AUTO-RTO: 0.1 /100 WBC (ref 0–0.2)
PLATELET # BLD AUTO: 247 10*3/MM3 (ref 140–450)
PMV BLD AUTO: 10.1 FL (ref 6–12)
POTASSIUM SERPL-SCNC: 4.4 MMOL/L (ref 3.5–5.2)
PROTHROMBIN TIME: 14.9 SECONDS (ref 11.7–14.2)
RBC # BLD AUTO: 3.83 10*6/MM3 (ref 3.77–5.28)
SODIUM SERPL-SCNC: 134 MMOL/L (ref 136–145)
WBC # BLD AUTO: 11.37 10*3/MM3 (ref 3.4–10.8)

## 2020-11-20 PROCEDURE — 85025 COMPLETE CBC W/AUTO DIFF WBC: CPT | Performed by: NEUROLOGICAL SURGERY

## 2020-11-20 PROCEDURE — 94640 AIRWAY INHALATION TREATMENT: CPT

## 2020-11-20 PROCEDURE — 94799 UNLISTED PULMONARY SVC/PX: CPT

## 2020-11-20 PROCEDURE — 85610 PROTHROMBIN TIME: CPT | Performed by: NEUROLOGICAL SURGERY

## 2020-11-20 PROCEDURE — 80048 BASIC METABOLIC PNL TOTAL CA: CPT | Performed by: NEUROLOGICAL SURGERY

## 2020-11-20 PROCEDURE — 99024 POSTOP FOLLOW-UP VISIT: CPT | Performed by: PHYSICIAN ASSISTANT

## 2020-11-20 PROCEDURE — 97110 THERAPEUTIC EXERCISES: CPT

## 2020-11-20 PROCEDURE — 82962 GLUCOSE BLOOD TEST: CPT

## 2020-11-20 PROCEDURE — 97162 PT EVAL MOD COMPLEX 30 MIN: CPT

## 2020-11-20 PROCEDURE — 99024 POSTOP FOLLOW-UP VISIT: CPT | Performed by: SURGERY

## 2020-11-20 PROCEDURE — 25010000002 HYDRALAZINE PER 20 MG: Performed by: INTERNAL MEDICINE

## 2020-11-20 PROCEDURE — 70450 CT HEAD/BRAIN W/O DYE: CPT

## 2020-11-20 PROCEDURE — 85730 THROMBOPLASTIN TIME PARTIAL: CPT | Performed by: NEUROLOGICAL SURGERY

## 2020-11-20 RX ADMIN — HYDRALAZINE HYDROCHLORIDE 10 MG: 20 INJECTION, SOLUTION INTRAMUSCULAR; INTRAVENOUS at 20:11

## 2020-11-20 RX ADMIN — HYDRALAZINE HYDROCHLORIDE 50 MG: 50 TABLET, FILM COATED ORAL at 21:11

## 2020-11-20 RX ADMIN — LABETALOL HYDROCHLORIDE 10 MG: 5 INJECTION, SOLUTION INTRAVENOUS at 17:03

## 2020-11-20 RX ADMIN — IPRATROPIUM BROMIDE AND ALBUTEROL SULFATE 1.5 ML: 2.5; .5 SOLUTION RESPIRATORY (INHALATION) at 20:12

## 2020-11-20 RX ADMIN — HYDRALAZINE HYDROCHLORIDE 50 MG: 50 TABLET, FILM COATED ORAL at 06:25

## 2020-11-20 RX ADMIN — HYDRALAZINE HYDROCHLORIDE 50 MG: 50 TABLET, FILM COATED ORAL at 13:53

## 2020-11-20 RX ADMIN — ACETAMINOPHEN 650 MG: 325 TABLET, FILM COATED ORAL at 15:29

## 2020-11-20 RX ADMIN — PANTOPRAZOLE SODIUM 40 MG: 40 TABLET, DELAYED RELEASE ORAL at 09:58

## 2020-11-20 NOTE — PROGRESS NOTES
Chief Complaint:    POD 1, S/P placement of ventriculoperitoneal shunt for normal pressure hydrocephalus    Subjective:    Feels fine.  No headache.  Wants to eat.    Objective:    Vitals:    11/20/20 0730 11/20/20 0745 11/20/20 0800 11/20/20 0815   BP: 137/67 116/65 128/66 134/73   Pulse: 80 80 80 78   Resp:       Temp:       TempSrc:       SpO2: 94% 95% 94% 94%   Weight:       Height:           Lungs: Clear  Heart: Regular  Abdomen: Incisions look okay. BS present.   Extremities: Warm    Labs reviewed.  WBC 11.37, Hgb 11.0, platelets 247.  Creat 1.61.    Assessment:    POD 1, S/P placement of ventriculoperitoneal shunt for normal pressure hydrocephalus    Plan:    Begin regular diet.  She reports no prior dietary restrictions.  From my standpoint, she may go home when she is cleared by neurosurgery for discharge.

## 2020-11-20 NOTE — PROGRESS NOTES
[facility] NEUROSURGERY PROGRESS NOTE    PATIENT IDENTIFICATION:   Name:  Mary Boyle      MRN:  [id number]     75 y.o.  female               CC: POD 1- shunt placement      Subjective     Interval History: Patient is doing well today she is sitting up in bed enjoying breakfast.  She has no complaints this morning, she denies headache, nausea or vomiting, fever.    ROS:  Constitutional: No fever, chills  HEENT: No headache, no vision changes, no tinnitus, no hearing difficulties  GI: No nausea, vomiting, no swallow difficulties  Neuro: No numbness, tingling, or weakness, no speech difficulties    Objective     Vital signs in last 24 hours:  Temp:  [97.9 °F (36.6 °C)-99.7 °F (37.6 °C)] 98.9 °F (37.2 °C)  Heart Rate:  [73-94] 86  Resp:  [13-18] 16  BP: (116-173)/() 137/99      Intake/Output this shift:  No intake/output data recorded.      Intake/Output last 3 shifts:  I/O last 3 completed shifts:  In: 1097 [I.V.:1097]  Out: 450 [Urine:450]    LABS:  Results from last 7 days   Lab Units 11/20/20  0644 11/14/20  0450   WBC 10*3/mm3 11.37* 11.61*   HEMOGLOBIN g/dL 11.0* 13.4   HEMATOCRIT % 32.8* 40.6   PLATELETS 10*3/mm3 247 326     Results from last 7 days   Lab Units 11/20/20  0644 11/14/20  0450   SODIUM mmol/L 134* 134*   POTASSIUM mmol/L 4.4 4.2   CHLORIDE mmol/L 102 98   CO2 mmol/L 21.9* 21.8*   BUN mg/dL 32* 27*   CREATININE mg/dL 1.61* 1.31*   CALCIUM mg/dL 8.7 9.6   GLUCOSE mg/dL 134* 112*          IMAGING STUDIES:  CT head 11/20-postoperative changes noted- shunt entering right posterior parietal approach.   shunt terminates within right lateral ventricle adjacent to the falx.  No evidence of large intraventricular hemorrhage.    I personally viewed and interpreted the patient's CT head, and discussed the imaging with Dr. Silverio.    Meds reviewed/changed: Yes    Current Facility-Administered Medications:   •  acetaminophen (TYLENOL) tablet 650 mg, 650 mg, Oral, Q4H PRN **OR** acetaminophen  (TYLENOL) suppository 650 mg, 650 mg, Rectal, Q4H PRN, Xavi Silverio MD  •  allopurinol (ZYLOPRIM) tablet 100 mg, 100 mg, Oral, Daily, Xavi Silverio MD, 100 mg at 11/19/20 2238  •  amLODIPine (NORVASC) tablet 10 mg, 10 mg, Oral, Q24H, Xavi Silverio MD, 10 mg at 11/19/20 2239  •  hydrALAZINE (APRESOLINE) injection 10 mg, 10 mg, Intravenous, Q6H PRN, Blaire Hendricks MD, 10 mg at 11/19/20 1900  •  hydrALAZINE (APRESOLINE) tablet 50 mg, 50 mg, Oral, Q8H, Xavi Silverio MD, 50 mg at 11/20/20 0625  •  HYDROcodone-acetaminophen (NORCO) 7.5-325 MG per tablet 2 tablet, 2 tablet, Oral, Q4H PRN, Xavi Silverio MD, 1 tablet at 11/19/20 2223  •  hydrocortisone (ANUSOL-HC) suppository 25 mg, 25 mg, Rectal, BID, Xavi Silverio MD, 25 mg at 11/19/20 2240  •  ipratropium-albuterol (DUO-NEB) nebulizer solution 1.5 mL, 1.5 mL, Nebulization, Q6H PRN, Xavi Silverio MD, 1.5 mL at 11/19/20 2320  •  labetalol (NORMODYNE,TRANDATE) injection 10 mg, 10 mg, Intravenous, Q4H PRN, Blaire Hendricks MD, 10 mg at 11/19/20 2216  •  lactated ringers infusion, 9 mL/hr, Intravenous, Continuous, Chelsi Mccord MD, Last Rate: 9 mL/hr at 11/19/20 1020, Currently Infusing at 11/19/20 1313  •  metoprolol succinate XL (TOPROL-XL) 24 hr tablet 50 mg, 50 mg, Oral, Daily, Xavi Silverio MD, 50 mg at 11/19/20 2239  •  morphine injection 2 mg, 2 mg, Intravenous, Q4H PRN **AND** naloxone (NARCAN) injection 0.4 mg, 0.4 mg, Intravenous, Q5 Min PRN, Xavi Silverio MD  •  niCARdipine (CARDENE) 25 mg in 250 mL NS infusion kit, 5-15 mg/hr, Intravenous, Titrated, Xavi Silverio MD, Stopped at 11/20/20 0900  •  ondansetron (ZOFRAN) tablet 4 mg, 4 mg, Oral, Q6H PRN **OR** ondansetron (ZOFRAN) injection 4 mg, 4 mg, Intravenous, Q6H PRN, Xavi Silverio MD, 4 mg at 11/19/20 2215  •  pantoprazole (PROTONIX) EC tablet 40 mg, 40 mg, Oral, Daily, Xavi Silverio MD, 40 mg at 11/20/20 0958  •  sodium chloride 0.45 % with KCl 20 mEq/L  "infusion, 75 mL/hr, Intravenous, Continuous, Xavi Silverio MD, Last Rate: 75 mL/hr at 11/19/20 2223, 75 mL/hr at 11/19/20 2223      Physical Exam:    General:   Awake, alert, oriented x3. Speech clear with no aphasia.  Sitting up in bed enjoying breakfast.  HEENT:    Cranial and abdominal incision-no redness, swelling, drainage.    CN III IV VI: Extraocular movements are full , PERRL 3 mm brisk  CN V: Normal facial sensation   CN VII: Facial movements are symmetric. No weakness.      Motor: No drift.  Moves all 4 extremities normally.  No fasciculations, rigidity, spasticity, or abnormal movements.  Reflexes: 2+ in the upper and lower extremities.  Sensation: Normal to light touch; no extinction    Coordination: Finger-to-nosetest shows no dysmetria.       Assessment/Plan     ASSESSMENT:      NPH (normal pressure hydrocephalus) (CMS/[facility])      PLAN: Patient is doing very well postop day 1 from her  shunt placement.  She does not exhibit any deficits at this time.  Neurosurgical standpoint we are okay with discharging her back to [facility].  There is some concern that there may need to be a research process for her to return to the rehab center.   is working with the daughter on this.  If that is the case we will plan to discharge patient over the weekend or Monday.  Otherwise we will do discharge orders today.    I discussed the patient's findings and my recommendations with patient, nursing staff and Dr. Silverio       LOS: 1 day       Elizabeth Magdaleno PA-C  11/20/2020  09:42 EST    \"Dictated utilizing Dragon dictation\".      "

## 2020-11-20 NOTE — PROGRESS NOTES
Chart reviewed and discussed with the nurse and noted plan for discharge today if bed is available.  No acute issues currently and okay to discharge from my standpoint.

## 2020-11-20 NOTE — PROGRESS NOTES
Continued Stay Note  [facility]     Patient Name: Mary Boyle  MRN: [id number]  Today's Date: 11/20/2020    Admit Date: 11/19/2020    Discharge Plan     Row Name 11/20/20 0924       Plan    Plan  Return to [facility]    Plan Comments  Spoke to Brianna  Pt daughter Sakina request she branden returned to [facility].  Plans for DC today  Brianna will check with facility to see if she needs pre cert to return.        Discharge Codes    No documentation.             Lena Montgomery RN

## 2020-11-20 NOTE — THERAPY EVALUATION
Patient Name: Mary Boyle  : 1945    MRN: [id number]                              Today's Date: 2020       Admit Date: 2020    Visit Dx: No diagnosis found.  Patient Active Problem List   Diagnosis   • Chest pain   • Colon polyp   • COPD (chronic obstructive pulmonary disease) (CMS/HCC)   • Diverticulosis   • Essential hypertension   • GERD without esophagitis   • Hypercholesterolemia   • Osteopenia   • Multiple thyroid nodules   • Chronic fatigue   • Prediabetes   • Vitamin D deficiency   • Renal insufficiency   • Leg swelling   • Hyperuricemia   • SOB (shortness of breath)   • Generalized weakness   • NPH (normal pressure hydrocephalus) (CMS/HCC)     Past Medical History:   Diagnosis Date   • Arthritis    • Asthma    • Chest pain    • Depression    • Dizziness 2020    Went to ER with dizziness and generalized weakness--found to have hydrocephalus.    • Ganglion cyst     right leg   • GERD (gastroesophageal reflux disease)    • Gout    • Hiatal hernia    • Hypertension    • Hypokalemia    • Mild mitral regurgitation    • Normal pressure hydrocephalus (CMS/HCC)    • Personality disorder (CMS/HCC)     PER NOTE IN EPIC    • PONV (postoperative nausea and vomiting)    • Recovering alcoholic (CMS/HCC)     SOBER SINCE    • Thyroid nodule    • Tricuspid regurgitation    • Vitamin D deficiency      Past Surgical History:   Procedure Laterality Date   • APPENDECTOMY     • BREAST LUMPECTOMY     • CHOLECYSTECTOMY     • GANGLION CYST EXCISION     • HERNIA REPAIR     • HYSTERECTOMY     •  SHUNT INSERTION N/A 2020    Procedure: Right sided VENTRICULAR PERITONEAL SHUNT INSERTION STERIOTACTIC;  Surgeon: Xavi Silverio MD;  Location: [facility];  Service: Neurosurgery;  Laterality: N/A;     General Information     Row Name 20 1350          Physical Therapy Time and Intention    Document Type  evaluation  -AR     Mode of Treatment  physical therapy  -AR     Row Name 20  1350          General Information    Patient Profile Reviewed  yes  -AR     Prior Level of Function  min assist: admit from rehab/SNU, working on walking with RW,  -AR     Existing Precautions/Restrictions  fall  -AR     Barriers to Rehab  none identified  -AR     Row Name 11/20/20 1350          Living Environment    Lives With  -- admit from rehab at SNU  -AR     Row Name 11/20/20 1350          Cognition    Orientation Status (Cognition)  oriented x 3  -AR     Row Name 11/20/20 1350          Safety Issues, Functional Mobility    Impairments Affecting Function (Mobility)  balance;endurance/activity tolerance;strength;pain  -AR       User Key  (r) = Recorded By, (t) = Taken By, (c) = Cosigned By    Initials Name Provider Type    AR Sima Pope PT Physical Therapist        Mobility     Row Name 11/20/20 1351          Bed Mobility    Bed Mobility  supine-sit;sit-supine  -AR     Supine-Sit Hasty (Bed Mobility)  minimum assist (75% patient effort)  -AR     Sit-Supine Hasty (Bed Mobility)  standby assist  -AR     Assistive Device (Bed Mobility)  bed rails;head of bed elevated  -AR     Row Name 11/20/20 1351          Transfers    Comment (Transfers)  cues for UE placement  -AR     Row Name 11/20/20 1351          Sit-Stand Transfer    Sit-Stand Hasty (Transfers)  contact guard  -AR     Assistive Device (Sit-Stand Transfers)  walker, front-wheeled  -AR     Row Name 11/20/20 1351          Gait/Stairs (Locomotion)    Hasty Level (Gait)  minimum assist (75% patient effort)  -AR     Assistive Device (Gait)  walker, front-wheeled  -AR     Distance in Feet (Gait)  25' slow shuffling steps, limited by fatigue, pain.  -AR     Deviations/Abnormal Patterns (Gait)  antalgic;festinating/shuffling;jamari decreased;stride length decreased  -AR     Bilateral Gait Deviations  heel strike decreased;forward flexed posture  -AR       User Key  (r) = Recorded By, (t) = Taken By, (c) = Cosigned By    Initials  Name Provider Type    AR Sima Pope, PT Physical Therapist        Obj/Interventions     Row Name 11/20/20 135          Range of Motion Comprehensive    Comment, General Range of Motion  B LE WFL  -AR     Row Name 11/20/20 135          Strength Comprehensive (MMT)    Comment, General Manual Muscle Testing (MMT) Assessment  B LE -4/5  -AR     Row Name 11/20/20 1353          Motor Skills    Therapeutic Exercise  -- B AP, lAQ 10x  -AR     Row Name 11/20/20 1353          Balance    Balance Assessment  sitting static balance;standing static balance  -AR     Static Sitting Balance  WNL  -AR     Static Standing Balance  mild impairment  -AR       User Key  (r) = Recorded By, (t) = Taken By, (c) = Cosigned By    Initials Name Provider Type    AR Sima Pope, PT Physical Therapist        Goals/Plan     Row Name 11/20/20 [facility]          Bed Mobility Goal 1 (PT)    Activity/Assistive Device (Bed Mobility Goal 1, PT)  sit to supine/supine to sit  -AR     [place] Level/Cues Needed (Bed Mobility Goal 1, PT)  modified independence  -AR     Time Frame (Bed Mobility Goal 1, PT)  1 week  -AR     Row Name 11/20/20 [facility]          Transfer Goal 1 (PT)    Activity/Assistive Device (Transfer Goal 1, PT)  sit-to-stand/stand-to-sit;walker, rolling  -AR     [place] Level/Cues Needed (Transfer Goal 1, PT)  standby assist  -AR     Time Frame (Transfer Goal 1, PT)  1 week  -AR     Row Name 11/20/20 [facility]9          Gait Training Goal 1 (PT)    Activity/Assistive Device (Gait Training Goal 1, PT)  gait (walking locomotion)  -AR     [place] Level (Gait Training Goal 1, PT)  contact guard assist  -AR     Distance (Gait Training Goal 1, PT)  150  -AR     Time Frame (Gait Training Goal 1, PT)  1 week  -AR       User Key  (r) = Recorded By, (t) = Taken By, (c) = Cosigned By    Initials Name Provider Type    AR Sima Pope, PT Physical Therapist        Clinical Impression     Row Name 11/20/20 1355          Pain    Additional  Documentation  Pain Scale: Numbers Pre/Post-Treatment (Group)  -AR     Row Name 11/20/20 6903          Pain Scale: Numbers Pre/Post-Treatment    Pretreatment Pain Rating  3/10  -AR     Posttreatment Pain Rating  4/10  -AR     Pain Location - Orientation  incisional  -AR     Pain Location  abdomen  -AR     Pain Intervention(s)  Medication (See MAR);Repositioned  -AR     Row Name 11/20/20 9603          Plan of Care Review    Plan of Care Reviewed With  patient;daughter  -AR     Outcome Summary  Pt admitted 11/19 with hydrocephalus, s/p shunt 11/19.  [facility], reports working on walking with walker.  Today pt demonstrates impaired activty tolerance, gait pattern, and ind. w/ mobility but able to ambulate 25' w/ min A using RW; few cues for improve gait pattern.  Currently recommend DC back to [facility].  -AR     Row Name 11/20/20 5448          Therapy Assessment/Plan (PT)    Rehab Potential (PT)  good, to achieve stated therapy goals  -AR     Criteria for Skilled Interventions Met (PT)  yes  -AR     Row Name 11/20/20 9253          Vital Signs    Pre SpO2 (%)  98  -AR     O2 Delivery Pre Treatment  room air  -AR     O2 Delivery Intra Treatment  room air  -AR     Post SpO2 (%)  97  -AR     O2 Delivery Post Treatment  room air  -AR     Recovery Time  vitals stable before and afer PT  -AR     Row Name 11/20/20 1526          Positioning and Restraints    Pre-Treatment Position  in bed no alarm on arrival  -AR     Post Treatment Position  bed  -AR     In Bed  notified nsg;supine;call light within reach;encouraged to call for assist;with family/caregiver  -AR       User Key  (r) = Recorded By, (t) = Taken By, (c) = Cosigned By    Initials Name Provider Type    Sima Cordova, PT Physical Therapist        Outcome Measures     Row Name 11/20/20 0586          How much help from another person do you currently need...    Turning from your back to your side while in flat bed without using bedrails?  3  -AR      Moving from lying on back to sitting on the side of a flat bed without bedrails?  2  -AR     Moving to and from a bed to a chair (including a wheelchair)?  3  -AR     Standing up from a chair using your arms (e.g., wheelchair, bedside chair)?  3  -AR     Climbing 3-5 steps with a railing?  2  -AR     To walk in hospital room?  3  -AR     AM-PAC 6 Clicks Score (PT)  16  -AR     Row Name 11/20/20 [facility]          Functional Assessment    Outcome Measure Options  AM-PAC 6 Clicks Basic Mobility (PT)  -AR       User Key  (r) = Recorded By, (t) = Taken By, (c) = Cosigned By    Initials Name Provider Type    AR Sima Pope, PT Physical Therapist        Physical Therapy Education                 Title: PT OT SLP Therapies (In Progress)     Topic: Physical Therapy (In Progress)     Point: Mobility training (In Progress)     Learning Progress Summary           Patient Acceptance, E, NR by AR at 11/20/2020 1356                   Point: Home exercise program (In Progress)     Learning Progress Summary           Patient Acceptance, E, NR by AR at 11/20/2020 1356                   Point: Body mechanics (In Progress)     Learning Progress Summary           Patient Acceptance, E, NR by AR at 11/20/2020 1356                   Point: Precautions (In Progress)     Learning Progress Summary           Patient Acceptance, E, NR by AR at 11/20/2020 1356                               User Key     Initials Effective Dates Name Provider Type Discipline    AR 04/03/18 -  Sima Pope, PT Physical Therapist PT              PT Recommendation and Plan  Planned Therapy Interventions (PT): balance training, bed mobility training, gait training, home exercise program, patient/family education, transfer training, ROM (range of motion), stair training, strengthening  Plan of Care Reviewed With: patient, daughter  Outcome Summary: Pt admitted 11/19 with hydrocephalus, s/p shunt 11/19.  Pt admitted from rehab at [facility], reports working on walking  with walker.  Today pt demonstrates impaired activty tolerance, gait pattern, and ind. w/ mobility but able to ambulate 25' w/ min A using RW; few cues for improve gait pattern.  Currently recommend DC back to SNU.     Time Calculation:   PT Charges     Row Name 11/20/20 1349             Time Calculation    Start Time  1321  -AR      Stop Time  1349  -AR      Time Calculation (min)  28 min  -AR      PT Received On  11/20/20  -AR      PT - Next Appointment  11/22/20  -AR      PT Goal Re-Cert Due Date  11/27/20  -AR        User Key  (r) = Recorded By, (t) = Taken By, (c) = Cosigned By    Initials Name Provider Type    AR Sima Pope, GONZALO Physical Therapist        Therapy Charges for Today     Code Description Service Date Service Provider Modifiers Qty    65321898134 HC PT EVAL MOD COMPLEXITY 2 11/20/2020 Sima Pope, PT GP 1    40312895466 HC PT THER PROC EA 15 MIN 11/20/2020 Sima Pope, PT GP 1    29292193956 HC PT THER SUPP EA 15 MIN 11/20/2020 Sima Pope, PT GP 2          PT G-Codes  Outcome Measure Options: AM-PAC 6 Clicks Basic Mobility (PT)  AM-PAC 6 Clicks Score (PT): 16    Sima Pope PT  11/20/2020

## 2020-11-20 NOTE — PLAN OF CARE
Goal Outcome Evaluation:  Plan of Care Reviewed With: patient, daughter    Patient remains in the ICU waiting for her bed to be open at her rehab facility so that she can return there.  She has had no complaints of pain, dizziness, or nausea today, and has been maintaining a systolic <150, except with activity at times.  She is eating and tolerating meals, Worked with PT, and is resting in room with daughter at bedside with no visible distress or needs at this time.

## 2020-11-20 NOTE — PLAN OF CARE
Problem: Adult Inpatient Plan of Care  Goal: Plan of Care Review  Recent Flowsheet Documentation  Taken 11/20/2020 1353 by Sima Pope PT  Plan of Care Reviewed With:   patient   daughter  Outcome Summary:   Pt admitted 11/19 with hydrocephalus, s/p shunt 11/19.  Pt admitted from rehab at SNU, reports working on walking with walker.  Today pt demonstrates impaired activty tolerance, gait pattern, and ind. w/ mobility but able to ambulate 25' w/ min A using RW   few cues for improve gait pattern.  Currently recommend DC back to SNU.       ..Patient was intermittently wearing a face mask during this therapy encounter. Therapist used appropriate personal protective equipment including eye protection, mask, and gloves.  Mask used was standard procedure mask. Appropriate PPE was worn during the entire therapy session. Hand hygiene was completed before and after therapy session. Patient is not in enhanced droplet precautions. Melina Joseph present.

## 2020-11-20 NOTE — DISCHARGE PLACEMENT REQUEST
"Mary Boyle (75 y.o. Female)     Date of Birth Social Security Number Address Home Phone MRN    1945  [address] [place] [id number] [phone] [id number]    Taoist Marital Status          Sabianist        Admission Date Admission Type Admitting Provider Attending Provider Department, Room/Bed    11/19/20 Elective Xavi Silverio MD Todnem, Nathaniel, MD [facility] INTENSIVE CARE, I376/1    Discharge Date Discharge Disposition Discharge Destination                       Attending Provider: Xavi Silverio MD    Allergies: Iodinated Diagnostic Agents, Aspirin, Doxycycline, Sertraline    Isolation: None   Infection: COVID Screen (preop/placement) (11/13/20)   Code Status: Prior    Ht: 147.3 cm (58\")   Wt: 53.3 kg (117 lb 9 oz)    Admission Cmt: None   Principal Problem: NPH (normal pressure hydrocephalus) (CMS/[facility]) [G91.2]                 Active Insurance as of 11/19/2020     Primary Coverage     Payor Plan Insurance Group Employer/Plan Group    HUMANA MEDICARE REPLACEMENT HUMANA MEDICARE REPLACEMENT [id number]     Payor Plan Address Payor Plan Phone Number Payor Plan Fax Number Effective Dates    [address] [address] [phone]  1/1/2018 - None Entered    [facility] [phone]       Subscriber Name Subscriber Birth Date Member ID       MARY BOYLE 1945 [id number]                 Emergency Contacts      (Rel.) Home Phone Work Phone Mobile Phone    Sakina Hale (Daughter) [phone] -- --              "

## 2020-11-20 NOTE — PROGRESS NOTES
Continued Stay Note  [facility]     Patient Name: Mary Boyle  MRN: [id number]  Today's Date: 11/20/2020    Admit Date: 11/19/2020    Discharge Plan     Row Name 11/20/20 1622       Plan    Plan  [facility] pending pre cert    Plan Comments  CCP spoke with onsite [facility] nurse Sandra.  She has forwarded all clinicals to [facility] for pre cert .  Pt has participated in Physicial therapy.  Brianna lawrence.  Message to weekend staff to watch for pre cert to DC  Daughter Sakina will transport patient.        Discharge Codes    No documentation.             Lena Montgomery RN

## 2020-11-21 LAB
GLUCOSE BLDC GLUCOMTR-MCNC: 121 MG/DL (ref 70–130)
GLUCOSE BLDC GLUCOMTR-MCNC: 144 MG/DL (ref 70–130)
GLUCOSE BLDC GLUCOMTR-MCNC: 96 MG/DL (ref 70–130)

## 2020-11-21 PROCEDURE — 99024 POSTOP FOLLOW-UP VISIT: CPT | Performed by: SURGERY

## 2020-11-21 PROCEDURE — 25010000002 HYDRALAZINE PER 20 MG: Performed by: NEUROLOGICAL SURGERY

## 2020-11-21 PROCEDURE — 94799 UNLISTED PULMONARY SVC/PX: CPT

## 2020-11-21 PROCEDURE — 82962 GLUCOSE BLOOD TEST: CPT

## 2020-11-21 PROCEDURE — 99024 POSTOP FOLLOW-UP VISIT: CPT | Performed by: PHYSICIAN ASSISTANT

## 2020-11-21 PROCEDURE — 25010000002 HYDRALAZINE PER 20 MG: Performed by: INTERNAL MEDICINE

## 2020-11-21 PROCEDURE — 63710000001 ONDANSETRON PER 8 MG: Performed by: NEUROLOGICAL SURGERY

## 2020-11-21 RX ORDER — POLYETHYLENE GLYCOL 3350 17 G/17G
17 POWDER, FOR SOLUTION ORAL DAILY
Status: DISCONTINUED | OUTPATIENT
Start: 2020-11-22 | End: 2020-11-22 | Stop reason: HOSPADM

## 2020-11-21 RX ORDER — IPRATROPIUM BROMIDE AND ALBUTEROL SULFATE 2.5; .5 MG/3ML; MG/3ML
SOLUTION RESPIRATORY (INHALATION)
Status: DISPENSED
Start: 2020-11-21 | End: 2020-11-22

## 2020-11-21 RX ORDER — CALCIUM CARBONATE 200(500)MG
2 TABLET,CHEWABLE ORAL 3 TIMES DAILY PRN
Status: DISCONTINUED | OUTPATIENT
Start: 2020-11-21 | End: 2020-11-22 | Stop reason: HOSPADM

## 2020-11-21 RX ADMIN — METOPROLOL SUCCINATE 50 MG: 50 TABLET, EXTENDED RELEASE ORAL at 08:16

## 2020-11-21 RX ADMIN — AMLODIPINE BESYLATE 10 MG: 10 TABLET ORAL at 08:16

## 2020-11-21 RX ADMIN — LABETALOL HYDROCHLORIDE 10 MG: 5 INJECTION, SOLUTION INTRAVENOUS at 01:36

## 2020-11-21 RX ADMIN — PANTOPRAZOLE SODIUM 40 MG: 40 TABLET, DELAYED RELEASE ORAL at 08:16

## 2020-11-21 RX ADMIN — HYDRALAZINE HYDROCHLORIDE 10 MG: 20 INJECTION, SOLUTION INTRAMUSCULAR; INTRAVENOUS at 06:20

## 2020-11-21 RX ADMIN — ANTACID TABLETS 2 TABLET: 500 TABLET, CHEWABLE ORAL at 10:52

## 2020-11-21 RX ADMIN — ACETAMINOPHEN 650 MG: 325 TABLET, FILM COATED ORAL at 17:26

## 2020-11-21 RX ADMIN — HYDRALAZINE HYDROCHLORIDE 50 MG: 50 TABLET, FILM COATED ORAL at 13:31

## 2020-11-21 RX ADMIN — HYDRALAZINE HYDROCHLORIDE 10 MG: 20 INJECTION, SOLUTION INTRAMUSCULAR; INTRAVENOUS at 17:39

## 2020-11-21 RX ADMIN — HYDRALAZINE HYDROCHLORIDE 50 MG: 50 TABLET, FILM COATED ORAL at 05:51

## 2020-11-21 RX ADMIN — ALLOPURINOL 100 MG: 100 TABLET ORAL at 08:16

## 2020-11-21 RX ADMIN — ONDANSETRON HYDROCHLORIDE 4 MG: 4 TABLET, FILM COATED ORAL at 17:39

## 2020-11-21 RX ADMIN — HYDRALAZINE HYDROCHLORIDE 50 MG: 50 TABLET, FILM COATED ORAL at 21:37

## 2020-11-21 RX ADMIN — IPRATROPIUM BROMIDE AND ALBUTEROL SULFATE 1.5 ML: 2.5; .5 SOLUTION RESPIRATORY (INHALATION) at 06:06

## 2020-11-21 NOTE — PROGRESS NOTES
[facility] NEUROSURGERY PROGRESS NOTE    PATIENT IDENTIFICATION:   Name:  Mary Boyle      MRN:  [id number]     75 y.o.  female               CC: POD 2- shunt placement      Subjective     Interval History: Patient is doing well, and has no complaints this morning.  She denies headache.  She does report that she has some nausea earlier, but that eating helps.  She is anxious to discharge back to her rehab facility.    ROS:  Constitutional: No fever, chills  HEENT: No headache, no vision changes, no tinnitus, no hearing difficulties  GI: Nausea, no vomiting, no swallow difficulties  Neuro: No numbness, tingling, or weakness, no speech difficulties, no balance difficulties    Objective     Vital signs in last 24 hours:  Temp:  [98.6 °F (37 °C)-98.9 °F (37.2 °C)] 98.8 °F (37.1 °C)  Heart Rate:  [77-99] 96  Resp:  [15-17] 15  BP: (124-161)/() 152/105      Intake/Output this shift:  No intake/output data recorded.      Intake/Output last 3 shifts:  I/O last 3 completed shifts:  In: 747 [P.O.:200; I.V.:547]  Out: 975 [Urine:975]    LABS:  Results from last 7 days   Lab Units 11/20/20  0644   WBC 10*3/mm3 11.37*   HEMOGLOBIN g/dL 11.0*   HEMATOCRIT % 32.8*   PLATELETS 10*3/mm3 247     Results from last 7 days   Lab Units 11/20/20  0644   SODIUM mmol/L 134*   POTASSIUM mmol/L 4.4   CHLORIDE mmol/L 102   CO2 mmol/L 21.9*   BUN mg/dL 32*   CREATININE mg/dL 1.61*   CALCIUM mg/dL 8.7   GLUCOSE mg/dL 134*          IMAGING STUDIES:  No new imaging to review    I personally viewed and interpreted the patient's chart.    Meds reviewed/changed: Yes    Current Facility-Administered Medications:   •  acetaminophen (TYLENOL) tablet 650 mg, 650 mg, Oral, Q4H PRN, 650 mg at 11/20/20 1529 **OR** acetaminophen (TYLENOL) suppository 650 mg, 650 mg, Rectal, Q4H PRN, Xavi Silverio MD  •  allopurinol (ZYLOPRIM) tablet 100 mg, 100 mg, Oral, Daily, Xavi Silverio MD, 100 mg at 11/21/20 0816  •  amLODIPine (NORVASC) tablet 10  mg, 10 mg, Oral, Q24H, Xavi Silverio MD, 10 mg at 11/21/20 0816  •  calcium carbonate (TUMS) chewable tablet 500 mg (200 mg elemental), 2 tablet, Oral, TID PRN, Marcelo Clinton MD, 2 tablet at 11/21/20 1052  •  hydrALAZINE (APRESOLINE) injection 10 mg, 10 mg, Intravenous, Q6H PRN, Xavi Silverio MD, 10 mg at 11/21/20 0620  •  hydrALAZINE (APRESOLINE) tablet 50 mg, 50 mg, Oral, Q8H, Xavi Silverio MD, 50 mg at 11/21/20 0551  •  HYDROcodone-acetaminophen (NORCO) 7.5-325 MG per tablet 2 tablet, 2 tablet, Oral, Q4H PRN, Xavi Silverio MD, 1 tablet at 11/19/20 2223  •  ipratropium-albuterol (DUO-NEB) nebulizer solution 1.5 mL, 1.5 mL, Nebulization, Q6H PRN, Xavi Silverio MD, 1.5 mL at 11/21/20 0606  •  labetalol (NORMODYNE,TRANDATE) injection 10 mg, 10 mg, Intravenous, Q4H PRN, Xavi Silverio MD, 10 mg at 11/21/20 0136  •  lactated ringers infusion, 9 mL/hr, Intravenous, Continuous, Xavi Silverio MD, Last Rate: 9 mL/hr at 11/19/20 1020, Currently Infusing at 11/19/20 1313  •  metoprolol succinate XL (TOPROL-XL) 24 hr tablet 50 mg, 50 mg, Oral, Daily, Xavi Silverio MD, 50 mg at 11/21/20 0816  •  morphine injection 2 mg, 2 mg, Intravenous, Q4H PRN **AND** naloxone (NARCAN) injection 0.4 mg, 0.4 mg, Intravenous, Q5 Min PRN, Xavi Silverio MD  •  niCARdipine (CARDENE) 25 mg in 250 mL NS infusion kit, 5-15 mg/hr, Intravenous, Titrated, Xavi Silverio MD, Stopped at 11/20/20 0900  •  ondansetron (ZOFRAN) tablet 4 mg, 4 mg, Oral, Q6H PRN **OR** ondansetron (ZOFRAN) injection 4 mg, 4 mg, Intravenous, Q6H PRN, Xavi Silverio MD, 4 mg at 11/19/20 2215  •  pantoprazole (PROTONIX) EC tablet 40 mg, 40 mg, Oral, Daily, Xavi Silverio MD, 40 mg at 11/21/20 0816  •  sodium chloride 0.45 % with KCl 20 mEq/L infusion, 75 mL/hr, Intravenous, Continuous, Xavi Silverio MD, Last Rate: 75 mL/hr at 11/19/20 2223, 75 mL/hr at 11/19/20 2223      Physical Exam:    General:   Awake,  "alert, oriented x3. Speech clear with no aphasia  HEENT:    Cranium abdominal incisions both healing well-no redness, swelling, drainage.    CN III IV VI: Extraocular movements are full , PERRL 3 mm brisk  CN V: Normal facial sensation   CN VII: Facial movements are symmetric. No weakness.      Motor: No drift.  Normal strength in all 4 extremities.  No fasciculations, rigidity, spasticity, or abnormal movements.  Reflexes: 2+ in the upper and lower extremities.   Sensation: Normal to light touch; no extinction  Station and Gait: Per PT note 11/20: Today pt demonstrates impaired activty tolerance, gait pattern, and ind. w/ mobility but able to ambulate 25' w/ min A using RW  Coordination: Finger-to-nose test shows no dysmetria.        Assessment/Plan     ASSESSMENT:      NPH (normal pressure hydrocephalus) (CMS/HCC)      PLAN: Patient is doing very well following her  shunt placement.  She does not exhibit any deficits at this time from a neurosurgical standpoint she can be discharged back to her rehab facility once it is approved.  We are okay with her transferring to the floor today.  Neurosurgery will continue to follow until discharge.    I discussed the patient's findings and my recommendations with patient, nursing staff and Dr. Rodriguez       LOS: 2 days       Elizabeth Magdaleno PA-C  11/21/2020  08:49 EST    \"Dictated utilizing Dragon dictation\".      "

## 2020-11-21 NOTE — PROGRESS NOTES
[facility] INPATIENT PROGRESS NOTE         [facility] INTENSIVE CARE    2020      PATIENT IDENTIFICATION:  Name: Mary Boyle ADMIT: 2020   : 1945  PCP: Babar Jones MD    MRN: [id number] LOS: 2 days   AGE/SEX: 75 y.o. female  ROOM: [facility]                     LOS 2    Reason for visit: Follow-up ICU medical management status post  shunt 2020      SUBJECTIVE:      Resting comfortably.  Doing well.  Discussed with nursing staff at bedside.  Tolerating p.o. diet.  Okay to transfer out of intensive care unit from my standpoint.  Working on pre-CERT for rehab.  Suspect that this will likely not occur over the weekend however.    I am seeing the patient for the first time today.  All patient problems are new to me.      Objective   OBJECTIVE:    Vital Sign Min/Max for last 24 hours  Temp  Min: 98.6 °F (37 °C)  Max: 98.9 °F (37.2 °C)   BP  Min: 129/65  Max: 161/72   Pulse  Min: 77  Max: 99   Resp  Min: 15  Max: 17   SpO2  Min: 90 %  Max: 99 %   No data recorded   No data recorded                         Body mass index is 24.57 kg/m².    Intake/Output Summary (Last 24 hours) at 2020 1117  Last data filed at 2020 0554  Gross per 24 hour   Intake 200 ml   Output 825 ml   Net -625 ml         Exam:  GEN:  No distress, appears stated age  EYES:   PERRL, anicteric sclera  ENT:    External ears/nose normal, OP clear  NECK:  No adenopathy, midline trachea  LUNGS: Normal chest on inspection, palpation and auscultation  CV:  Normal S1S2, without murmur  ABD:  Non tender, non distended, no hepatosplenomegaly, +BS  EXT:  No edema, cyanosis or clubbing    Assessment     Scheduled meds:  allopurinol, 100 mg, Oral, Daily  amLODIPine, 10 mg, Oral, Q24H  hydrALAZINE, 50 mg, Oral, Q8H  metoprolol succinate XL, 50 mg, Oral, Daily  pantoprazole, 40 mg, Oral, Daily      IV meds:                      lactated ringers, 9 mL/hr, Last Rate: 9 mL/hr (20 1020)  niCARdipine, 5-15  mg/hr, Last Rate: Stopped (11/20/20 0900)  sodium chloride 0.45 % with KCl 20 mEq, 75 mL/hr, Last Rate: 75 mL/hr (11/19/20 2223)      Data Review:  Results from last 7 days   Lab Units 11/20/20  0644   SODIUM mmol/L 134*   POTASSIUM mmol/L 4.4   CHLORIDE mmol/L 102   CO2 mmol/L 21.9*   BUN mg/dL 32*   CREATININE mg/dL 1.61*   GLUCOSE mg/dL 134*   CALCIUM mg/dL 8.7         Estimated Creatinine Clearance: 25 mL/min (A) (by C-G formula based on SCr of 1.61 mg/dL (H)).  Results from last 7 days   Lab Units 11/20/20  0644   WBC 10*3/mm3 11.37*   HEMOGLOBIN g/dL 11.0*   PLATELETS 10*3/mm3 247     Results from last 7 days   Lab Units 11/20/20  0644   INR  1.19*                       CT head 11/20 reviewed      Microbiology reviewed  )        Active Hospital Problems    Diagnosis  POA   • **NPH (normal pressure hydrocephalus) (CMS/[facility]) [G91.2]  Yes      Resolved Hospital Problems   No resolved problems to display.         ASSESSMENT:  NPH: Status post  shunt 11/19/2020  Hypertension      PLAN:    Okay to move out of intensive care from my standpoint.  CCP is working on pre-CERT for rehab placement.  We will continue to follow while in the ICU.      Marcelo Clinton MD  Pulmonary and Critical Care Medicine  [facility], [facility]  11/21/2020    11:17 EST

## 2020-11-22 ENCOUNTER — TELEPHONE (OUTPATIENT)
Dept: NEUROSURGERY | Facility: CLINIC | Age: 75
End: 2020-11-22

## 2020-11-22 VITALS
HEART RATE: 74 BPM | OXYGEN SATURATION: 93 % | RESPIRATION RATE: 18 BRPM | TEMPERATURE: 97.5 F | DIASTOLIC BLOOD PRESSURE: 83 MMHG | SYSTOLIC BLOOD PRESSURE: 138 MMHG | WEIGHT: 121.32 LBS | HEIGHT: 58 IN | BODY MASS INDEX: 25.47 KG/M2

## 2020-11-22 LAB
ANION GAP SERPL CALCULATED.3IONS-SCNC: 8.6 MMOL/L (ref 5–15)
BUN SERPL-MCNC: 22 MG/DL (ref 8–23)
BUN/CREAT SERPL: 13.8 (ref 7–25)
CALCIUM SPEC-SCNC: 9 MG/DL (ref 8.6–10.5)
CHLORIDE SERPL-SCNC: 97 MMOL/L (ref 98–107)
CO2 SERPL-SCNC: 25.4 MMOL/L (ref 22–29)
CREAT SERPL-MCNC: 1.59 MG/DL (ref 0.57–1)
DEPRECATED RDW RBC AUTO: 42.3 FL (ref 37–54)
ERYTHROCYTE [DISTWIDTH] IN BLOOD BY AUTOMATED COUNT: 13.7 % (ref 12.3–15.4)
GFR SERPL CREATININE-BSD FRML MDRD: 32 ML/MIN/1.73
GLUCOSE SERPL-MCNC: 86 MG/DL (ref 65–99)
HCT VFR BLD AUTO: 35.6 % (ref 34–46.6)
HGB BLD-MCNC: 12 G/DL (ref 12–15.9)
MCH RBC QN AUTO: 28.4 PG (ref 26.6–33)
MCHC RBC AUTO-ENTMCNC: 33.7 G/DL (ref 31.5–35.7)
MCV RBC AUTO: 84.4 FL (ref 79–97)
PLATELET # BLD AUTO: 221 10*3/MM3 (ref 140–450)
PMV BLD AUTO: 10 FL (ref 6–12)
POTASSIUM SERPL-SCNC: 3.6 MMOL/L (ref 3.5–5.2)
RBC # BLD AUTO: 4.22 10*6/MM3 (ref 3.77–5.28)
SODIUM SERPL-SCNC: 131 MMOL/L (ref 136–145)
WBC # BLD AUTO: 15.59 10*3/MM3 (ref 3.4–10.8)

## 2020-11-22 PROCEDURE — 80048 BASIC METABOLIC PNL TOTAL CA: CPT | Performed by: INTERNAL MEDICINE

## 2020-11-22 PROCEDURE — 97530 THERAPEUTIC ACTIVITIES: CPT

## 2020-11-22 PROCEDURE — 94799 UNLISTED PULMONARY SVC/PX: CPT

## 2020-11-22 PROCEDURE — 85027 COMPLETE CBC AUTOMATED: CPT | Performed by: INTERNAL MEDICINE

## 2020-11-22 PROCEDURE — 97116 GAIT TRAINING THERAPY: CPT

## 2020-11-22 PROCEDURE — 94640 AIRWAY INHALATION TREATMENT: CPT

## 2020-11-22 RX ORDER — BUDESONIDE AND FORMOTEROL FUMARATE DIHYDRATE 160; 4.5 UG/1; UG/1
2 AEROSOL RESPIRATORY (INHALATION)
Status: DISCONTINUED | OUTPATIENT
Start: 2020-11-22 | End: 2020-11-22 | Stop reason: HOSPADM

## 2020-11-22 RX ORDER — ACETAMINOPHEN 325 MG/1
650 TABLET ORAL EVERY 4 HOURS PRN
Start: 2020-11-22

## 2020-11-22 RX ORDER — CALCIUM CARBONATE 200(500)MG
2 TABLET,CHEWABLE ORAL 3 TIMES DAILY PRN
Start: 2020-11-22 | End: 2021-05-14

## 2020-11-22 RX ORDER — POLYETHYLENE GLYCOL 3350 17 G/17G
17 POWDER, FOR SOLUTION ORAL DAILY
Start: 2020-11-23 | End: 2021-01-08

## 2020-11-22 RX ORDER — BUDESONIDE AND FORMOTEROL FUMARATE DIHYDRATE 160; 4.5 UG/1; UG/1
2 AEROSOL RESPIRATORY (INHALATION)
Refills: 12
Start: 2020-11-22 | End: 2021-03-05

## 2020-11-22 RX ADMIN — HYDRALAZINE HYDROCHLORIDE 50 MG: 50 TABLET, FILM COATED ORAL at 14:29

## 2020-11-22 RX ADMIN — HYDRALAZINE HYDROCHLORIDE 50 MG: 50 TABLET, FILM COATED ORAL at 06:46

## 2020-11-22 RX ADMIN — BUDESONIDE AND FORMOTEROL FUMARATE DIHYDRATE 2 PUFF: 160; 4.5 AEROSOL RESPIRATORY (INHALATION) at 12:22

## 2020-11-22 RX ADMIN — METOPROLOL SUCCINATE 50 MG: 50 TABLET, EXTENDED RELEASE ORAL at 08:35

## 2020-11-22 RX ADMIN — ALLOPURINOL 100 MG: 100 TABLET ORAL at 08:35

## 2020-11-22 RX ADMIN — AMLODIPINE BESYLATE 10 MG: 10 TABLET ORAL at 08:35

## 2020-11-22 RX ADMIN — POLYETHYLENE GLYCOL 3350 17 G: 17 POWDER, FOR SOLUTION ORAL at 08:35

## 2020-11-22 RX ADMIN — PANTOPRAZOLE SODIUM 40 MG: 40 TABLET, DELAYED RELEASE ORAL at 08:35

## 2020-11-22 NOTE — PLAN OF CARE
Goal Outcome Evaluation:  Plan of Care Reviewed With: patient  Progress: no change  Outcome Summary: A/O, slept most of the night, no complaints of pain, SBP WNL, will continue to monitor.

## 2020-11-22 NOTE — TELEPHONE ENCOUNTER
Patient discharged to [facility] Sunday, November 22.  Please contact patient to arrange 2-week postop evaluation with Dr. Silverio.  Dr. Silverio needs to see the patient to determine whether or not shunt setting needs to be changed.  Please call the patient's daughter and give her the appointment as she will be the 1 to bring her.

## 2020-11-22 NOTE — PLAN OF CARE
Problem: Adult Inpatient Plan of Care  Goal: Plan of Care Review  Recent Flowsheet Documentation  Taken 11/22/2020 4734 by Katya Bryant PTA  Progress: improving  Plan of Care Reviewed With: patient  Outcome Summary: Pt tolerated treatment with no complaints. Pt is CGA with bed mobility and with sit<->stand transfers. pt ambulated 60ft with rwx, CGA. Pt required cues to correct posture. Pt did require a couple of standing rest breaks due to fatigue. Will continue to progress pt as tolerated.   ..Patient was wearing a face mask during this therapy encounter. Therapist used appropriate personal protective equipment including eye protection, mask, and gloves.  Mask used was standard procedure mask. Appropriate PPE was worn during the entire therapy session. Hand hygiene was completed before and after therapy session. Patient is not in enhanced droplet precautions.

## 2020-11-22 NOTE — PROGRESS NOTES
[facility] INPATIENT PROGRESS NOTE         [address]    2020      PATIENT IDENTIFICATION:  Name: Mary Boyle ADMIT: 2020   : 1945  PCP: Babar Jones MD    MRN: [id number] LOS: 3 days   AGE/SEX: 75 y.o. female  ROOM: [facility]                     LOS 3    Reason for visit: Follow-up ICU medical management status post  shunt 2020      SUBJECTIVE:      Complains of feeling more wheezy this morning.  Diminished breath sounds on auscultation with mild expiratory wheeze.  She uses Ventolin fairly frequently at home.  Does not follow with pulmonary as an outpatient.  She would likely benefit from maintenance therapy or preventive symptoms with moderate persistent asthma based on history and will add Symbicort to short acting beta agonist use.  Doing well out of the intensive care unit.      Objective   OBJECTIVE:    Vital Sign Min/Max for last 24 hours  Temp  Min: 97.8 °F (36.6 °C)  Max: 98 °F (36.7 °C)   BP  Min: 146/69  Max: 167/85   Pulse  Min: 79  Max: 84   Resp  Min: 16  Max: 18   SpO2  Min: 93 %  Max: 98 %   No data recorded   Weight  Min: 55 kg (121 lb 5.1 oz)  Max: 55 kg (121 lb 5.1 oz)                         Body mass index is 25.36 kg/m².    Intake/Output Summary (Last 24 hours) at 2020 1131  Last data filed at 2020 0541  Gross per 24 hour   Intake 240 ml   Output 400 ml   Net -160 ml         Exam:  GEN:  No distress, appears stated age  EYES:   PERRL, anicteric sclera  ENT:    External ears/nose normal, OP clear  NECK:  No adenopathy, midline trachea  LUNGS: Normal chest on inspection, palpation and mild wheeze on auscultation  CV:  Normal S1S2, without murmur  ABD:  Non tender, non distended, no hepatosplenomegaly, +BS  EXT:  No edema, cyanosis or clubbing    Assessment     Scheduled meds:  allopurinol, 100 mg, Oral, Daily  amLODIPine, 10 mg, Oral, Q24H  budesonide-formoterol, 2 puff, Inhalation, BID - RT  hydrALAZINE, 50 mg, Oral,  Q8H  metoprolol succinate XL, 50 mg, Oral, Daily  pantoprazole, 40 mg, Oral, Daily  polyethylene glycol, 17 g, Oral, Daily      IV meds:                      lactated ringers, 9 mL/hr, Last Rate: 9 mL/hr (11/19/20 1020)  niCARdipine, 5-15 mg/hr, Last Rate: Stopped (11/20/20 0900)      Data Review:  Results from last 7 days   Lab Units 11/22/20  0735 11/20/20  0644   SODIUM mmol/L 131* 134*   POTASSIUM mmol/L 3.6 4.4   CHLORIDE mmol/L 97* 102   CO2 mmol/L 25.4 21.9*   BUN mg/dL 22 32*   CREATININE mg/dL 1.59* 1.61*   GLUCOSE mg/dL 86 134*   CALCIUM mg/dL 9.0 8.7         Estimated Creatinine Clearance: 26.1 mL/min (A) (by C-G formula based on SCr of 1.59 mg/dL (H)).  Results from last 7 days   Lab Units 11/22/20  0735 11/20/20  0644   WBC 10*3/mm3 15.59* 11.37*   HEMOGLOBIN g/dL 12.0 11.0*   PLATELETS 10*3/mm3 221 247     Results from last 7 days   Lab Units 11/20/20  0644   INR  1.19*                       CT head 11/20 reviewed      Microbiology reviewed  )        Active Hospital Problems    Diagnosis  POA   • **NPH (normal pressure hydrocephalus) (CMS/HCC) [G91.2]  Yes      Resolved Hospital Problems   No resolved problems to display.         ASSESSMENT:  NPH: Status post  shunt 11/19/2020  Hypertension  COPD  Hyponatremia      PLAN:    Doing well out of the intensive care unit.  Increased wheezing symptoms with bronchospasm.  Add maintenance inhaled medications with Symbicort.      Marcelo Clinton MD  Pulmonary and Critical Care Medicine  [facility], [facility]  11/22/2020    11:31 EST

## 2020-11-22 NOTE — DISCHARGE SUMMARY
Mary Boyle  1945    Patient Care Team:  Babar Jones MD as PCP - General  Babar Jones MD as PCP - Family Medicine    Date of Admit: 11/19/2020    Date of Discharge:  11/22/2020    Discharge Diagnosis:  NPH (normal pressure hydrocephalus) (CMS/HCC)  COPD  Hypertension    Procedures Performed  Procedure(s):  Right sided VENTRICULAR PERITONEAL SHUNT INSERTION STERIOTACTIC  LAPAROSCOPIC ASSISTED VENTRICULAR PERITONEAL SHUNT PLACEMENT     Complications: None    Consultants:   Consults     Date and Time Order Name Status Description    11/19/2020 1557 Inpatient Intensivist Consult Completed     11/12/2020 1656 Inpatient General Surgery Consult Completed     11/12/2020 1237 Inpatient Neurosurgery Consult Completed     11/9/2020 1928 Inpatient Neurology Consult General Completed     11/9/2020 1536 LIPPS (on-call MD unless specified) Completed           Condition on Discharge: stable    Discharge disposition: [facility]    Pertinent Test Results: CT HEAD 11/20/20 STABLE                                        SHUNT SERIES XR 11/19/20 STABLE        Brief HPI: This is a 75-year-old very pleasant female with a several month history of walking difficulty, forgetfulness, urinary incontinence and shuffling gait.  Brain imaging revealed ventriculomegaly and we were asked to evaluate for normal pressure hydrocephalus.  She underwent work-up with large volume tap via lumbar puncture. There was significant improvement in her balance and gait afterwards.  She was therefore deemed a good candidate for ventriculoperitoneal shunt placement and she was taken to the operating room for the above-stated elective surgical procedure.  The patient underwent right parietal approach ventricular O peritoneal shunt placement using Stealth navigation.  Dr. Tad Arriaga assisted with laparoscopic placement of the shunt catheter. Please see admission H&P for further details.      Hospital Course: The patient has a strata  Medtronic valve programmed at 2.0.  She was observed for a couple days in the intensive care unit and deemed appropriate for [facility] floor day or 2 ago.  She is ambulating and doing quite well with physical therapy.  Her memory is a bit sharper and that she can recall the name of the president, where she is much more readily.  She states that she is continent of urine.  Her daughter is at bedside and also notices a significant improvement in her cognition and walking ability.  She was evaluated postoperatively by Dr. Downs and cleared for discharge anytime on November 21.  She was followed by the intensivist and due to her history of COPD she was started on a Symbicort inhaler which will be continued at discharge.  The right posterior parietal incision is well approximated without redness, swelling, or drainage.  There is surgical glue intact to the site. The shunt reservoir pumps and refills well.  The right abdomen incisions are also well approximated with Steri-Strips intact.  No calf swelling or redness noted to palpation bilaterally.  The patient is afebrile and her vital signs are stable.  She will be discharged to subacute rehab at [facility].  Her daughter is present to drive her there.  Both the patient, her daughter, and Dr. Rodriguez who is covering for Dr. Silverio are in agreement with this plan.      Temp:  [97.5 °F (36.4 °C)-98 °F (36.7 °C)] 97.5 °F (36.4 °C)  Heart Rate:  [69-84] 74  Resp:  [18] 18  BP: (138-167)/(66-85) 138/83      Discharge Medications  Diogenes has been reviewed and narcotic consent is on file in the patient's chart.     Your medication list      START taking these medications      Instructions Last Dose Given Next Dose Due   budesonide-formoterol 160-4.5 MCG/ACT inhaler  Commonly known as: SYMBICORT      Inhale 2 puffs 2 (Two) Times a Day.       calcium carbonate 500 MG chewable tablet  Commonly known as: TUMS      Chew 1,000 mg 3 (Three) Times a Day As Needed for  Indigestion or Heartburn.       polyethylene glycol 17 g packet  Commonly known as: MIRALAX  Start taking on: November 23, 2020      Take 17 g by mouth Daily.          CHANGE how you take these medications      Instructions Last Dose Given Next Dose Due   acetaminophen 325 MG tablet  Commonly known as: TYLENOL  What changed: Another medication with the same name was added. Make sure you understand how and when to take each.      Take 650 mg by mouth Every 6 (Six) Hours As Needed for Mild Pain .       acetaminophen 325 MG tablet  Commonly known as: TYLENOL  What changed: You were already taking a medication with the same name, and this prescription was added. Make sure you understand how and when to take each.      Take 2 tablets by mouth Every 4 (Four) Hours As Needed for Mild Pain .          CONTINUE taking these medications      Instructions Last Dose Given Next Dose Due   allopurinol 100 MG tablet  Commonly known as: ZYLOPRIM      Take 1 tablet by mouth Daily.       amLODIPine 10 MG tablet  Commonly known as: NORVASC      Take 1 tablet by mouth Daily for 30 days.       hydrALAZINE 50 MG tablet  Commonly known as: APRESOLINE      Take 1 tablet by mouth Every 8 (Eight) Hours for 30 days.       ipratropium-albuterol 0.5-2.5 mg/3 ml nebulizer  Commonly known as: DUO-NEB      2 sprays into each nostril daily       metoprolol succinate XL 25 MG 24 hr tablet  Commonly known as: TOPROL-XL      50 mg Daily.       pantoprazole 40 MG EC tablet  Commonly known as: PROTONIX      Take 1 tablet by mouth Daily for 30 days.       Ventolin  (90 Base) MCG/ACT inhaler  Generic drug: albuterol sulfate HFA      2 puffs Every 4 (Four) Hours As Needed.             Where to Get Your Medications      Information about where to get these medications is not yet available    Ask your nurse or doctor about these medications  · acetaminophen 325 MG tablet  · budesonide-formoterol 160-4.5 MCG/ACT inhaler  · calcium carbonate 500 MG  "chewable tablet  · polyethylene glycol 17 g packet         Discharge Diet:   Diet Instructions     Diet: Regular; Thin      Discharge Diet: Regular    Fluid Consistency: Thin        Diet Order   Procedures   • Diet Regular       Activity at Discharge: Please refer to the postoperative instruction sheet that has been attached in the after visit summary.      Call for: questions or concerns    Follow-up Appointments  Future Appointments   Date Time Provider Department Center   2/23/2021  9:00 AM Hal Hale MD [facility]     Follow-up Information     Babar Jones MD .    Specialty: Family Medicine  Contact information:  [address] [id number] [phone]                 Additional Instructions for the Follow-ups that You Need to Schedule     Discharge Follow-up with Specialty: Neurosurgery - Dr. Silverio's office; 2 Weeks   As directed      Specialty: Neurosurgery - Dr. Silverio's office    Follow Up: 2 Weeks    Follow Up Details: Office will contact you re follow up. If you don't hear from them by Tuesday morning, please call and make appointment.               Test Results Pending at Discharge     None    I discussed the discharge instructions with patient and family    Chelsi Abrams, APRN  11/22/20  15:45 EST    \"Dictated utilizing Dragon dictation\".    "

## 2020-11-22 NOTE — PLAN OF CARE
Goal Outcome Evaluation:  Plan of Care Reviewed With: patient, daughter  Progress: improving  Outcome Summary: no neuro changes noted. BP 150s-160s before AM meds, then down to 130s-140s. ambulated w/ PT. asst x1. d/c to rehab tomorrow.

## 2020-11-22 NOTE — THERAPY TREATMENT NOTE
Patient Name: Mary Boyle  : 1945    MRN: [id number]                              Today's Date: 2020       Admit Date: 2020    Visit Dx: No diagnosis found.  Patient Active Problem List   Diagnosis   • Chest pain   • Colon polyp   • COPD (chronic obstructive pulmonary disease) (CMS/HCC)   • Diverticulosis   • Essential hypertension   • GERD without esophagitis   • Hypercholesterolemia   • Osteopenia   • Multiple thyroid nodules   • Chronic fatigue   • Prediabetes   • Vitamin D deficiency   • Renal insufficiency   • Leg swelling   • Hyperuricemia   • SOB (shortness of breath)   • Generalized weakness   • NPH (normal pressure hydrocephalus) (CMS/HCC)     Past Medical History:   Diagnosis Date   • Arthritis    • Asthma    • Chest pain    • Depression    • Dizziness 2020    Went to ER with dizziness and generalized weakness--found to have hydrocephalus.    • Ganglion cyst     right leg   • GERD (gastroesophageal reflux disease)    • Gout    • Hiatal hernia    • Hypertension    • Hypokalemia    • Mild mitral regurgitation    • Normal pressure hydrocephalus (CMS/HCC)    • Personality disorder (CMS/HCC)     PER NOTE IN EPIC    • PONV (postoperative nausea and vomiting)    • Recovering alcoholic (CMS/HCC)     SOBER SINCE    • Thyroid nodule    • Tricuspid regurgitation    • Vitamin D deficiency      Past Surgical History:   Procedure Laterality Date   • APPENDECTOMY     • BREAST LUMPECTOMY     • CHOLECYSTECTOMY     • GANGLION CYST EXCISION     • HERNIA REPAIR     • HYSTERECTOMY     •  SHUNT INSERTION N/A 2020    Procedure: Right sided VENTRICULAR PERITONEAL SHUNT INSERTION STERIOTACTIC;  Surgeon: Xavi Silverio MD;  Location: [facility];  Service: Neurosurgery;  Laterality: N/A;     General Information     Row Name 20 1419          Physical Therapy Time and Intention    Document Type  therapy note (daily note)  -     Mode of Treatment  physical therapy;individual  therapy  -     Row Name 11/22/20 1419          General Information    Existing Precautions/Restrictions  fall  -     Row Name 11/22/20 1419          Cognition    Orientation Status (Cognition)  oriented x 3  -     Row Name 11/22/20 1419          Safety Issues, Functional Mobility    Safety Issues Affecting Function (Mobility)  awareness of need for assistance  -     Impairments Affecting Function (Mobility)  balance;endurance/activity tolerance;strength  -       User Key  (r) = Recorded By, (t) = Taken By, (c) = Cosigned By    Initials Name Provider Type     Katya Bryant PTA Physical Therapy Assistant        Mobility     Row Name 11/22/20 1419          Bed Mobility    Supine-Sit Adah (Bed Mobility)  contact guard;verbal cues  -     Sit-Supine Adah (Bed Mobility)  contact guard;verbal cues  -     Assistive Device (Bed Mobility)  bed rails;head of bed elevated  -     Row Name 11/22/20 1419          Sit-Stand Transfer    Sit-Stand Adah (Transfers)  contact guard  -     Assistive Device (Sit-Stand Transfers)  walker, front-wheeled  -     Row Name 11/22/20 1419          Gait/Stairs (Locomotion)    Adah Level (Gait)  contact guard  -     Assistive Device (Gait)  walker, front-wheeled  -     Distance in Feet (Gait)  60  -     Deviations/Abnormal Patterns (Gait)  antalgic;festinating/shuffling;jamari decreased;stride length decreased  -     Bilateral Gait Deviations  heel strike decreased;forward flexed posture  -     Comment (Gait/Stairs)  cues to correct posture.  -       User Key  (r) = Recorded By, (t) = Taken By, (c) = Cosigned By    Initials Name Provider Type     Katya Bryant PTA Physical Therapy Assistant        Obj/Interventions    No documentation.       Goals/Plan    No documentation.       Clinical Impression     Row Name 11/22/20 7689          Plan of Care Review    Plan of Care Reviewed With  patient  -     Progress  improving  -      Outcome Summary  Pt tolerated treatment with no complaints. Pt is CGA with bed mobility and with sit<->stand transfers. pt ambulated 60ft with rwx, CGA. Pt required cues to correct posture. Pt did require a couple of standing rest breaks due to fatigue. Will continue to progress pt as tolerated.  -     Row Name 11/22/20 1453          Positioning and Restraints    Pre-Treatment Position  in bed  -EH     Post Treatment Position  bed  -EH     In Bed  supine;call light within reach;encouraged to call for assist;exit alarm on;notified nsg  -       User Key  (r) = Recorded By, (t) = Taken By, (c) = Cosigned By    Initials Name Provider Type     Katya Bryant PTA Physical Therapy Assistant        Outcome Measures     Row Name 11/22/20 1420          How much help from another person do you currently need...    Turning from your back to your side while in flat bed without using bedrails?  3  -EH     Moving from lying on back to sitting on the side of a flat bed without bedrails?  3  -EH     Moving to and from a bed to a chair (including a wheelchair)?  3  -EH     Standing up from a chair using your arms (e.g., wheelchair, bedside chair)?  3  -EH     Climbing 3-5 steps with a railing?  2  -EH     To walk in hospital room?  3  -EH     AM-PAC 6 Clicks Score (PT)  17  -     Row Name 11/22/20 1420          Modified [place] Scale    Modified [place] Scale  4 - Moderately severe disability.  Unable to walk without assistance, and unable to attend to own bodily needs without assistance.  -       User Key  (r) = Recorded By, (t) = Taken By, (c) = Cosigned By    Initials Name Provider Type     Katya Bryant PTA Physical Therapy Assistant        Physical Therapy Education                 Title: PT OT SLP Therapies (Done)     Topic: Physical Therapy (Done)     Point: Mobility training (Done)     Learning Progress Summary           Patient Acceptance, E,D, NR,VU by  at 11/22/2020 1420    Acceptance, E, NR by AR at  11/20/2020 1356                   Point: Home exercise program (Done)     Learning Progress Summary           Patient Acceptance, E,D, NR,VU by  at 11/22/2020 1420    Acceptance, E, NR by AR at 11/20/2020 1356                   Point: Body mechanics (Done)     Learning Progress Summary           Patient Acceptance, E,D, NR,VU by  at 11/22/2020 1420    Acceptance, E, NR by AR at 11/20/2020 1356                   Point: Precautions (Done)     Learning Progress Summary           Patient Acceptance, E,D, NR,VU by  at 11/22/2020 1420    Acceptance, E, NR by AR at 11/20/2020 1356                               User Key     Initials Effective Dates Name Provider Type Discipline    AR 04/03/18 -  Sima Pope PT Physical Therapist PT     08/19/18 -  Katya Bryant PTA Physical Therapy Assistant PT              PT Recommendation and Plan     Plan of Care Reviewed With: patient  Progress: improving  Outcome Summary: Pt tolerated treatment with no complaints. Pt is CGA with bed mobility and with sit<->stand transfers. pt ambulated 60ft with rwx, CGA. Pt required cues to correct posture. Pt did require a couple of standing rest breaks due to fatigue. Will continue to progress pt as tolerated.     Time Calculation:   PT Charges     Row Name 11/22/20 1417             Time Calculation    Start Time  1331  -      Stop Time  1354  -      Time Calculation (min)  23 min  -      PT Received On  11/22/20  -      PT - Next Appointment  11/23/20  -         Time Calculation- PT    Total Timed Code Minutes- PT  23 minute(s)  -        User Key  (r) = Recorded By, (t) = Taken By, (c) = Cosigned By    Initials Name Provider Type     Katya Bryant PTA Physical Therapy Assistant        Therapy Charges for Today     Code Description Service Date Service Provider Modifiers Qty    68706949235 HC PT THERAPEUTIC ACT EA 15 MIN 11/22/2020 Katya Bryant PTA GP 1    [id number] HC GAIT TRAINING EA 15 MIN 11/22/2020 Manny  Katya, PTA GP 1          PT G-Codes  Outcome Measure Options: AM-PAC 6 Clicks Basic Mobility (PT)  AM-PAC 6 Clicks Score (PT): 17  Modified [place] Scale: 4 - Moderately severe disability.  Unable to walk without assistance, and unable to attend to own bodily needs without assistance.    Katya Bryant, PTA  11/22/2020

## 2020-11-22 NOTE — PROGRESS NOTES
Continued Stay Note  [facility]     Patient Name: Mary Boyle  MRN: [id number]  Today's Date: 11/22/2020    Admit Date: 11/19/2020    Discharge Plan     Row Name 11/22/20 1026       Plan    [facility]    Plan Comments  Inbound call from Bri[facility]. Pre-cert has been obtained and patient can admit today if stable for DC. Message sent to Neurosurgery PA to inform of pre-cert. Zuleika Pinto RN        Discharge Codes    No documentation.             Zuleika Pinto RN

## 2020-11-22 NOTE — PROGRESS NOTES
"Chief Complaint:    POD 2, S/P placement of ventriculoperitoneal shunt for normal pressure hydrocephalus    Subjective:    Feels fine.  No headache.  Tolerating regular diet.  Wants to have a bowel movement.    Objective:    Vitals:    11/21/20 1200 11/21/20 1252 11/21/20 1500 11/21/20 1506   BP: 146/69 152/74     BP Location:  Right arm     Patient Position:  Lying     Pulse: 81  80 79   Resp:  16 16 18   Temp:  97.8 °F (36.6 °C)     TempSrc:  Oral     SpO2: 93%  94% 98%   Weight:  55 kg (121 lb 5.1 oz)     Height:  147.3 cm (57.99\")         Lungs: Clear  Heart: Regular  Abdomen: Incisions look okay. BS present.   Extremities: Warm      Assessment:    POD 2, S/P placement of ventriculoperitoneal shunt for normal pressure hydrocephalus    Plan:    Continue regular diet.    I will add MiraLAX.  From my standpoint, she may go home when she is cleared by neurosurgery for discharge.  "

## 2020-11-23 NOTE — PROGRESS NOTES
Case Management Discharge Note      Final Note: DC'd to skilled bed at [facility] 11/22               Selected Continued Care - Prior Encounters Includes selections from prior encounters from 8/21/2020 to 11/22/2020    Discharged on 11/14/2020 Admission date: 11/9/2020 - Discharge disposition: Skilled Nursing Facility (DC - External)    Destination     Service Provider Selected Services Address Phone Fax Patient Preferred    [facility]  Skilled Nursing [address] [address] [phone] --                    Transportation Services  Private: Car    Final Discharge Disposition Code: 03 - skilled nursing facility (SNF)

## 2020-12-03 NOTE — PROGRESS NOTES
Subjective   History of Present Illness: Mary Boyle is a 75 y.o. female is here today a P/O 1 R sided ventricular peritoneal shunt on 11/19/20 for NPH.  Today she reports improvement in her memory, speech, ability to walk.  She was recently discharged from rehab and is living home by herself.  She has in-home rehab starting this week.  She denies any headaches.  Denies any fevers.  Denies any drainage from her incision.  Denies any abdominal pain.  Denies any visual changes.       History of Present Illness    The following portions of the patient's history were reviewed and updated as appropriate: allergies, past family history, past medical history, past social history, past surgical history and problem list.    Past Medical History:   Diagnosis Date   • Arthritis    • Asthma    • Chest pain    • Depression    • Dizziness 11/11/2020    Went to ER with dizziness and generalized weakness--found to have hydrocephalus.    • Ganglion cyst     right leg   • GERD (gastroesophageal reflux disease)    • Gout    • Hiatal hernia    • Hypertension    • Hypokalemia    • Mild mitral regurgitation    • Normal pressure hydrocephalus (CMS/HCC)    • Personality disorder (CMS/HCC)     PER NOTE IN EPIC    • PONV (postoperative nausea and vomiting)    • Recovering alcoholic (CMS/HCC)     SOBER SINCE 2003   • Thyroid nodule    • Tricuspid regurgitation    • Vitamin D deficiency         Past Surgical History:   Procedure Laterality Date   • APPENDECTOMY     • BREAST LUMPECTOMY     • CHOLECYSTECTOMY     • GANGLION CYST EXCISION     • HERNIA REPAIR     • HYSTERECTOMY     •  SHUNT INSERTION N/A 11/19/2020    Procedure: Right sided VENTRICULAR PERITONEAL SHUNT INSERTION STERIOTACTIC;  Surgeon: Xavi Silverio MD;  Location: [facility];  Service: Neurosurgery;  Laterality: N/A;          Current Outpatient Medications:   •  acetaminophen (TYLENOL) 325 MG tablet, Take 650 mg by mouth Every 6 (Six) Hours As Needed for Mild Pain  "., Disp: , Rfl:   •  albuterol (VENTOLIN HFA) 108 (90 BASE) MCG/ACT inhaler, 2 puffs Every 4 (Four) Hours As Needed., Disp: , Rfl:   •  allopurinol (ZYLOPRIM) 100 MG tablet, Take 1 tablet by mouth Daily., Disp: 90 tablet, Rfl: 3  •  amLODIPine (NORVASC) 10 MG tablet, Take 1 tablet by mouth Daily for 30 days., Disp: 30 tablet, Rfl: 0  •  budesonide-formoterol (SYMBICORT) 160-4.5 MCG/ACT inhaler, Inhale 2 puffs 2 (Two) Times a Day., Disp: , Rfl: 12  •  calcium carbonate (TUMS) 500 MG chewable tablet, Chew 1,000 mg 3 (Three) Times a Day As Needed for Indigestion or Heartburn., Disp:  , Rfl:   •  hydrALAZINE (APRESOLINE) 50 MG tablet, Take 1 tablet by mouth Every 8 (Eight) Hours for 30 days., Disp: 90 tablet, Rfl: 0  •  ipratropium-albuterol (DUO-NEB) 0.5-2.5 mg/mL nebulizer, 2 sprays into each nostril daily, Disp: 360 mL, Rfl: 0  •  metoprolol succinate XL (TOPROL-XL) 25 MG 24 hr tablet, 50 mg Daily., Disp: , Rfl:   •  pantoprazole (PROTONIX) 40 MG EC tablet, Take 1 tablet by mouth Daily for 30 days., Disp: 30 tablet, Rfl: 0  •  polyethylene glycol (polyethylene glycol) 17 g packet, Take 17 g by mouth Daily., Disp:  , Rfl:   •  acetaminophen (TYLENOL) 325 MG tablet, Take 2 tablets by mouth Every 4 (Four) Hours As Needed for Mild Pain ., Disp:  , Rfl:      Social History     Socioeconomic History   • Marital status:      Spouse name: Not on file   • Number of children: Not on file   • Years of education: Not on file   • Highest education level: Not on file   Occupational History   • Occupation: retired   Tobacco Use   • Smoking status: Former Smoker     Packs/day: 2.00     Years: 20.00     Pack years: 40.00     Types: Cigarettes     Quit date: 2005     Years since quitting: 15.9   • Smokeless tobacco: Never Used   Substance and Sexual Activity   • Alcohol use: No     Comment: \"15 years sobriety\"   • Drug use: No   • Sexual activity: Never     Birth control/protection: Surgical        Family History   Problem " "Relation Age of Onset   • Heart attack Other    • Heart disease Other    • Hypertension Father    • Stroke Father    • Malig Hyperthermia Neg Hx         Review of Systems   HENT: Negative for tinnitus.    Eyes: Negative for visual disturbance.   Respiratory: Negative for cough.    Cardiovascular: Negative for chest pain.   Gastrointestinal: Negative for abdominal pain.   Musculoskeletal: Positive for gait problem (weakness).   Neurological: Negative for dizziness, seizures, syncope, speech difficulty, light-headedness, numbness and headaches.   Psychiatric/Behavioral: Negative for confusion and decreased concentration.       Objective     Vitals:    20 1004   BP: 160/80   Pulse: 80   Resp: 20   Temp: 97.5 °F (36.4 °C)   Height: 147.3 cm (58\")     Body mass index is 25.36 kg/m².      Physical Exam  Neurologic Exam  Awake, alert, oriented x3  Pupils equal round reactive to light  Extraocular muscles intact  Face symmetric  Speech is fluent and clear  No pronator drift  Motor exam  Bilateral deltoids 5/5, bilateral biceps 5/5, bilateral triceps 5/5, bilateral wrist extension 5/5 bilateral hand  5/5  Bilateral hip flexion 5/5, bilateral knee extension 5/5, bilateral DF/PF 5/5  No clonus  No Augusta's reflex  2+ bilateral patellar reflexes  2+ bilateral biceps reflex  Steady normal gait  Able to walk heel-to-toe without difficulty  Able to detect  light touch in all 4 extremities  Shunt valve depresses and refills easily      Assessment/Plan   Independent Review of Radiographic Studies:      I personally reviewed the images from the following studies.  No imaging studies    Medical Decision Makin-year-old female status post ventriculoperitoneal shunt placement on 2020 for NPH  -She was recently discharged from inpatient rehab and is now living at home by herself.  She is here with her daughter and they both report that her memory has improved, she is walking much better, but still has some " urinary incontinence  -We will plan to have her follow-up in 4 weeks    Diagnoses and all orders for this visit:    1. NPH (normal pressure hydrocephalus) (CMS/HCC) (Primary)      Return in about 4 weeks (around 1/4/2021).         Answers for HPI/ROS submitted by the patient on 12/1/2020   What is the primary reason for your visit?: Other  Please describe your symptoms.: Follow up visit after shunt surgery on 11/19/2020  Have you had these symptoms before?: Yes  How long have you been having these symptoms?: Greater than 2 weeks  Please list any medications you are currently taking for this condition.: None  Please describe any probable cause for these symptoms. : Normal Pressure Hydrocephalus

## 2020-12-07 ENCOUNTER — OFFICE VISIT (OUTPATIENT)
Dept: NEUROSURGERY | Facility: CLINIC | Age: 75
End: 2020-12-07

## 2020-12-07 VITALS
HEART RATE: 80 BPM | HEIGHT: 58 IN | DIASTOLIC BLOOD PRESSURE: 80 MMHG | SYSTOLIC BLOOD PRESSURE: 160 MMHG | RESPIRATION RATE: 20 BRPM | BODY MASS INDEX: 25.36 KG/M2 | TEMPERATURE: 97.5 F

## 2020-12-07 DIAGNOSIS — G91.2 NPH (NORMAL PRESSURE HYDROCEPHALUS) (HCC): Primary | ICD-10-CM

## 2020-12-07 PROCEDURE — 99024 POSTOP FOLLOW-UP VISIT: CPT | Performed by: NEUROLOGICAL SURGERY

## 2021-01-06 NOTE — PROGRESS NOTES
Subjective   History of Present Illness: Mary Boyle is a 75 y.o. female is here today for follow-up for NPH.  Pt is s/p ventriculoperitoneal shunt on 11/19/20.  Here today with her daughter.  They both report that she has improved significantly with her memory and ability to walk.  She has been discharged from physical therapy.  He denies any fevers.  Denies any headaches the incision appears well-healed.  She has no new complaints today        History of Present Illness    The following portions of the patient's history were reviewed and updated as appropriate: allergies, current medications, past medical history, past social history, past surgical history and problem list.    Past Medical History:   Diagnosis Date   • Arthritis    • Asthma    • Chest pain    • Depression    • Dizziness 11/11/2020    Went to ER with dizziness and generalized weakness--found to have hydrocephalus.    • Ganglion cyst     right leg   • GERD (gastroesophageal reflux disease)    • Gout    • Hiatal hernia    • Hypertension    • Hypokalemia    • Mild mitral regurgitation    • Normal pressure hydrocephalus (CMS/HCC)    • Personality disorder (CMS/HCC)     PER NOTE IN EPIC    • PONV (postoperative nausea and vomiting)    • Recovering alcoholic (CMS/HCC)     SOBER SINCE 2003   • Thyroid nodule    • Tricuspid regurgitation    • Vitamin D deficiency         Past Surgical History:   Procedure Laterality Date   • APPENDECTOMY     • BREAST LUMPECTOMY     • CHOLECYSTECTOMY     • GANGLION CYST EXCISION     • HERNIA REPAIR     • HYSTERECTOMY     •  SHUNT INSERTION N/A 11/19/2020    Procedure: Right sided VENTRICULAR PERITONEAL SHUNT INSERTION STERIOTACTIC;  Surgeon: Xavi Silverio MD;  Location: [facility]  Service: Neurosurgery;  Laterality: N/A;          Current Outpatient Medications:   •  acetaminophen (TYLENOL) 325 MG tablet, Take 2 tablets by mouth Every 4 (Four) Hours As Needed for Mild Pain . (Patient taking differently:  "Take 325 mg by mouth Every 4 (Four) Hours As Needed for Mild Pain .), Disp:  , Rfl:   •  albuterol (VENTOLIN HFA) 108 (90 BASE) MCG/ACT inhaler, 2 puffs Every 4 (Four) Hours As Needed., Disp: , Rfl:   •  allopurinol (ZYLOPRIM) 100 MG tablet, Take 1 tablet by mouth Daily., Disp: 90 tablet, Rfl: 3  •  amLODIPine (NORVASC) 10 MG tablet, Take 10 mg by mouth Daily., Disp: , Rfl:   •  budesonide-formoterol (SYMBICORT) 160-4.5 MCG/ACT inhaler, Inhale 2 puffs 2 (Two) Times a Day., Disp: , Rfl: 12  •  calcium carbonate (TUMS) 500 MG chewable tablet, Chew 1,000 mg 3 (Three) Times a Day As Needed for Indigestion or Heartburn., Disp:  , Rfl:   •  furosemide (LASIX) 20 MG tablet, Take 20 mg by mouth Daily., Disp: , Rfl:   •  metoprolol succinate XL (TOPROL-XL) 25 MG 24 hr tablet, 50 mg Daily., Disp: , Rfl:      Social History     Socioeconomic History   • Marital status:      Spouse name: Not on file   • Number of children: Not on file   • Years of education: Not on file   • Highest education level: Not on file   Occupational History   • Occupation: retired   Tobacco Use   • Smoking status: Former Smoker     Packs/day: 2.00     Years: 20.00     Pack years: 40.00     Types: Cigarettes     Quit date:      Years since quittin.0   • Smokeless tobacco: Never Used   Substance and Sexual Activity   • Alcohol use: No     Comment: \"15 years sobriety\"   • Drug use: No   • Sexual activity: Never     Birth control/protection: Surgical        Family History   Problem Relation Age of Onset   • Heart attack Other    • Heart disease Other    • Hypertension Father    • Stroke Father    • Malig Hyperthermia Neg Hx         Review of Systems   Constitutional: Negative for chills and fever.   Respiratory: Negative for cough and shortness of breath.    Genitourinary: Negative for difficulty urinating and frequency.        - bowel incontinence  + urinary incontinence   Musculoskeletal: Negative for gait problem.   Neurological: " "Negative for dizziness, weakness, light-headedness and numbness.   Psychiatric/Behavioral: Negative for confusion and decreased concentration.       Objective     Vitals:    21 0918   BP: 134/66   Pulse: 81   SpO2: 97%   Weight: 54.3 kg (119 lb 12.8 oz)   Height: 147.3 cm (58\")     Body mass index is 25.04 kg/m².      Physical Exam  Neurologic Exam  Awake, alert, oriented x3  Pupils equal round reactive to light  Extraocular muscles intact  Face symmetric  Speech is fluent and clear  No pronator drift  Motor exam  Bilateral deltoids 5/5, bilateral biceps 5/5, bilateral triceps 5/5, bilateral wrist extension 5/5 bilateral hand  5/5  Bilateral hip flexion 5/5, bilateral knee extension 5/5, bilateral DF/PF 5/5  No clonus  No Augusta's reflex  2+ bilateral patellar reflexes  2+ bilateral biceps reflex  Slightly unsteady gait, uses rolling walker for assistance, but able to walk across the office without the rolling walker  Able to detect  light touch in all 4 extremities  Incision is clean dry and intact      Assessment/Plan   Independent Review of Radiographic Studies:      I personally reviewed the images from the following studies.  No new imaging studies today    Medical Decision Makin-year-old female status post  shunt for NPH on 2020  She is here with her daughter today and they report significant improvement in her memory and gait  -She has been discharged from physical therapy.  She is asked me about driving and have recommended a  [facility].  She wants to wait a couple months and talk about this again later.  She is still interested in driving I will recommend that program.   -She appears to be doing well.  I will plan to follow her up in 2 months to evaluate for any changes in her symptoms.  I will plan to leave the shunt set at 2.0 for now.     There are no diagnoses linked to this encounter.  No follow-ups on file.         "

## 2021-01-08 ENCOUNTER — OFFICE VISIT (OUTPATIENT)
Dept: NEUROSURGERY | Facility: CLINIC | Age: 76
End: 2021-01-08

## 2021-01-08 VITALS
SYSTOLIC BLOOD PRESSURE: 134 MMHG | WEIGHT: 119.8 LBS | OXYGEN SATURATION: 97 % | DIASTOLIC BLOOD PRESSURE: 66 MMHG | BODY MASS INDEX: 25.15 KG/M2 | HEART RATE: 81 BPM | HEIGHT: 58 IN

## 2021-01-08 DIAGNOSIS — Z98.2 S/P VP SHUNT: ICD-10-CM

## 2021-01-08 DIAGNOSIS — G91.2 NPH (NORMAL PRESSURE HYDROCEPHALUS) (HCC): Primary | ICD-10-CM

## 2021-01-08 PROCEDURE — 99024 POSTOP FOLLOW-UP VISIT: CPT | Performed by: NEUROLOGICAL SURGERY

## 2021-01-08 RX ORDER — AMLODIPINE BESYLATE 10 MG/1
10 TABLET ORAL DAILY
COMMUNITY
Start: 2020-12-22 | End: 2021-03-05

## 2021-01-08 RX ORDER — FUROSEMIDE 20 MG/1
20 TABLET ORAL DAILY
COMMUNITY
Start: 2020-12-28 | End: 2021-03-10 | Stop reason: ALTCHOICE

## 2021-03-01 ENCOUNTER — TELEPHONE (OUTPATIENT)
Dept: NEUROSURGERY | Facility: CLINIC | Age: 76
End: 2021-03-01

## 2021-03-02 DIAGNOSIS — Z23 IMMUNIZATION DUE: ICD-10-CM

## 2021-03-05 ENCOUNTER — OFFICE VISIT (OUTPATIENT)
Dept: NEUROSURGERY | Facility: CLINIC | Age: 76
End: 2021-03-05

## 2021-03-05 DIAGNOSIS — G91.2 NPH (NORMAL PRESSURE HYDROCEPHALUS) (HCC): ICD-10-CM

## 2021-03-05 DIAGNOSIS — Z98.2 S/P VP SHUNT: Primary | ICD-10-CM

## 2021-03-05 PROCEDURE — 99441 PR PHYS/QHP TELEPHONE EVALUATION 5-10 MIN: CPT | Performed by: NEUROLOGICAL SURGERY

## 2021-03-05 RX ORDER — POTASSIUM CHLORIDE 750 MG/1
10 TABLET, EXTENDED RELEASE ORAL DAILY
COMMUNITY
Start: 2021-01-11 | End: 2021-04-09

## 2021-03-05 RX ORDER — LOSARTAN POTASSIUM 25 MG/1
25 TABLET ORAL DAILY
COMMUNITY
Start: 2021-01-18 | End: 2021-03-10 | Stop reason: ALTCHOICE

## 2021-03-05 RX ORDER — HYDRALAZINE HYDROCHLORIDE 50 MG/1
50 TABLET, FILM COATED ORAL 3 TIMES DAILY
Status: ON HOLD | COMMUNITY
Start: 2021-01-15 | End: 2022-04-29 | Stop reason: SDUPTHER

## 2021-03-05 NOTE — TELEPHONE ENCOUNTER
Addressed during visit 3/5/21  
Caller: APOORVA HDEZ     Relationship to patient: SELF    Best call back number: [phone]    Patient is needing: PATIENT STATED SHE IS FEELING A KNOT IN HER RT BREAST THAT IS PAINFUL AND IS CURIOUS IF THE TUBING DR. MORENO PUT IN ON 11/19/20 GOES AROUND TO THE FRONT. SHE IS CONCERNED SHE MAY OF MOVED IT AROUND. PLEASE ADVISE.           
Caller: APOORVA HDEZ    Relationship to patient: SELF    Best call back number: [phone] OKAY TO LEAVE A MSG    Patient is needing: PATIENT IS CALLING FOR AN UPDATE, PLEASE ADVISE.           
Please advise. Op note from 11/19/20 listed below. Patient is scheduled to see you again 3/8/21.  Op Note by Xavi Silverio MD (11/19/2020 14:30)        
Please review  
Yes

## 2021-03-05 NOTE — PROGRESS NOTES
Subjective   History of Present Illness: Mary Boyle is a 75 y.o. female is here today for 2 month follow-up via telephone. She was last in the office 1/8/21 for f/u of NPH.  Today she reports that she is doing well.  She reports that her gait instability has improved since the surgery as well as her cognitive functions.  She reports her memory is improving.  She reports that she still has some urinary incontinence at times however this has improved slightly.  She is hopeful that she can begin driving again soon.  She has noticed a lump on her right breast and is going to ask her primary care physician about this next week.  I have told her if they think it is related to the shunt tubing that she should follow-up with an in person visit with me so I can evaluate the tubing for any breaks or kinks..     You have chosen to receive care through a telephone visit. Do you consent to use a telephone visit for your medical care today? Yes      History of Present Illness    The following portions of the patient's history were reviewed and updated as appropriate: allergies, past family history, past medical history, past social history, past surgical history and problem list.    Past Medical History:   Diagnosis Date   • Arthritis    • Asthma    • Chest pain    • Depression    • Dizziness 11/11/2020    Went to ER with dizziness and generalized weakness--found to have hydrocephalus.    • Ganglion cyst     right leg   • GERD (gastroesophageal reflux disease)    • Gout    • Hiatal hernia    • Hypertension    • Hypokalemia    • Mild mitral regurgitation    • Normal pressure hydrocephalus (CMS/HCC)    • Personality disorder (CMS/HCC)     PER NOTE IN EPIC    • PONV (postoperative nausea and vomiting)    • Recovering alcoholic (CMS/HCC)     SOBER SINCE 2003   • Thyroid nodule    • Tricuspid regurgitation    • Vitamin D deficiency         Past Surgical History:   Procedure Laterality Date   • APPENDECTOMY     • BREAST  "LUMPECTOMY     • CHOLECYSTECTOMY     • GANGLION CYST EXCISION     • HERNIA REPAIR     • HYSTERECTOMY     •  SHUNT INSERTION N/A 2020    Procedure: Right sided VENTRICULAR PERITONEAL SHUNT INSERTION STERIOTACTIC;  Surgeon: Xavi Silverio MD;  Location: [facility];  Service: Neurosurgery;  Laterality: N/A;          Current Outpatient Medications:   •  acetaminophen (TYLENOL) 325 MG tablet, Take 2 tablets by mouth Every 4 (Four) Hours As Needed for Mild Pain . (Patient taking differently: Take 325 mg by mouth Every 4 (Four) Hours As Needed for Mild Pain .), Disp:  , Rfl:   •  albuterol (VENTOLIN HFA) 108 (90 BASE) MCG/ACT inhaler, 2 puffs Every 4 (Four) Hours As Needed., Disp: , Rfl:   •  allopurinol (ZYLOPRIM) 100 MG tablet, Take 1 tablet by mouth Daily., Disp: 90 tablet, Rfl: 3  •  calcium carbonate (TUMS) 500 MG chewable tablet, Chew 1,000 mg 3 (Three) Times a Day As Needed for Indigestion or Heartburn., Disp:  , Rfl:   •  furosemide (LASIX) 20 MG tablet, Take 20 mg by mouth Daily., Disp: , Rfl:   •  hydrALAZINE (APRESOLINE) 50 MG tablet, Take 50 mg by mouth 3 (Three) Times a Day., Disp: , Rfl:   •  losartan (COZAAR) 25 MG tablet, Take 25 mg by mouth Daily., Disp: , Rfl:   •  potassium chloride (K-DUR,KLOR-CON) 10 MEQ CR tablet, Take 10 mEq by mouth Daily., Disp: , Rfl:      Social History     Socioeconomic History   • Marital status:      Spouse name: Not on file   • Number of children: Not on file   • Years of education: Not on file   • Highest education level: Not on file   Occupational History   • Occupation: retired   Tobacco Use   • Smoking status: Former Smoker     Packs/day: 2.00     Years: 20.00     Pack years: 40.00     Types: Cigarettes     Quit date:      Years since quittin.1   • Smokeless tobacco: Never Used   Substance and Sexual Activity   • Alcohol use: No     Comment: \"15 years sobriety\"   • Drug use: No   • Sexual activity: Never     Birth control/protection: " Surgical        Family History   Problem Relation Age of Onset   • Heart attack Other    • Heart disease Other    • Hypertension Father    • Stroke Father    • Malig Hyperthermia Neg Hx         Review of Systems   Eyes: Negative for visual disturbance.   Genitourinary: Negative for difficulty urinating and enuresis.        + bowel incontinence   - bladder incontinence   Musculoskeletal: Positive for gait problem (Instability when first getting up; uses walker at all times).   Neurological: Positive for dizziness (when first getting up). Negative for speech difficulty, light-headedness and headaches.   Psychiatric/Behavioral: Negative for confusion and decreased concentration.       Objective     Vitals:    21 1123   BP: Comment: No V/S performed     There is no height or weight on file to calculate BMI.      Physical Exam  Neurologic Exam  No physical exam as this was a telephone visit      Assessment/Plan   Independent Review of Radiographic Studies:      I personally reviewed the images from the following studies.  No new imaging studies    Medical Decision Makin-year-old female status post  shunt on 2020 for NPH  -She is doing well today and reports that her balance and cognitive functions have improved significantly since placement of the shunt.. She has had some improvement with her urinary incontinence however still has occasional episodes.  She is hopeful that she will be able to start driving again soon.  -She is working with physical therapy and improving her gait.  She still uses a rolling walker.   -She reports a lump on her right breast near the shunt tubing.  She is can follow-up with her primary care physician for further evaluation.  I recommended that if there is any concerns that she could come in for an in person visit and I could evaluate this as well.  This could also be evaluated with an x-ray to evaluate the tubing if needed.  Overall it sounds like she is recovering well  from the procedure.   -I will plan for a 3-month follow-up visit in person to evaluate for any changes in her NPH symptoms  This was a telephone visit we spoke for approximately 10 minutes    Diagnoses and all orders for this visit:    1. S/P  shunt (Primary)    2. NPH (normal pressure hydrocephalus) (CMS/HCC)      Return in about 3 months (around 6/5/2021).

## 2021-03-09 RX ORDER — CHOLECALCIFEROL (VITAMIN D3) 25 MCG
1000 CAPSULE ORAL DAILY
COMMUNITY

## 2021-03-09 NOTE — PROGRESS NOTES
FOLLOW UP 1 YEAR   Subjective:        Mary Boyle is a 75 y.o. female who here for follow up    CC  Follow-up hypertension  HPI  75 years old female with a known history of benign essential arterial hypertension, hypercholesterolemia and shortness of breath here for the follow-up complaining blood pressure is running high     Problems Addressed this Visit        Other    Essential hypertension - Primary    Relevant Medications    amlodipine-olmesartan (Matthias) 5-20 MG per tablet    hydroCHLOROthiazide (MICROZIDE) 12.5 MG capsule    Other Relevant Orders    Holter Monitor - 24 Hour    Hypercholesterolemia    Relevant Orders    Holter Monitor - 24 Hour    SOB (shortness of breath)    Relevant Orders    Holter Monitor - 24 Hour      Diagnoses       Codes Comments    Essential hypertension    -  Primary ICD-10-CM: I10  ICD-9-CM: 401.9     Hypercholesterolemia     ICD-10-CM: E78.00  ICD-9-CM: 272.0     SOB (shortness of breath)     ICD-10-CM: R06.02  ICD-9-CM: 786.05         .    The following portions of the patient's history were reviewed and updated as appropriate: allergies, current medications, past family history, past medical history, past social history, past surgical history and problem list.    Past Medical History:   Diagnosis Date   • Arthritis    • Asthma    • Chest pain    • Depression    • Dizziness 11/11/2020    Went to ER with dizziness and generalized weakness--found to have hydrocephalus.    • Ganglion cyst     right leg   • GERD (gastroesophageal reflux disease)    • Gout    • Hiatal hernia    • Hypertension    • Hypokalemia    • Mild mitral regurgitation    • Normal pressure hydrocephalus (CMS/HCC)    • Personality disorder (CMS/HCC)     PER NOTE IN EPIC    • PONV (postoperative nausea and vomiting)    • Recovering alcoholic (CMS/HCC)     SOBER SINCE 2003   • Thyroid nodule    • Tricuspid regurgitation    • Vitamin D deficiency      reports that she quit smoking about 16 years ago. Her smoking use  "included cigarettes. She has a 40.00 pack-year smoking history. She has never used smokeless tobacco. She reports that she does not drink alcohol and does not use drugs.   Family History   Problem Relation Age of Onset   • Heart attack Other    • Heart disease Other    • Hypertension Father    • Stroke Father    • Malig Hyperthermia Neg Hx        Review of Systems  Constitutional: No wt loss, fever, fatigue  Gastrointestinal: No nausea, abdominal pain  Behavioral/Psych: No insomnia or anxiety   Cardiovascular no chest pain  Objective:       Physical Exam  BP (!) 190/73 (BP Location: Left arm, Patient Position: Sitting)   Pulse 52   Ht 147.3 cm (58\")   Wt 56.2 kg (124 lb)   BMI 25.92 kg/m²   General appearance: No acute changes   Neck: Trachea midline; NECK, supple, no thyromegaly or lymphadenopathy   Lungs: Normal size and shape, normal breath sounds, equal distribution of air, no rales and rhonchi   CV: S1-S2 regular, no murmurs, no rub, no gallop   Abdomen: Soft, non-tender; no masses , no abnormal abdominal sounds   Extremities: No deformity , normal color , no peripheral edema   Skin: Normal temperature, turgor and texture; no rash, ulcers          Procedures      Echocardiogram:        Current Outpatient Medications:   •  acetaminophen (TYLENOL) 325 MG tablet, Take 2 tablets by mouth Every 4 (Four) Hours As Needed for Mild Pain . (Patient taking differently: Take 325 mg by mouth Every 4 (Four) Hours As Needed for Mild Pain .), Disp:  , Rfl:   •  albuterol (VENTOLIN HFA) 108 (90 BASE) MCG/ACT inhaler, 2 puffs Every 4 (Four) Hours As Needed., Disp: , Rfl:   •  allopurinol (ZYLOPRIM) 100 MG tablet, Take 1 tablet by mouth Daily., Disp: 90 tablet, Rfl: 3  •  Ascorbic Acid (Vitamin C) 500 MG chewable tablet, Chew., Disp: , Rfl:   •  calcium carbonate (TUMS) 500 MG chewable tablet, Chew 1,000 mg 3 (Three) Times a Day As Needed for Indigestion or Heartburn., Disp:  , Rfl:   •  Cholecalciferol (Vitamin D-3) 25 MCG " (1000 UT) capsule, Take  by mouth., Disp: , Rfl:   •  esomeprazole (nexIUM) 20 MG capsule, Take 20 mg by mouth Every Morning Before Breakfast., Disp: , Rfl:   •  furosemide (LASIX) 20 MG tablet, Take 20 mg by mouth Daily., Disp: , Rfl:   •  hydrALAZINE (APRESOLINE) 50 MG tablet, Take 50 mg by mouth 3 (Three) Times a Day., Disp: , Rfl:   •  losartan (COZAAR) 25 MG tablet, Take 25 mg by mouth Daily., Disp: , Rfl:   •  potassium chloride (K-DUR,KLOR-CON) 10 MEQ CR tablet, Take 10 mEq by mouth Daily., Disp: , Rfl:   •  Probiotic Product (HEALTHY COLON PO), Take  by mouth., Disp: , Rfl:    Assessment:        Patient Active Problem List   Diagnosis   • Chest pain   • Colon polyp   • COPD (chronic obstructive pulmonary disease) (CMS/[facility])   • Diverticulosis   • Essential hypertension   • GERD without esophagitis   • Hypercholesterolemia   • Osteopenia   • Multiple thyroid nodules   • Chronic fatigue   • Prediabetes   • Vitamin D deficiency   • Renal insufficiency   • Leg swelling   • Hyperuricemia   • SOB (shortness of breath)   • Generalized weakness   • NPH (normal pressure hydrocephalus) (CMS/[facility])               Plan:            ICD-10-CM ICD-9-CM   1. Essential hypertension  I10 401.9   2. Hypercholesterolemia  E78.00 272.0   3. SOB (shortness of breath)  R06.02 786.05     1. Essential hypertension  Adjust the medication  - Holter Monitor - 24 Hour; Future    2. Hypercholesterolemia  Continue current treatment  - Holter Monitor - 24 Hour; Future    3. SOB (shortness of breath)  Multifactorial  - Holter Monitor - 24 Hour; Future       dclosartan    Start shaun  5/20 po daily  Pros and cons of this new medication / change medication has been explained to  the patient    Possible side effects has been explained    Associated need of the blood  Work has been explained    Need for the compliance of the medication has been explained      holter    See in 2 wks  COUNSELING:    Mary Segura was given to patient for  the following topics: diagnostic results, risk factor reductions, impressions, risks and benefits of treatment options and importance of treatment compliance .       SMOKING COUNSELING:    [unfilled]    Dictated using Dragon dictation

## 2021-03-10 ENCOUNTER — OFFICE VISIT (OUTPATIENT)
Dept: CARDIOLOGY | Age: 76
End: 2021-03-10

## 2021-03-10 VITALS
HEIGHT: 58 IN | WEIGHT: 124 LBS | DIASTOLIC BLOOD PRESSURE: 73 MMHG | BODY MASS INDEX: 26.03 KG/M2 | HEART RATE: 52 BPM | SYSTOLIC BLOOD PRESSURE: 190 MMHG

## 2021-03-10 DIAGNOSIS — E78.00 HYPERCHOLESTEROLEMIA: ICD-10-CM

## 2021-03-10 DIAGNOSIS — I10 ESSENTIAL HYPERTENSION: Primary | ICD-10-CM

## 2021-03-10 DIAGNOSIS — R06.02 SOB (SHORTNESS OF BREATH): ICD-10-CM

## 2021-03-10 PROCEDURE — 99214 OFFICE O/P EST MOD 30 MIN: CPT | Performed by: INTERNAL MEDICINE

## 2021-03-10 RX ORDER — AMLODIPINE AND OLMESARTAN MEDOXOMIL 5; 20 MG/1; MG/1
1 TABLET ORAL DAILY
Qty: 30 TABLET | Refills: 6 | Status: SHIPPED | OUTPATIENT
Start: 2021-03-10 | End: 2021-03-31

## 2021-03-10 RX ORDER — HYDROCHLOROTHIAZIDE 12.5 MG/1
12.5 CAPSULE, GELATIN COATED ORAL DAILY
Qty: 30 CAPSULE | Refills: 11 | Status: SHIPPED | OUTPATIENT
Start: 2021-03-10 | End: 2021-04-09 | Stop reason: ALTCHOICE

## 2021-03-11 ENCOUNTER — TELEPHONE (OUTPATIENT)
Dept: CARDIOLOGY | Facility: CLINIC | Age: 76
End: 2021-03-11

## 2021-03-11 NOTE — TELEPHONE ENCOUNTER
Pt called stating that she needed approval from Dr. SANTOS stating its ok for her to take due to allergic reaction from a while ago. Her PCP took her off of the Amplodipine due to her legs swelling.

## 2021-03-11 NOTE — TELEPHONE ENCOUNTER
Pt called states she was given Matthias by Dr SANTOS in office yesterday. Pharmacy called her and informed pt they could not fill do to her having leg swelling with Amlodipine before.    Per Dr SANTOS pt can take do to low dose of Amlodipine in combo medication.     If pt does have a little swelling to wear compression stockings and elevate legs    Informed pt and pharmacy

## 2021-03-17 ENCOUNTER — TELEPHONE (OUTPATIENT)
Dept: NEUROSURGERY | Facility: CLINIC | Age: 76
End: 2021-03-17

## 2021-03-17 NOTE — TELEPHONE ENCOUNTER
Provider:DR MORENO   Caller: APOORVA HDEZ   Relationship to Patient: SELF    Phone Number: [phone]    Reason for Call: PT CALLED AND WANTS TO KNOW WHAT  PLACED THE TUBING, STATED THAT SHE IS STILLING HAVING PAIN IN HER BREAST. SHE DID SEE HER PCP AND THEY DID COMPLETE AN XRAY. SHE IS FOLLOWING UP WITH PCP BUT WANTS THE DRS NAME WHO DID THE TUBING.     SHE WOULD ALSO LIKE TO RESCHEDULE HER APPT ON 06/04/2021 DUE TO HER FAMILY BEING OUT OF TOWN AND UNABLE TO GET A RIDE.     PLEASE CALL PT AND ADVISE THANK YOU

## 2021-03-17 NOTE — TELEPHONE ENCOUNTER
I s/w patient and she stated that XR from Dr. Jones's office shows that tubing is curled up in her abdomen. She wasn't sure if that is how it is supposed to be. She went on to state that she is scheduled for a Breast US and CT Abd next week @ [facility]. I advised that she should complete that and f/u with us afterwards with results.     Any further recommendations?

## 2021-03-31 RX ORDER — AMLODIPINE AND OLMESARTAN MEDOXOMIL 5; 20 MG/1; MG/1
1 TABLET ORAL DAILY
Qty: 30 TABLET | Refills: 0 | Status: SHIPPED | OUTPATIENT
Start: 2021-03-31 | End: 2021-05-10 | Stop reason: SDUPTHER

## 2021-03-31 NOTE — TELEPHONE ENCOUNTER
Patient had prescription sent to [facility] from [facility] for her amlodipine-olmesartin but it was too expensive at [facility] but [facility] cannot get a hold of [facility] to transfer the prescription back so they need a new prescription sent to [facility], patient also says she is short of breath and her bp is elevated and heart rate is in the 40's, please call patient to advise.

## 2021-03-31 NOTE — TELEPHONE ENCOUNTER
S/W PT.  BP WAS HIGH BEFORE TAKING ANY MEDICINE THIS MORNING.  I HAVE ASKED THAT SHE CHECK BP AND HR 1 1/2 HOURS AFTER TAKING MEDICATION.  SHE WILL BRING LOG TO NEXT APPT ON 4/9/21

## 2021-04-09 ENCOUNTER — OFFICE VISIT (OUTPATIENT)
Dept: CARDIOLOGY | Facility: CLINIC | Age: 76
End: 2021-04-09

## 2021-04-09 VITALS
BODY MASS INDEX: 24.8 KG/M2 | HEIGHT: 59 IN | DIASTOLIC BLOOD PRESSURE: 61 MMHG | HEART RATE: 49 BPM | SYSTOLIC BLOOD PRESSURE: 177 MMHG | WEIGHT: 123 LBS

## 2021-04-09 DIAGNOSIS — I10 ESSENTIAL HYPERTENSION: ICD-10-CM

## 2021-04-09 DIAGNOSIS — R06.02 SOB (SHORTNESS OF BREATH): Primary | ICD-10-CM

## 2021-04-09 DIAGNOSIS — Z71.2 ENCOUNTER TO DISCUSS TEST RESULTS: ICD-10-CM

## 2021-04-09 DIAGNOSIS — E78.00 HYPERCHOLESTEROLEMIA: ICD-10-CM

## 2021-04-09 PROCEDURE — 99214 OFFICE O/P EST MOD 30 MIN: CPT | Performed by: NURSE PRACTITIONER

## 2021-04-09 RX ORDER — FUROSEMIDE 20 MG/1
10 TABLET ORAL DAILY
COMMUNITY
Start: 2021-04-05

## 2021-04-09 RX ORDER — IPRATROPIUM BROMIDE AND ALBUTEROL SULFATE 2.5; .5 MG/3ML; MG/3ML
SOLUTION RESPIRATORY (INHALATION)
COMMUNITY
Start: 2021-04-07

## 2021-04-09 NOTE — PROGRESS NOTES
Subjective:        Mary Boyle is a 75 y.o. female who here for follow up    No chief complaint on file.    She is here today for results.    HPI  Mary Boyle is a 75-year-old female, who is new to me.  She has a history to include hypertension, hyperlipidemia, gout, CKD, multiple thyroid nodules, prediabetes, warmer smoker, vitamin D deficiency, and GERD.  Recently went to her primary doctor and was noted to be more shortness of breath with bilateral lower extremity swelling.  At that time she had a chest x-ray which revealed bilateral pleural effusions right greater than left.  Her hydrochlorothiazide was discontinued and she was started back on her Lasix.  He was asked to go to emergency with any worsening symptoms.    Previous stress test in 2019 indicated a normal myocardial perfusion study with no evidence of ischemia.  Her echo on 2019 indicated no evidence of pericardial effusion.  EF 64%.  Recent lipid panel on 11/11/2020 revealed , HDL 65, LDL 66, and triglycerides 178.        The following portions of the patient's history were reviewed and updated as appropriate: allergies, current medications, past family history, past medical history, past social history, past surgical history and problem list.    Past Medical History:   Diagnosis Date   • Arthritis    • Asthma    • Chest pain    • Depression    • Dizziness 11/11/2020    Went to ER with dizziness and generalized weakness--found to have hydrocephalus.    • Ganglion cyst     right leg   • GERD (gastroesophageal reflux disease)    • Gout    • Hiatal hernia    • Hypertension    • Hypokalemia    • Mild mitral regurgitation    • Normal pressure hydrocephalus (CMS/HCC)    • Personality disorder (CMS/HCC)     PER NOTE IN EPIC    • PONV (postoperative nausea and vomiting)    • Recovering alcoholic (CMS/HCC)     SOBER SINCE 2003   • Thyroid nodule    • Tricuspid regurgitation    • Vitamin D deficiency          reports that she quit smoking about  16 years ago. Her smoking use included cigarettes. She has a 40.00 pack-year smoking history. She has never used smokeless tobacco. She reports that she does not drink alcohol and does not use drugs.     Family History   Problem Relation Age of Onset   • Heart attack Other    • Heart disease Other    • Hypertension Father    • Stroke Father    • Malig Hyperthermia Neg Hx        ROS     Review of Systems  Constitutional:No wt loss, fever, fatigue  Gastrointestinal: No nausea, abdominal pain  Behavioral/Psych: No insomnia or anxiety  Cardiovascular: Denies chest pain, shortness of breath has improved.      Objective:           Vitals and nursing note reviewed.   Constitutional:       Appearance: Well-developed.   HENT:      Head: Normocephalic.      Right Ear: External ear normal.      Left Ear: External ear normal.   Neck:      Vascular: No JVD.   Pulmonary:      Effort: Pulmonary effort is normal. No respiratory distress.      Breath sounds: Normal breath sounds. No stridor. No rales.   Cardiovascular:      Normal rate. Regular rhythm.      No gallop.   Pulses:     Intact distal pulses.   Abdominal:      General: Bowel sounds are normal. There is no distension.      Palpations: Abdomen is soft.      Tenderness: There is no abdominal tenderness. There is no guarding.   Musculoskeletal: Normal range of motion.         General: No tenderness.      Cervical back: Normal range of motion. Skin:     General: Skin is warm.   Neurological:      Mental Status: Alert and oriented to person, place, and time.      Deep Tendon Reflexes: Reflexes are normal and symmetric.   Psychiatric:         Judgment: Judgment normal.         Procedures    Interpretation Summary       · Findings consistent with a normal ECG stress test.  · Findings consistent with a normal ECG stress test.  · Left ventricular ejection fraction is hyperdynamic (Calculated EF > 70%).  · Myocardial perfusion imaging indicates a normal myocardial perfusion study  with no evidence of ischemia.  · Impressions are consistent with a low risk study.     Asymptomatic for chest pain. ECG is negative for ischemia. Baseline EKG has non-specific ST T wave abnormality Anterior leads, no changes in EKG noted.  Ectopy: Occasional PVC at baseline and through out.   B/P is appropriate.   Pharmacologic study due to inability to tolerate increasing speed and grade of treadmill due to poor mobility, Beta-blocker therapy and c/o very SOA with minimal activity. Breath sounds are clear, diminished in bases and SpO2 100%  Participated in Low Level exercise and tolerance is very poor.     Supervised by:  Ines RAMIREZ.            Interpretation Summary    · Calculated EF = 64%.  · There is no evidence of pericardial effusion.              Current Outpatient Medications:   •  acetaminophen (TYLENOL) 325 MG tablet, Take 2 tablets by mouth Every 4 (Four) Hours As Needed for Mild Pain . (Patient taking differently: Take 325 mg by mouth Every 4 (Four) Hours As Needed for Mild Pain .), Disp:  , Rfl:   •  albuterol (VENTOLIN HFA) 108 (90 BASE) MCG/ACT inhaler, 2 puffs Every 4 (Four) Hours As Needed., Disp: , Rfl:   •  allopurinol (ZYLOPRIM) 100 MG tablet, Take 1 tablet by mouth Daily., Disp: 90 tablet, Rfl: 3  •  amlodipine-olmesartan (ROXANNA) 5-20 MG per tablet, TAKE 1 TABLET BY MOUTH DAILY, Disp: 30 tablet, Rfl: 0  •  Ascorbic Acid (Vitamin C) 500 MG chewable tablet, Chew., Disp: , Rfl:   •  calcium carbonate (TUMS) 500 MG chewable tablet, Chew 1,000 mg 3 (Three) Times a Day As Needed for Indigestion or Heartburn., Disp:  , Rfl:   •  Cholecalciferol (Vitamin D-3) 25 MCG (1000 UT) capsule, Take  by mouth., Disp: , Rfl:   •  esomeprazole (nexIUM) 20 MG capsule, Take 20 mg by mouth Every Morning Before Breakfast., Disp: , Rfl:   •  hydrALAZINE (APRESOLINE) 50 MG tablet, Take 50 mg by mouth 3 (Three) Times a Day., Disp: , Rfl:   •  hydroCHLOROthiazide (MICROZIDE) 12.5 MG capsule, Take 1 capsule by  mouth Daily., Disp: 30 capsule, Rfl: 11  •  potassium chloride (K-DUR,KLOR-CON) 10 MEQ CR tablet, Take 10 mEq by mouth Daily., Disp: , Rfl:   •  Probiotic Product (HEALTHY COLON PO), Take  by mouth., Disp: , Rfl:      Assessment:        Patient Active Problem List   Diagnosis   • Chest pain   • Colon polyp   • COPD (chronic obstructive pulmonary disease) (CMS/HCC)   • Diverticulosis   • Essential hypertension   • GERD without esophagitis   • Hypercholesterolemia   • Osteopenia   • Multiple thyroid nodules   • Chronic fatigue   • Prediabetes   • Vitamin D deficiency   • Renal insufficiency   • Leg swelling   • Hyperuricemia   • SOB (shortness of breath)   • Generalized weakness   • NPH (normal pressure hydrocephalus) (CMS/HCC)               Plan:   1.  Results: Holter was an abnormal study. Holter showed normal sinus rhythm with frequent PACs, and 2 non sustained VT.  Average heart rate was 56 bpm with a minimum of 43 bpm and a maximum 100 bpm.  I will order an echo due to significant recent swelling and shortness of breath.  She will follow-up with Dr. SANTOS and discuss if she is still symptomatic as she may need a pacemaker or AICD depending on what echo reveals.    2. Hypertension: her blood pressure is elevated today. She states she took her medications prior to coming to office.    Educated patient on exercising for at least 30 minutes a day for 2 to 3 days a week. Importance of controlling hypertension and blood pressure checkup on the regular basis has been explained. Hypertension as a silent killer has been discussed. Risk reduction of the weight and regular exercises to control the hypertension has been explained.    3. Shortness of breath with BLE swelling: I reviewed labs, chest x-ray, and CT.  Her PCP restarted her Lasix and is planning lab work in approximately next week.  No rales or BLE swelling on exam.  She states she is feeling improvement with her shortness of breath.  Previous echo as above.  I will  have her have an echo and follow-up with Dr. Shanks the same day.             No diagnosis found.    There are no diagnoses linked to this encounter.    COUNSELING: ivon Segura was given to patient for the following topics: diagnostic results, risk factor reductions, impressions, risks and benefits of treatment options and importance of treatment compliance .       SMOKING COUNSELING: former smoker    She will follow-up with Dr. Shanks for with an echo.    Sincerely,   JAMES Trejo  [facility]  04/09/21  11:17 EDT     EMR Dragon/Transcription disclaimer:   Much of this encounter note is an electronic transcription/translation of spoken language to printed text. The electronic translation of spoken language may permit erroneous, or at times, nonsensical words or phrases to be inadvertently transcribed; Although I have reviewed the note for such errors, some may still exist.

## 2021-04-12 NOTE — TELEPHONE ENCOUNTER
Caller: Mary Boyle    Relationship to patient: Self    Best call back number: [phone]    Chief complaint: KNOTS     Type of visit: FOLLOW UP    Requested date: ASAP     If rescheduling, when is the original appointment:NA    Additional notes:PT CALLED AND IS WANTING TO SEE IF SHE CAN HAVE AN EARLIER APPT. WITH . PT HAS 3 KNOTS BY HER INCISION SIGHT ON HER HEAD AND PT IS CONCERNED. PT ALSO STATED SHE WOULD LIKE EARLIER APPT. DUE TO HER FAMILY BEING OUT OF TOWN. PLEASE ADVISE THANK YOU

## 2021-04-22 NOTE — TELEPHONE ENCOUNTER
Patient called and was informed of Dr. Silverio's response. She wanted to let you know that she had her 2nd COVID vaccine on 3/15/21 and since that time she has been having trouble hearing. She was seen by her PCP and he told her that her ear canal was closing off. He placed her on Allegra & Flonase.

## 2021-05-10 ENCOUNTER — OFFICE VISIT (OUTPATIENT)
Dept: CARDIOLOGY | Facility: CLINIC | Age: 76
End: 2021-05-10

## 2021-05-10 ENCOUNTER — HOSPITAL ENCOUNTER (OUTPATIENT)
Dept: CARDIOLOGY | Facility: HOSPITAL | Age: 76
Discharge: HOME | End: 2021-05-10
Admitting: NURSE PRACTITIONER

## 2021-05-10 VITALS
HEIGHT: 59 IN | DIASTOLIC BLOOD PRESSURE: 58 MMHG | BODY MASS INDEX: 24.89 KG/M2 | HEART RATE: 43 BPM | OXYGEN SATURATION: 99 % | SYSTOLIC BLOOD PRESSURE: 142 MMHG | WEIGHT: 123.46 LBS

## 2021-05-10 VITALS
BODY MASS INDEX: 23.99 KG/M2 | HEIGHT: 59 IN | WEIGHT: 119 LBS | DIASTOLIC BLOOD PRESSURE: 70 MMHG | HEART RATE: 43 BPM | SYSTOLIC BLOOD PRESSURE: 162 MMHG

## 2021-05-10 DIAGNOSIS — R06.02 SHORTNESS OF BREATH: ICD-10-CM

## 2021-05-10 DIAGNOSIS — R00.1 SYMPTOMATIC BRADYCARDIA: ICD-10-CM

## 2021-05-10 DIAGNOSIS — I10 ESSENTIAL HYPERTENSION: ICD-10-CM

## 2021-05-10 DIAGNOSIS — R00.1 BRADYCARDIA, SINUS: Primary | ICD-10-CM

## 2021-05-10 DIAGNOSIS — E78.00 HYPERCHOLESTEROLEMIA: ICD-10-CM

## 2021-05-10 DIAGNOSIS — I49.5 SSS (SICK SINUS SYNDROME) (HCC): ICD-10-CM

## 2021-05-10 LAB
AORTIC ARCH: 2.6 CM
AORTIC DIMENSIONLESS INDEX: 0.7 (DI)
ASCENDING AORTA: 3.3 CM
BH CV ECHO MEAS - ACS: 1.8 CM
BH CV ECHO MEAS - AO ACC TIME: 0.09 SEC
BH CV ECHO MEAS - AO MAX PG (FULL): 9.8 MMHG
BH CV ECHO MEAS - AO MAX PG: 19.7 MMHG
BH CV ECHO MEAS - AO MEAN PG (FULL): 3.8 MMHG
BH CV ECHO MEAS - AO MEAN PG: 8.7 MMHG
BH CV ECHO MEAS - AO ROOT AREA (BSA CORRECTED): 1.8
BH CV ECHO MEAS - AO ROOT AREA: 6 CM^2
BH CV ECHO MEAS - AO ROOT DIAM: 2.8 CM
BH CV ECHO MEAS - AO V2 MAX: 222 CM/SEC
BH CV ECHO MEAS - AO V2 MEAN: 140.4 CM/SEC
BH CV ECHO MEAS - AO V2 VTI: 52.4 CM
BH CV ECHO MEAS - ASC AORTA: 3.3 CM
BH CV ECHO MEAS - AVA(I,A): 2.3 CM^2
BH CV ECHO MEAS - AVA(I,D): 2.3 CM^2
BH CV ECHO MEAS - AVA(V,A): 2.2 CM^2
BH CV ECHO MEAS - AVA(V,D): 2.2 CM^2
BH CV ECHO MEAS - BSA(HAYCOCK): 1.5 M^2
BH CV ECHO MEAS - BSA: 1.5 M^2
BH CV ECHO MEAS - BZI_BMI: 25.2 KILOGRAMS/M^2
BH CV ECHO MEAS - BZI_METRIC_HEIGHT: 149 CM
BH CV ECHO MEAS - BZI_METRIC_WEIGHT: 56 KG
BH CV ECHO MEAS - EDV(CUBED): 122.4 ML
BH CV ECHO MEAS - EDV(MOD-SP2): 115 ML
BH CV ECHO MEAS - EDV(MOD-SP4): 97 ML
BH CV ECHO MEAS - EDV(TEICH): 116.4 ML
BH CV ECHO MEAS - EF(CUBED): 81.1 %
BH CV ECHO MEAS - EF(MOD-BP): 62.3 %
BH CV ECHO MEAS - EF(MOD-SP2): 63.5 %
BH CV ECHO MEAS - EF(MOD-SP4): 60.8 %
BH CV ECHO MEAS - EF(TEICH): 73.5 %
BH CV ECHO MEAS - ESV(CUBED): 23.2 ML
BH CV ECHO MEAS - ESV(MOD-SP2): 42 ML
BH CV ECHO MEAS - ESV(MOD-SP4): 38 ML
BH CV ECHO MEAS - ESV(TEICH): 30.9 ML
BH CV ECHO MEAS - FS: 42.6 %
BH CV ECHO MEAS - IVS/LVPW: 0.9
BH CV ECHO MEAS - IVSD: 0.79 CM
BH CV ECHO MEAS - LAT PEAK E' VEL: 13.7 CM/SEC
BH CV ECHO MEAS - LV DIASTOLIC VOL/BSA (35-75): 64.8 ML/M^2
BH CV ECHO MEAS - LV MASS(C)D: 141.8 GRAMS
BH CV ECHO MEAS - LV MASS(C)DI: 94.8 GRAMS/M^2
BH CV ECHO MEAS - LV MAX PG: 9.9 MMHG
BH CV ECHO MEAS - LV MEAN PG: 4.9 MMHG
BH CV ECHO MEAS - LV SYSTOLIC VOL/BSA (12-30): 25.4 ML/M^2
BH CV ECHO MEAS - LV V1 MAX: 157.5 CM/SEC
BH CV ECHO MEAS - LV V1 MEAN: 102.2 CM/SEC
BH CV ECHO MEAS - LV V1 VTI: 38.2 CM
BH CV ECHO MEAS - LVIDD: 5 CM
BH CV ECHO MEAS - LVIDS: 2.9 CM
BH CV ECHO MEAS - LVLD AP2: 7.9 CM
BH CV ECHO MEAS - LVLD AP4: 7.7 CM
BH CV ECHO MEAS - LVLS AP2: 5.9 CM
BH CV ECHO MEAS - LVLS AP4: 5.9 CM
BH CV ECHO MEAS - LVOT AREA (M): 3.1 CM^2
BH CV ECHO MEAS - LVOT AREA: 3.1 CM^2
BH CV ECHO MEAS - LVOT DIAM: 2 CM
BH CV ECHO MEAS - LVPWD: 0.88 CM
BH CV ECHO MEAS - MED PEAK E' VEL: 13.3 CM/SEC
BH CV ECHO MEAS - MV A DUR: 0.12 SEC
BH CV ECHO MEAS - MV A MAX VEL: 169.4 CM/SEC
BH CV ECHO MEAS - MV DEC SLOPE: 1110 CM/SEC^2
BH CV ECHO MEAS - MV DEC TIME: 176 SEC
BH CV ECHO MEAS - MV E MAX VEL: 136.1 CM/SEC
BH CV ECHO MEAS - MV E/A: 0.8
BH CV ECHO MEAS - MV MAX PG: 17 MMHG
BH CV ECHO MEAS - MV MEAN PG: 7.6 MMHG
BH CV ECHO MEAS - MV P1/2T MAX VEL: 177.4 CM/SEC
BH CV ECHO MEAS - MV P1/2T: 46.8 MSEC
BH CV ECHO MEAS - MV V2 MAX: 206.3 CM/SEC
BH CV ECHO MEAS - MV V2 MEAN: 121.4 CM/SEC
BH CV ECHO MEAS - MV V2 VTI: 38.9 CM
BH CV ECHO MEAS - MVA P1/2T LCG: 1.2 CM^2
BH CV ECHO MEAS - MVA(P1/2T): 4.7 CM^2
BH CV ECHO MEAS - MVA(VTI): 3.1 CM^2
BH CV ECHO MEAS - PA ACC TIME: 0.16 SEC
BH CV ECHO MEAS - PA MAX PG (FULL): 8.5 MMHG
BH CV ECHO MEAS - PA MAX PG: 10.4 MMHG
BH CV ECHO MEAS - PA PR(ACCEL): 7.8 MMHG
BH CV ECHO MEAS - PA V2 MAX: 161.5 CM/SEC
BH CV ECHO MEAS - PULM A REVS DUR: 0.1 SEC
BH CV ECHO MEAS - PULM A REVS VEL: 26.7 CM/SEC
BH CV ECHO MEAS - PULM DIAS VEL: 77.6 CM/SEC
BH CV ECHO MEAS - PULM S/D: 1.1
BH CV ECHO MEAS - PULM SYS VEL: 86 CM/SEC
BH CV ECHO MEAS - PVA(V,A): 1.3 CM^2
BH CV ECHO MEAS - PVA(V,D): 1.3 CM^2
BH CV ECHO MEAS - QP/QS: 0.38
BH CV ECHO MEAS - RAP SYSTOLE: 3 MMHG
BH CV ECHO MEAS - RV MAX PG: 2 MMHG
BH CV ECHO MEAS - RV MEAN PG: 0.98 MMHG
BH CV ECHO MEAS - RV V1 MAX: 69.8 CM/SEC
BH CV ECHO MEAS - RV V1 MEAN: 46.1 CM/SEC
BH CV ECHO MEAS - RV V1 VTI: 15.4 CM
BH CV ECHO MEAS - RVOT AREA: 2.9 CM^2
BH CV ECHO MEAS - RVOT DIAM: 1.9 CM
BH CV ECHO MEAS - RVSP: 46.9 MMHG
BH CV ECHO MEAS - SI(AO): 210.5 ML/M^2
BH CV ECHO MEAS - SI(CUBED): 66.4 ML/M^2
BH CV ECHO MEAS - SI(LVOT): 79.8 ML/M^2
BH CV ECHO MEAS - SI(MOD-SP2): 48.8 ML/M^2
BH CV ECHO MEAS - SI(MOD-SP4): 39.4 ML/M^2
BH CV ECHO MEAS - SI(TEICH): 57.1 ML/M^2
BH CV ECHO MEAS - SUP REN AO DIAM: 1.4 CM
BH CV ECHO MEAS - SV(AO): 315 ML
BH CV ECHO MEAS - SV(CUBED): 99.3 ML
BH CV ECHO MEAS - SV(LVOT): 119.3 ML
BH CV ECHO MEAS - SV(MOD-SP2): 73 ML
BH CV ECHO MEAS - SV(MOD-SP4): 59 ML
BH CV ECHO MEAS - SV(RVOT): 45.4 ML
BH CV ECHO MEAS - SV(TEICH): 85.5 ML
BH CV ECHO MEAS - TAPSE (>1.6): 1.8 CM
BH CV ECHO MEAS - TR MAX VEL: 331.2 CM/SEC
BH CV ECHO MEASUREMENTS AVERAGE E/E' RATIO: 10.08
BH CV VAS BP LEFT ARM: NORMAL MMHG
BH CV XLRA - RV BASE: 4 CM
BH CV XLRA - RV LENGTH: 6.4 CM
BH CV XLRA - RV MID: 3.4 CM
BH CV XLRA - TDI S': 13.4 CM/SEC
LEFT ATRIUM VOLUME INDEX: 45.1 ML/M2
SINUS: 0.8 CM
STJ: 2.5 CM

## 2021-05-10 PROCEDURE — 93306 TTE W/DOPPLER COMPLETE: CPT

## 2021-05-10 PROCEDURE — 93306 TTE W/DOPPLER COMPLETE: CPT | Performed by: INTERNAL MEDICINE

## 2021-05-10 PROCEDURE — 99214 OFFICE O/P EST MOD 30 MIN: CPT | Performed by: INTERNAL MEDICINE

## 2021-05-10 RX ORDER — FEXOFENADINE HYDROCHLORIDE 60 MG/1
60 TABLET, FILM COATED ORAL DAILY
COMMUNITY

## 2021-05-10 RX ORDER — AMLODIPINE AND OLMESARTAN MEDOXOMIL 5; 20 MG/1; MG/1
1 TABLET ORAL DAILY
Qty: 30 TABLET | Refills: 11 | Status: ON HOLD | OUTPATIENT
Start: 2021-05-10 | End: 2021-05-26

## 2021-05-10 RX ORDER — METOPROLOL SUCCINATE 25 MG/1
25 TABLET, EXTENDED RELEASE ORAL DAILY
Qty: 30 TABLET | Refills: 11 | Status: SHIPPED | OUTPATIENT
Start: 2021-05-10 | End: 2022-04-29 | Stop reason: HOSPADM

## 2021-05-10 NOTE — PROGRESS NOTES
RESULTS ECHO   Subjective:        Mary Boyle is a 75 y.o. female who here for follow up    CC  BRADYCARDIA  HPI  75 years old female with known history of the benign essential arterial hypertension hypercholesterolemia does have a severe bradycardia here for the follow-up also complains of shortness of breath has been feeling extremely weakness as well as fatigue, patient has been taken off of the beta-blockers and patient complains of significant palpitations as well as a heart beating faster and harder     Problems Addressed this Visit        Cardiac and Vasculature    Essential hypertension    Relevant Medications    amlodipine-olmesartan (ROXANNA) 5-20 MG per tablet    metoprolol succinate XL (TOPROL-XL) 25 MG 24 hr tablet    Hypercholesterolemia    Bradycardia, sinus - Primary    Relevant Medications    metoprolol succinate XL (TOPROL-XL) 25 MG 24 hr tablet    Other Relevant Orders    Case Request Cath Lab: Pacemaker DC new [facility] (Completed)    CBC & Differential    Basic Metabolic Panel    aPTT    Protime-INR      Other Visit Diagnoses     Shortness of breath         Relevant Orders    aPTT      Diagnoses       Codes Comments    Bradycardia, sinus    -  Primary ICD-10-CM: R00.1  ICD-9-CM: 427.89     Shortness of breath      ICD-10-CM: R06.02  ICD-9-CM: 786.05     Essential hypertension     ICD-10-CM: I10  ICD-9-CM: 401.9     Hypercholesterolemia     ICD-10-CM: E78.00  ICD-9-CM: 272.0         .    The following portions of the patient's history were reviewed and updated as appropriate: allergies, current medications, past family history, past medical history, past social history, past surgical history and problem list.    Past Medical History:   Diagnosis Date   • Arthritis    • Asthma    • Chest pain    • Depression    • Dizziness 11/11/2020    Went to ER with dizziness and generalized weakness--found to have hydrocephalus.    • Ganglion cyst     right leg   • GERD (gastroesophageal reflux disease)    •  "Gout    • Hiatal hernia    • Hypertension    • Hypokalemia    • Mild mitral regurgitation    • Normal pressure hydrocephalus (CMS/HCC)    • Personality disorder (CMS/HCC)     PER NOTE IN EPIC    • PONV (postoperative nausea and vomiting)    • Recovering alcoholic (CMS/HCC)     SOBER SINCE 2003   • Thyroid nodule    • Tricuspid regurgitation    • Vitamin D deficiency      reports that she quit smoking about 16 years ago. Her smoking use included cigarettes. She has a 40.00 pack-year smoking history. She has never used smokeless tobacco. She reports that she does not drink alcohol and does not use drugs.   Family History   Problem Relation Age of Onset   • Heart attack Other    • Heart disease Other    • Hypertension Father    • Stroke Father    • Malig Hyperthermia Neg Hx        Review of Systems  Constitutional: No wt loss, fever, fatigue  Gastrointestinal: No nausea, abdominal pain  Behavioral/Psych: No insomnia or anxiety   Cardiovascular palpitation  Objective:       Physical Exam  /70 (BP Location: Left arm, Patient Position: Sitting)   Pulse (!) 43   Ht 149.9 cm (59\")   Wt 54 kg (119 lb)   BMI 24.04 kg/m²   General appearance: No acute changes   Neck: Trachea midline; NECK, supple, no thyromegaly or lymphadenopathy   Lungs: Normal size and shape, normal breath sounds, equal distribution of air, no rales and rhonchi   CV: S1-S2 regular, no murmurs, no rub, no gallop   Abdomen: Soft, non-tender; no masses , no abnormal abdominal sounds   Extremities: No deformity , normal color , no peripheral edema   Skin: Normal temperature, turgor and texture; no rash, ulcers          Procedures      Echocardiogram:        Current Outpatient Medications:   •  acetaminophen (TYLENOL) 325 MG tablet, Take 2 tablets by mouth Every 4 (Four) Hours As Needed for Mild Pain . (Patient taking differently: Take 325 mg by mouth Every 4 (Four) Hours As Needed for Mild Pain .), Disp:  , Rfl:   •  albuterol (VENTOLIN HFA) 108 (90 " BASE) MCG/ACT inhaler, 2 puffs Every 4 (Four) Hours As Needed., Disp: , Rfl:   •  allopurinol (ZYLOPRIM) 100 MG tablet, Take 1 tablet by mouth Daily., Disp: 90 tablet, Rfl: 3  •  amlodipine-olmesartan (ROXANNA) 5-20 MG per tablet, Take 1 tablet by mouth Daily., Disp: 30 tablet, Rfl: 11  •  Ascorbic Acid (Vitamin C) 500 MG chewable tablet, Chew., Disp: , Rfl:   •  calcium carbonate (TUMS) 500 MG chewable tablet, Chew 1,000 mg 3 (Three) Times a Day As Needed for Indigestion or Heartburn., Disp:  , Rfl:   •  Cholecalciferol (Vitamin D-3) 25 MCG (1000 UT) capsule, Take  by mouth., Disp: , Rfl:   •  esomeprazole (nexIUM) 20 MG capsule, Take 20 mg by mouth Every Morning Before Breakfast., Disp: , Rfl:   •  furosemide (LASIX) 20 MG tablet, Take 20 mg by mouth Daily., Disp: , Rfl:   •  hydrALAZINE (APRESOLINE) 50 MG tablet, Take 50 mg by mouth 3 (Three) Times a Day., Disp: , Rfl:   •  ipratropium-albuterol (DUO-NEB) 0.5-2.5 mg/3 ml nebulizer, , Disp: , Rfl:   •  Probiotic Product (HEALTHY COLON PO), Take  by mouth., Disp: , Rfl:   •  fexofenadine (ALLEGRA) 60 MG tablet, Take 60 mg by mouth Daily., Disp: , Rfl:    Assessment:        Patient Active Problem List   Diagnosis   • Chest pain   • Colon polyp   • COPD (chronic obstructive pulmonary disease) (CMS/HCC)   • Diverticulosis   • Essential hypertension   • GERD without esophagitis   • Hypercholesterolemia   • Osteopenia   • Multiple thyroid nodules   • Chronic fatigue   • Prediabetes   • Vitamin D deficiency   • Renal insufficiency   • Leg swelling   • Hyperuricemia   • SOB (shortness of breath)   • Generalized weakness   • NPH (normal pressure hydrocephalus) (CMS/HCC)       ·   abnormal monitor study.  NSR WITH FREQUENT PACS, AND 2 NON SUSTAINED VT      Plan:            ICD-10-CM ICD-9-CM   1. Bradycardia, sinus  R00.1 427.89   2. Shortness of breath   R06.02 786.05   3. Essential hypertension  I10 401.9   4. Hypercholesterolemia  E78.00 272.0     1. SSS, Bradycardia,  sinus  Highly symptomatic we will proceed with the pacemaker  - Case Request Cath Lab: Pacemaker DC new [facility]  - CBC & Differential; Future  - Basic Metabolic Panel; Future  - aPTT; Future  - Protime-INR; Future    2. Shortness of breath   Multifactorial  - aPTT; Future    3. Essential hypertension  Blood pressure under control    4. Hypercholesterolemia  Continue current treatment       SSS, SEVER BRADYCARDIA, WITH FATIGUE    SIG C/O PALPITATIONS    ADD LOW DOSE BETA BLOCKERS    PROCEED WITH PACER    PROCEDURE, RISKS, OPTIONS EXPLAINED    ADD TOPROL 25 MG FOR PALPITATION AND BP  COUNSELING:    Mary Segura was given to patient for the following topics: diagnostic results, risk factor reductions, impressions, risks and benefits of treatment options and importance of treatment compliance .       SMOKING COUNSELING:    [unfilled]    Dictated using Dragon dictation

## 2021-05-12 ENCOUNTER — TRANSCRIBE ORDERS (OUTPATIENT)
Dept: SLEEP MEDICINE | Facility: HOSPITAL | Age: 76
End: 2021-05-12

## 2021-05-12 DIAGNOSIS — Z01.818 OTHER SPECIFIED PRE-OPERATIVE EXAMINATION: Primary | ICD-10-CM

## 2021-05-14 ENCOUNTER — OFFICE VISIT (OUTPATIENT)
Dept: NEUROSURGERY | Facility: CLINIC | Age: 76
End: 2021-05-14

## 2021-05-14 VITALS
SYSTOLIC BLOOD PRESSURE: 130 MMHG | HEART RATE: 41 BPM | HEIGHT: 59 IN | WEIGHT: 123 LBS | DIASTOLIC BLOOD PRESSURE: 52 MMHG | BODY MASS INDEX: 24.8 KG/M2 | OXYGEN SATURATION: 98 %

## 2021-05-14 DIAGNOSIS — Z98.2 S/P VP SHUNT: Primary | ICD-10-CM

## 2021-05-14 DIAGNOSIS — G91.2 NPH (NORMAL PRESSURE HYDROCEPHALUS) (HCC): ICD-10-CM

## 2021-05-14 PROCEDURE — 99214 OFFICE O/P EST MOD 30 MIN: CPT | Performed by: NEUROLOGICAL SURGERY

## 2021-05-18 ENCOUNTER — TRANSCRIBE ORDERS (OUTPATIENT)
Dept: SLEEP MEDICINE | Facility: HOSPITAL | Age: 76
End: 2021-05-18

## 2021-05-18 DIAGNOSIS — Z01.818 OTHER SPECIFIED PRE-OPERATIVE EXAMINATION: Primary | ICD-10-CM

## 2021-05-19 ENCOUNTER — TELEPHONE (OUTPATIENT)
Dept: CARDIOLOGY | Facility: CLINIC | Age: 76
End: 2021-05-19

## 2021-05-19 NOTE — TELEPHONE ENCOUNTER
Home Health nurse ([facility]) stating that pt's BP is dropping readings: 100/56 HR40     108/50 HR 38     112/46 HR 50  PT is due for pacemaker procedure on 5/26/21  Nurse believes its is the Metoprolol 25 mg that is taking her BP down. She just started this medication.     Per Dr. SANTOS: patient is to take a half of tablet of Metoprolol.     LVM for patient to call the office back.     Pt called back stating her BP is 142/49 HR 40. She says she feels fine no symptoms and looks forward to her pacemaker procedure. In formed her to take half tablet of Metoprolol and if any changes in symptoms please call us back. She understood.

## 2021-05-20 ENCOUNTER — TELEPHONE (OUTPATIENT)
Dept: CARDIOLOGY | Facility: CLINIC | Age: 76
End: 2021-05-20

## 2021-05-20 NOTE — TELEPHONE ENCOUNTER
PT RETURNED YOUR CALL.  PT REPORTS /49  HR 44  UPON WAKING THIS MORNING HAS NOT TAKEN ANY MEDS YET.  PLEASE CALL HER ASAP  PT STATES SHE IS IN STAGE 4 KIDNEY FAILURE AND HAS AN APPOINTMENT WITH HER KIDNEY DOCTOR AND WON'T BE HOME AFTER 2:30 SO PLEASE LEAVE A MESSAGE      THANKS

## 2021-05-21 ENCOUNTER — HOSPITAL ENCOUNTER (OUTPATIENT)
Dept: CARDIOLOGY | Facility: HOSPITAL | Age: 76
Discharge: HOME | End: 2021-05-21

## 2021-05-21 DIAGNOSIS — R06.02 SHORTNESS OF BREATH: ICD-10-CM

## 2021-05-21 DIAGNOSIS — R00.1 BRADYCARDIA, SINUS: ICD-10-CM

## 2021-05-24 ENCOUNTER — LAB (OUTPATIENT)
Dept: LAB | Facility: HOSPITAL | Age: 76
End: 2021-05-24

## 2021-05-24 DIAGNOSIS — R06.02 SHORTNESS OF BREATH: ICD-10-CM

## 2021-05-24 DIAGNOSIS — R00.1 SYMPTOMATIC BRADYCARDIA: ICD-10-CM

## 2021-05-24 DIAGNOSIS — I10 ESSENTIAL HYPERTENSION: ICD-10-CM

## 2021-05-24 DIAGNOSIS — E78.00 HYPERCHOLESTEROLEMIA: ICD-10-CM

## 2021-05-24 DIAGNOSIS — R00.1 BRADYCARDIA, SINUS: ICD-10-CM

## 2021-05-24 DIAGNOSIS — Z01.818 OTHER SPECIFIED PRE-OPERATIVE EXAMINATION: ICD-10-CM

## 2021-05-24 DIAGNOSIS — I49.5 SSS (SICK SINUS SYNDROME) (HCC): ICD-10-CM

## 2021-05-24 LAB
ANION GAP SERPL CALCULATED.3IONS-SCNC: 12.6 MMOL/L (ref 5–15)
APTT PPP: 28.5 SECONDS (ref 22.7–35.4)
BASOPHILS # BLD AUTO: 0.03 10*3/MM3 (ref 0–0.2)
BASOPHILS NFR BLD AUTO: 0.4 % (ref 0–1.5)
BUN SERPL-MCNC: 60 MG/DL (ref 8–23)
BUN/CREAT SERPL: 21.1 (ref 7–25)
CALCIUM SPEC-SCNC: 9.3 MG/DL (ref 8.6–10.5)
CHLORIDE SERPL-SCNC: 105 MMOL/L (ref 98–107)
CO2 SERPL-SCNC: 21.4 MMOL/L (ref 22–29)
CREAT SERPL-MCNC: 2.85 MG/DL (ref 0.57–1)
DEPRECATED RDW RBC AUTO: 52.5 FL (ref 37–54)
EOSINOPHIL # BLD AUTO: 0.04 10*3/MM3 (ref 0–0.4)
EOSINOPHIL NFR BLD AUTO: 0.5 % (ref 0.3–6.2)
ERYTHROCYTE [DISTWIDTH] IN BLOOD BY AUTOMATED COUNT: 16.3 % (ref 12.3–15.4)
GFR SERPL CREATININE-BSD FRML MDRD: 16 ML/MIN/1.73
GLUCOSE SERPL-MCNC: 93 MG/DL (ref 65–99)
HCT VFR BLD AUTO: 31.2 % (ref 34–46.6)
HGB BLD-MCNC: 10.2 G/DL (ref 12–15.9)
IMM GRANULOCYTES # BLD AUTO: 0.02 10*3/MM3 (ref 0–0.05)
IMM GRANULOCYTES NFR BLD AUTO: 0.3 % (ref 0–0.5)
INR PPP: 1.15 (ref 0.9–1.1)
LYMPHOCYTES # BLD AUTO: 1.03 10*3/MM3 (ref 0.7–3.1)
LYMPHOCYTES NFR BLD AUTO: 13.1 % (ref 19.6–45.3)
MCH RBC QN AUTO: 29.4 PG (ref 26.6–33)
MCHC RBC AUTO-ENTMCNC: 32.7 G/DL (ref 31.5–35.7)
MCV RBC AUTO: 89.9 FL (ref 79–97)
MONOCYTES # BLD AUTO: 0.4 10*3/MM3 (ref 0.1–0.9)
MONOCYTES NFR BLD AUTO: 5.1 % (ref 5–12)
NEUTROPHILS NFR BLD AUTO: 6.32 10*3/MM3 (ref 1.7–7)
NEUTROPHILS NFR BLD AUTO: 80.6 % (ref 42.7–76)
NRBC BLD AUTO-RTO: 0.1 /100 WBC (ref 0–0.2)
PLATELET # BLD AUTO: 188 10*3/MM3 (ref 140–450)
PMV BLD AUTO: 11.6 FL (ref 6–12)
POTASSIUM SERPL-SCNC: 4.2 MMOL/L (ref 3.5–5.2)
PROTHROMBIN TIME: 14.5 SECONDS (ref 11.7–14.2)
RBC # BLD AUTO: 3.47 10*6/MM3 (ref 3.77–5.28)
SODIUM SERPL-SCNC: 139 MMOL/L (ref 136–145)
WBC # BLD AUTO: 7.84 10*3/MM3 (ref 3.4–10.8)

## 2021-05-24 PROCEDURE — 85730 THROMBOPLASTIN TIME PARTIAL: CPT

## 2021-05-24 PROCEDURE — 85025 COMPLETE CBC W/AUTO DIFF WBC: CPT

## 2021-05-24 PROCEDURE — C9803 HOPD COVID-19 SPEC COLLECT: HCPCS

## 2021-05-24 PROCEDURE — 36415 COLL VENOUS BLD VENIPUNCTURE: CPT

## 2021-05-24 PROCEDURE — U0004 COV-19 TEST NON-CDC HGH THRU: HCPCS

## 2021-05-24 PROCEDURE — 85610 PROTHROMBIN TIME: CPT

## 2021-05-24 PROCEDURE — 80048 BASIC METABOLIC PNL TOTAL CA: CPT

## 2021-05-25 ENCOUNTER — TELEPHONE (OUTPATIENT)
Dept: CARDIOLOGY | Facility: CLINIC | Age: 76
End: 2021-05-25

## 2021-05-25 LAB — SARS-COV-2 ORF1AB RESP QL NAA+PROBE: NOT DETECTED

## 2021-05-25 NOTE — TELEPHONE ENCOUNTER
PATIENT SCHED FOR PCM TOMORROW -HER BP /62 HR 44   WANTS TO KNOW IF SHE SHOULD TAKE AN EXTRA 1/2 TAB OF METOPROLOL [phone]   NEEDS TO KNOW BEFORE TOMORROW

## 2021-05-26 ENCOUNTER — HOSPITAL ENCOUNTER (OUTPATIENT)
Facility: HOSPITAL | Age: 76
Discharge: HOME | End: 2021-05-27
Attending: INTERNAL MEDICINE | Admitting: INTERNAL MEDICINE

## 2021-05-26 ENCOUNTER — APPOINTMENT (OUTPATIENT)
Dept: GENERAL RADIOLOGY | Facility: HOSPITAL | Age: 76
End: 2021-05-26

## 2021-05-26 DIAGNOSIS — R00.1 BRADYCARDIA, SINUS: ICD-10-CM

## 2021-05-26 PROCEDURE — G0378 HOSPITAL OBSERVATION PER HR: HCPCS

## 2021-05-26 PROCEDURE — C1898 LEAD, PMKR, OTHER THAN TRANS: HCPCS | Performed by: INTERNAL MEDICINE

## 2021-05-26 PROCEDURE — C1894 INTRO/SHEATH, NON-LASER: HCPCS | Performed by: INTERNAL MEDICINE

## 2021-05-26 PROCEDURE — 25010000002 VANCOMYCIN PER 500 MG: Performed by: INTERNAL MEDICINE

## 2021-05-26 PROCEDURE — 25010000002 FENTANYL CITRATE (PF) 50 MCG/ML SOLUTION: Performed by: INTERNAL MEDICINE

## 2021-05-26 PROCEDURE — 71045 X-RAY EXAM CHEST 1 VIEW: CPT

## 2021-05-26 PROCEDURE — C1785 PMKR, DUAL, RATE-RESP: HCPCS | Performed by: INTERNAL MEDICINE

## 2021-05-26 PROCEDURE — 25010000002 DIPHENHYDRAMINE PER 50 MG: Performed by: INTERNAL MEDICINE

## 2021-05-26 PROCEDURE — 25010000003 LIDOCAINE 1 % SOLUTION: Performed by: INTERNAL MEDICINE

## 2021-05-26 PROCEDURE — 25010000003 CEFAZOLIN IN DEXTROSE 2-4 GM/100ML-% SOLUTION: Performed by: INTERNAL MEDICINE

## 2021-05-26 PROCEDURE — 25010000002 MIDAZOLAM PER 1 MG: Performed by: INTERNAL MEDICINE

## 2021-05-26 PROCEDURE — 25010000002 METHYLPREDNISOLONE PER 125 MG: Performed by: INTERNAL MEDICINE

## 2021-05-26 PROCEDURE — 0 IOPAMIDOL PER 1 ML: Performed by: INTERNAL MEDICINE

## 2021-05-26 PROCEDURE — 33208 INSRT HEART PM ATRIAL & VENT: CPT | Performed by: INTERNAL MEDICINE

## 2021-05-26 PROCEDURE — 99153 MOD SED SAME PHYS/QHP EA: CPT | Performed by: INTERNAL MEDICINE

## 2021-05-26 PROCEDURE — 99152 MOD SED SAME PHYS/QHP 5/>YRS: CPT | Performed by: INTERNAL MEDICINE

## 2021-05-26 DEVICE — IMPLANTABLE DEVICE: Type: IMPLANTABLE DEVICE | Status: FUNCTIONAL

## 2021-05-26 DEVICE — PACEMAKER
Type: IMPLANTABLE DEVICE | Status: FUNCTIONAL
Brand: ACCOLADE™ MRI DR

## 2021-05-26 DEVICE — PACE/SENSE LEAD
Type: IMPLANTABLE DEVICE | Status: FUNCTIONAL
Brand: INGEVITY™+

## 2021-05-26 RX ORDER — SODIUM CHLORIDE 0.9 % (FLUSH) 0.9 %
3 SYRINGE (ML) INJECTION EVERY 12 HOURS SCHEDULED
Status: DISCONTINUED | OUTPATIENT
Start: 2021-05-26 | End: 2021-05-26 | Stop reason: HOSPADM

## 2021-05-26 RX ORDER — SODIUM CHLORIDE 0.9 % (FLUSH) 0.9 %
3 SYRINGE (ML) INJECTION EVERY 12 HOURS SCHEDULED
Status: DISCONTINUED | OUTPATIENT
Start: 2021-05-26 | End: 2021-05-27 | Stop reason: HOSPADM

## 2021-05-26 RX ORDER — DIPHENHYDRAMINE HYDROCHLORIDE 50 MG/ML
INJECTION INTRAMUSCULAR; INTRAVENOUS AS NEEDED
Status: DISCONTINUED | OUTPATIENT
Start: 2021-05-26 | End: 2021-05-26 | Stop reason: HOSPADM

## 2021-05-26 RX ORDER — HYDRALAZINE HYDROCHLORIDE 20 MG/ML
INJECTION INTRAMUSCULAR; INTRAVENOUS
Status: DISPENSED
Start: 2021-05-26 | End: 2021-05-27

## 2021-05-26 RX ORDER — SODIUM CHLORIDE 9 MG/ML
75 INJECTION, SOLUTION INTRAVENOUS CONTINUOUS
Status: DISCONTINUED | OUTPATIENT
Start: 2021-05-26 | End: 2021-05-27 | Stop reason: HOSPADM

## 2021-05-26 RX ORDER — SODIUM CHLORIDE 0.9 % (FLUSH) 0.9 %
10 SYRINGE (ML) INJECTION AS NEEDED
Status: DISCONTINUED | OUTPATIENT
Start: 2021-05-26 | End: 2021-05-26 | Stop reason: HOSPADM

## 2021-05-26 RX ORDER — NITROGLYCERIN 0.4 MG/1
0.4 TABLET SUBLINGUAL
Status: DISCONTINUED | OUTPATIENT
Start: 2021-05-26 | End: 2021-05-27 | Stop reason: HOSPADM

## 2021-05-26 RX ORDER — CEFAZOLIN SODIUM 2 G/100ML
INJECTION, SOLUTION INTRAVENOUS CONTINUOUS PRN
Status: COMPLETED | OUTPATIENT
Start: 2021-05-26 | End: 2021-05-26

## 2021-05-26 RX ORDER — MIDAZOLAM HYDROCHLORIDE 1 MG/ML
INJECTION INTRAMUSCULAR; INTRAVENOUS AS NEEDED
Status: DISCONTINUED | OUTPATIENT
Start: 2021-05-26 | End: 2021-05-26 | Stop reason: HOSPADM

## 2021-05-26 RX ORDER — LIDOCAINE HYDROCHLORIDE 10 MG/ML
INJECTION, SOLUTION INFILTRATION; PERINEURAL AS NEEDED
Status: DISCONTINUED | OUTPATIENT
Start: 2021-05-26 | End: 2021-05-26 | Stop reason: HOSPADM

## 2021-05-26 RX ORDER — LABETALOL HYDROCHLORIDE 5 MG/ML
10 INJECTION, SOLUTION INTRAVENOUS EVERY 6 HOURS PRN
Status: DISCONTINUED | OUTPATIENT
Start: 2021-05-26 | End: 2021-05-26 | Stop reason: HOSPADM

## 2021-05-26 RX ORDER — METHYLPREDNISOLONE SODIUM SUCCINATE 125 MG/2ML
INJECTION, POWDER, LYOPHILIZED, FOR SOLUTION INTRAMUSCULAR; INTRAVENOUS AS NEEDED
Status: DISCONTINUED | OUTPATIENT
Start: 2021-05-26 | End: 2021-05-26 | Stop reason: HOSPADM

## 2021-05-26 RX ORDER — CEFAZOLIN SODIUM 2 G/100ML
2 INJECTION, SOLUTION INTRAVENOUS EVERY 8 HOURS
Status: COMPLETED | OUTPATIENT
Start: 2021-05-26 | End: 2021-05-27

## 2021-05-26 RX ORDER — FENTANYL CITRATE 50 UG/ML
INJECTION, SOLUTION INTRAMUSCULAR; INTRAVENOUS AS NEEDED
Status: DISCONTINUED | OUTPATIENT
Start: 2021-05-26 | End: 2021-05-26 | Stop reason: HOSPADM

## 2021-05-26 RX ORDER — SODIUM CHLORIDE 0.9 % (FLUSH) 0.9 %
10 SYRINGE (ML) INJECTION AS NEEDED
Status: DISCONTINUED | OUTPATIENT
Start: 2021-05-26 | End: 2021-05-27 | Stop reason: HOSPADM

## 2021-05-26 RX ORDER — VANCOMYCIN HYDROCHLORIDE 1 G/200ML
INJECTION, SOLUTION INTRAVENOUS CONTINUOUS PRN
Status: COMPLETED | OUTPATIENT
Start: 2021-05-26 | End: 2021-05-26

## 2021-05-26 RX ADMIN — LABETALOL HYDROCHLORIDE 10 MG: 5 INJECTION, SOLUTION INTRAVENOUS at 12:41

## 2021-05-26 RX ADMIN — SODIUM CHLORIDE 75 ML/HR: 9 INJECTION, SOLUTION INTRAVENOUS at 09:05

## 2021-05-26 RX ADMIN — CEFAZOLIN SODIUM 2 G: 2 INJECTION, SOLUTION INTRAVENOUS at 18:33

## 2021-05-26 NOTE — TELEPHONE ENCOUNTER
S/W PT ON 5/25. PER DR. TREJO, SHE WAS TO TAKE REGULAR DOSE ONLY, NO EXTRA.  SHE VERBALIZED UNDERSTANDING.

## 2021-05-27 VITALS
HEIGHT: 59 IN | OXYGEN SATURATION: 96 % | DIASTOLIC BLOOD PRESSURE: 81 MMHG | TEMPERATURE: 98.4 F | SYSTOLIC BLOOD PRESSURE: 177 MMHG | WEIGHT: 120 LBS | RESPIRATION RATE: 16 BRPM | HEART RATE: 99 BPM | BODY MASS INDEX: 24.19 KG/M2

## 2021-05-27 PROCEDURE — G0378 HOSPITAL OBSERVATION PER HR: HCPCS

## 2021-05-27 PROCEDURE — 99217 PR OBSERVATION CARE DISCHARGE MANAGEMENT: CPT | Performed by: NURSE PRACTITIONER

## 2021-05-27 PROCEDURE — 25010000003 CEFAZOLIN IN DEXTROSE 2-4 GM/100ML-% SOLUTION: Performed by: INTERNAL MEDICINE

## 2021-05-27 RX ORDER — ACETAMINOPHEN 325 MG/1
650 TABLET ORAL EVERY 6 HOURS PRN
Status: DISCONTINUED | OUTPATIENT
Start: 2021-05-27 | End: 2021-05-27 | Stop reason: HOSPADM

## 2021-05-27 RX ADMIN — CEFAZOLIN SODIUM 2 G: 2 INJECTION, SOLUTION INTRAVENOUS at 02:45

## 2021-05-27 RX ADMIN — ACETAMINOPHEN 325 MG: 325 TABLET, FILM COATED ORAL at 10:31

## 2021-05-27 RX ADMIN — Medication 3 ML: at 02:46

## 2021-05-27 RX ADMIN — Medication 3 ML: at 09:40

## 2021-06-10 ENCOUNTER — CLINICAL SUPPORT NO REQUIREMENTS (OUTPATIENT)
Dept: CARDIOLOGY | Facility: CLINIC | Age: 76
End: 2021-06-10

## 2021-06-10 ENCOUNTER — OFFICE VISIT (OUTPATIENT)
Dept: CARDIOLOGY | Facility: CLINIC | Age: 76
End: 2021-06-10

## 2021-06-10 VITALS
HEIGHT: 59 IN | DIASTOLIC BLOOD PRESSURE: 81 MMHG | WEIGHT: 113 LBS | BODY MASS INDEX: 22.78 KG/M2 | HEART RATE: 88 BPM | SYSTOLIC BLOOD PRESSURE: 145 MMHG

## 2021-06-10 DIAGNOSIS — E78.00 HYPERCHOLESTEROLEMIA: ICD-10-CM

## 2021-06-10 DIAGNOSIS — Z95.0 PACEMAKER: Primary | ICD-10-CM

## 2021-06-10 DIAGNOSIS — Z95.0 PACEMAKER: ICD-10-CM

## 2021-06-10 DIAGNOSIS — I10 ESSENTIAL HYPERTENSION: ICD-10-CM

## 2021-06-10 DIAGNOSIS — R07.2 PRECORDIAL PAIN: Primary | ICD-10-CM

## 2021-06-10 PROCEDURE — 93288 INTERROG EVL PM/LDLS PM IP: CPT | Performed by: INTERNAL MEDICINE

## 2021-06-10 PROCEDURE — 99024 POSTOP FOLLOW-UP VISIT: CPT | Performed by: INTERNAL MEDICINE

## 2021-06-10 RX ORDER — CEFDINIR 300 MG/1
300 CAPSULE ORAL 2 TIMES DAILY
COMMUNITY
Start: 2021-06-02 | End: 2021-12-23

## 2021-06-10 NOTE — PROGRESS NOTES
NEW PACEMAKER FOLLOW UP   Subjective:        Mary Boyle is a 75 y.o. female who here for follow up    CC  PACER  FEELS MUCH BETTER  HPI  75 years old female with a known history of the hypertension hypercholesterolemia pacemaker here for the follow-up feeling much much better no complaints of chest pains tightness heaviness or the pressure sensation     Problems Addressed this Visit        Cardiac and Vasculature    Chest pain - Primary    Essential hypertension    Hypercholesterolemia    Pacemaker      Diagnoses       Codes Comments    Precordial pain    -  Primary ICD-10-CM: R07.2  ICD-9-CM: 786.51     Pacemaker     ICD-10-CM: Z95.0  ICD-9-CM: V45.01     Essential hypertension     ICD-10-CM: I10  ICD-9-CM: 401.9     Hypercholesterolemia     ICD-10-CM: E78.00  ICD-9-CM: 272.0         .    The following portions of the patient's history were reviewed and updated as appropriate: allergies, current medications, past family history, past medical history, past social history, past surgical history and problem list.    Past Medical History:   Diagnosis Date   • Arthritis    • Asthma    • Chest pain    • Chronic kidney disease    • Depression    • Dizziness 11/11/2020    Went to ER with dizziness and generalized weakness--found to have hydrocephalus.    • Ganglion cyst     right leg   • GERD (gastroesophageal reflux disease)    • Gout    • Hiatal hernia    • Hypertension    • Hypokalemia    • Mild mitral regurgitation    • Normal pressure hydrocephalus (CMS/HCC)    • Personality disorder (CMS/HCC)     PER NOTE IN EPIC    • PONV (postoperative nausea and vomiting)    • Recovering alcoholic (CMS/HCC)     SOBER SINCE 2003   • Thyroid nodule    • Tricuspid regurgitation    • Vitamin D deficiency      reports that she quit smoking about 16 years ago. Her smoking use included cigarettes. She has a 40.00 pack-year smoking history. She has never used smokeless tobacco. She reports that she does not drink alcohol and does  "not use drugs.   Family History   Problem Relation Age of Onset   • Heart attack Other    • Heart disease Other    • Hypertension Father    • Stroke Father    • Malig Hyperthermia Neg Hx        Review of Systems  Constitutional: No wt loss, fever, fatigue  Gastrointestinal: No nausea, abdominal pain  Behavioral/Psych: No insomnia or anxiety   Cardiovascular no chest pains or tightness in the chest  Objective:       Physical Exam'  /81   Pulse 88   Ht 149.9 cm (59\")   Wt 51.3 kg (113 lb)   BMI 22.82 kg/m²   General appearance: No acute changes   Neck: Trachea midline; NECK, supple, no thyromegaly or lymphadenopathy   Lungs: Normal size and shape, normal breath sounds, equal distribution of air, no rales and rhonchi   CV: S1-S2 regular, no murmurs, no rub, no gallop   Abdomen: Soft, non-tender; no masses , no abnormal abdominal sounds   Extremities: No deformity , normal color , no peripheral edema   Skin: Normal temperature, turgor and texture; no rash, ulcers          Procedures      Echocardiogram:        Current Outpatient Medications:   •  acetaminophen (TYLENOL) 325 MG tablet, Take 2 tablets by mouth Every 4 (Four) Hours As Needed for Mild Pain . (Patient taking differently: Take 325 mg by mouth Every 4 (Four) Hours As Needed for Mild Pain .), Disp:  , Rfl:   •  albuterol (VENTOLIN HFA) 108 (90 BASE) MCG/ACT inhaler, 2 puffs Every 4 (Four) Hours As Needed., Disp: , Rfl:   •  allopurinol (ZYLOPRIM) 100 MG tablet, Take 1 tablet by mouth Daily., Disp: 90 tablet, Rfl: 3  •  Ascorbic Acid (Vitamin C) 500 MG chewable tablet, Chew Daily., Disp: , Rfl:   •  cefdinir (OMNICEF) 300 MG capsule, Take 300 mg by mouth 2 (Two) Times a Day., Disp: , Rfl:   •  Cholecalciferol (Vitamin D-3) 25 MCG (1000 UT) capsule, Take 1,000 Units by mouth Daily., Disp: , Rfl:   •  esomeprazole (nexIUM) 20 MG capsule, Take 20 mg by mouth Every Morning Before Breakfast., Disp: , Rfl:   •  fexofenadine (ALLEGRA) 60 MG tablet, Take 60 mg " by mouth Daily., Disp: , Rfl:   •  furosemide (LASIX) 20 MG tablet, Take 20 mg by mouth Daily., Disp: , Rfl:   •  hydrALAZINE (APRESOLINE) 50 MG tablet, Take 50 mg by mouth 3 (Three) Times a Day., Disp: , Rfl:   •  ipratropium-albuterol (DUO-NEB) 0.5-2.5 mg/3 ml nebulizer, , Disp: , Rfl:   •  MAGNESIUM PO, Take 20 mg by mouth Daily., Disp: , Rfl:   •  metoprolol succinate XL (TOPROL-XL) 25 MG 24 hr tablet, Take 1 tablet by mouth Daily., Disp: 30 tablet, Rfl: 11  •  Probiotic Product (HEALTHY COLON PO), Take 1 tablet/day by mouth., Disp: , Rfl:    Assessment:        Patient Active Problem List   Diagnosis   • Chest pain   • Colon polyp   • COPD (chronic obstructive pulmonary disease) (CMS/[facility])   • Diverticulosis   • Essential hypertension   • GERD without esophagitis   • Hypercholesterolemia   • Osteopenia   • Multiple thyroid nodules   • Chronic fatigue   • Prediabetes   • Vitamin D deficiency   • Renal insufficiency   • Leg swelling   • Hyperuricemia   • SOB (shortness of breath)   • Generalized weakness   • NPH (normal pressure hydrocephalus) (CMS/[facility])   • Bradycardia, sinus               Plan:            ICD-10-CM ICD-9-CM   1. Precordial pain  R07.2 786.51   2. Pacemaker  Z95.0 V45.01   3. Essential hypertension  I10 401.9   4. Hypercholesterolemia  E78.00 272.0     1. Precordial pain  Atypical    2. Pacemaker  Pacemaker functioning normally    3. Essential hypertension  Pressure under control    4. Hypercholesterolemia  Continue current treatment       SEE IN 6 MONTHS  COUNSELING:    Mary Segura was given to patient for the following topics: diagnostic results, risk factor reductions, impressions, risks and benefits of treatment options and importance of treatment compliance .       SMOKING COUNSELING:    [unfilled]    Dictated using Dragon dictation

## 2021-10-05 ENCOUNTER — TELEPHONE (OUTPATIENT)
Dept: CARDIOLOGY | Facility: CLINIC | Age: 76
End: 2021-10-05

## 2021-10-05 NOTE — TELEPHONE ENCOUNTER
----- Message from Nancy Austin sent at 9/29/2021  1:50 PM EDT -----  Regarding: PACEMAKER BOX WENT OFF YESTERDAY IF THIS HAPPENS AGAIN WHAT SHOULD SHE DO?  Contact: [phone]

## 2021-12-23 ENCOUNTER — CLINICAL SUPPORT NO REQUIREMENTS (OUTPATIENT)
Dept: CARDIOLOGY | Facility: CLINIC | Age: 76
End: 2021-12-23

## 2021-12-23 ENCOUNTER — OFFICE VISIT (OUTPATIENT)
Dept: CARDIOLOGY | Facility: CLINIC | Age: 76
End: 2021-12-23

## 2021-12-23 VITALS
HEART RATE: 84 BPM | BODY MASS INDEX: 24.6 KG/M2 | DIASTOLIC BLOOD PRESSURE: 71 MMHG | WEIGHT: 122 LBS | HEIGHT: 59 IN | SYSTOLIC BLOOD PRESSURE: 176 MMHG

## 2021-12-23 DIAGNOSIS — E78.00 HYPERCHOLESTEROLEMIA: ICD-10-CM

## 2021-12-23 DIAGNOSIS — R07.2 PRECORDIAL PAIN: Primary | ICD-10-CM

## 2021-12-23 DIAGNOSIS — Z95.0 PACEMAKER: ICD-10-CM

## 2021-12-23 DIAGNOSIS — Z95.0 PACEMAKER: Primary | ICD-10-CM

## 2021-12-23 DIAGNOSIS — I10 ESSENTIAL HYPERTENSION: ICD-10-CM

## 2021-12-23 PROCEDURE — 99213 OFFICE O/P EST LOW 20 MIN: CPT | Performed by: INTERNAL MEDICINE

## 2021-12-23 PROCEDURE — 93280 PM DEVICE PROGR EVAL DUAL: CPT | Performed by: INTERNAL MEDICINE

## 2022-03-08 PROCEDURE — 93294 REM INTERROG EVL PM/LDLS PM: CPT | Performed by: INTERNAL MEDICINE

## 2022-03-08 PROCEDURE — 93296 REM INTERROG EVL PM/IDS: CPT | Performed by: INTERNAL MEDICINE

## 2022-04-26 PROCEDURE — 99284 EMERGENCY DEPT VISIT MOD MDM: CPT

## 2022-04-27 ENCOUNTER — HOSPITAL ENCOUNTER (OUTPATIENT)
Facility: HOSPITAL | Age: 77
Setting detail: OBSERVATION
Discharge: HOME | End: 2022-04-29
Attending: EMERGENCY MEDICINE | Admitting: HOSPITALIST

## 2022-04-27 ENCOUNTER — APPOINTMENT (OUTPATIENT)
Dept: GENERAL RADIOLOGY | Facility: HOSPITAL | Age: 77
End: 2022-04-27

## 2022-04-27 ENCOUNTER — APPOINTMENT (OUTPATIENT)
Dept: CT IMAGING | Facility: HOSPITAL | Age: 77
End: 2022-04-27

## 2022-04-27 DIAGNOSIS — R10.9 ABDOMINAL PAIN, UNSPECIFIED ABDOMINAL LOCATION: Primary | ICD-10-CM

## 2022-04-27 DIAGNOSIS — N18.4 CKD (CHRONIC KIDNEY DISEASE), STAGE IV: ICD-10-CM

## 2022-04-27 DIAGNOSIS — R00.2 PALPITATIONS: ICD-10-CM

## 2022-04-27 DIAGNOSIS — R07.9 CHEST PAIN, UNSPECIFIED TYPE: ICD-10-CM

## 2022-04-27 DIAGNOSIS — I10 HYPERTENSION, UNSPECIFIED TYPE: ICD-10-CM

## 2022-04-27 PROBLEM — N18.32 STAGE 3B CHRONIC KIDNEY DISEASE: Status: ACTIVE | Noted: 2022-04-27

## 2022-04-27 PROBLEM — R73.9 HYPERGLYCEMIA: Status: ACTIVE | Noted: 2022-04-27

## 2022-04-27 PROBLEM — R19.7 DIARRHEA: Status: ACTIVE | Noted: 2022-04-27

## 2022-04-27 LAB
ALBUMIN SERPL-MCNC: 3.7 G/DL (ref 3.5–5.2)
ALBUMIN/GLOB SERPL: 1.2 G/DL
ALP SERPL-CCNC: 103 U/L (ref 39–117)
ALT SERPL W P-5'-P-CCNC: 15 U/L (ref 1–33)
ANION GAP SERPL CALCULATED.3IONS-SCNC: 13 MMOL/L (ref 5–15)
ANION GAP SERPL CALCULATED.3IONS-SCNC: 14.6 MMOL/L (ref 5–15)
AST SERPL-CCNC: 34 U/L (ref 1–32)
BASOPHILS # BLD AUTO: 0.03 10*3/MM3 (ref 0–0.2)
BASOPHILS NFR BLD AUTO: 0.3 % (ref 0–1.5)
BILIRUB SERPL-MCNC: 0.3 MG/DL (ref 0–1.2)
BUN SERPL-MCNC: 28 MG/DL (ref 8–23)
BUN SERPL-MCNC: 30 MG/DL (ref 8–23)
BUN/CREAT SERPL: 16.2 (ref 7–25)
BUN/CREAT SERPL: 18.3 (ref 7–25)
CALCIUM SPEC-SCNC: 9 MG/DL (ref 8.6–10.5)
CALCIUM SPEC-SCNC: 9.1 MG/DL (ref 8.6–10.5)
CHLORIDE SERPL-SCNC: 102 MMOL/L (ref 98–107)
CHLORIDE SERPL-SCNC: 99 MMOL/L (ref 98–107)
CO2 SERPL-SCNC: 22.4 MMOL/L (ref 22–29)
CO2 SERPL-SCNC: 25 MMOL/L (ref 22–29)
CREAT SERPL-MCNC: 1.53 MG/DL (ref 0.57–1)
CREAT SERPL-MCNC: 1.85 MG/DL (ref 0.57–1)
DEPRECATED RDW RBC AUTO: 48 FL (ref 37–54)
DEPRECATED RDW RBC AUTO: 50.3 FL (ref 37–54)
EGFRCR SERPLBLD CKD-EPI 2021: 27.8 ML/MIN/1.73
EGFRCR SERPLBLD CKD-EPI 2021: 34.9 ML/MIN/1.73
EOSINOPHIL # BLD AUTO: 0.17 10*3/MM3 (ref 0–0.4)
EOSINOPHIL NFR BLD AUTO: 1.6 % (ref 0.3–6.2)
ERYTHROCYTE [DISTWIDTH] IN BLOOD BY AUTOMATED COUNT: 15.3 % (ref 12.3–15.4)
ERYTHROCYTE [DISTWIDTH] IN BLOOD BY AUTOMATED COUNT: 15.7 % (ref 12.3–15.4)
GLOBULIN UR ELPH-MCNC: 3.1 GM/DL
GLUCOSE BLDC GLUCOMTR-MCNC: 107 MG/DL (ref 70–130)
GLUCOSE BLDC GLUCOMTR-MCNC: 135 MG/DL (ref 70–130)
GLUCOSE BLDC GLUCOMTR-MCNC: 92 MG/DL (ref 70–130)
GLUCOSE SERPL-MCNC: 226 MG/DL (ref 65–99)
GLUCOSE SERPL-MCNC: 92 MG/DL (ref 65–99)
HCT VFR BLD AUTO: 36 % (ref 34–46.6)
HCT VFR BLD AUTO: 37 % (ref 34–46.6)
HGB BLD-MCNC: 12.2 G/DL (ref 12–15.9)
HGB BLD-MCNC: 12.3 G/DL (ref 12–15.9)
HOLD SPECIMEN: NORMAL
IMM GRANULOCYTES # BLD AUTO: 0.04 10*3/MM3 (ref 0–0.05)
IMM GRANULOCYTES NFR BLD AUTO: 0.4 % (ref 0–0.5)
LIPASE SERPL-CCNC: 40 U/L (ref 13–60)
LYMPHOCYTES # BLD AUTO: 1.12 10*3/MM3 (ref 0.7–3.1)
LYMPHOCYTES NFR BLD AUTO: 10.5 % (ref 19.6–45.3)
MAGNESIUM SERPL-MCNC: 2.2 MG/DL (ref 1.6–2.4)
MCH RBC QN AUTO: 29.2 PG (ref 26.6–33)
MCH RBC QN AUTO: 29.7 PG (ref 26.6–33)
MCHC RBC AUTO-ENTMCNC: 33 G/DL (ref 31.5–35.7)
MCHC RBC AUTO-ENTMCNC: 34.2 G/DL (ref 31.5–35.7)
MCV RBC AUTO: 87 FL (ref 79–97)
MCV RBC AUTO: 88.5 FL (ref 79–97)
MONOCYTES # BLD AUTO: 0.54 10*3/MM3 (ref 0.1–0.9)
MONOCYTES NFR BLD AUTO: 5 % (ref 5–12)
NEUTROPHILS NFR BLD AUTO: 8.81 10*3/MM3 (ref 1.7–7)
NEUTROPHILS NFR BLD AUTO: 82.2 % (ref 42.7–76)
NRBC BLD AUTO-RTO: 0 /100 WBC (ref 0–0.2)
NT-PROBNP SERPL-MCNC: 2907 PG/ML (ref 0–1800)
PLATELET # BLD AUTO: 214 10*3/MM3 (ref 140–450)
PLATELET # BLD AUTO: 231 10*3/MM3 (ref 140–450)
PMV BLD AUTO: 10.3 FL (ref 6–12)
PMV BLD AUTO: 9.8 FL (ref 6–12)
POTASSIUM SERPL-SCNC: 3.5 MMOL/L (ref 3.5–5.2)
POTASSIUM SERPL-SCNC: 4.1 MMOL/L (ref 3.5–5.2)
PROT SERPL-MCNC: 6.8 G/DL (ref 6–8.5)
QT INTERVAL: 407 MS
RBC # BLD AUTO: 4.14 10*6/MM3 (ref 3.77–5.28)
RBC # BLD AUTO: 4.18 10*6/MM3 (ref 3.77–5.28)
SARS-COV-2 RNA RESP QL NAA+PROBE: NOT DETECTED
SODIUM SERPL-SCNC: 136 MMOL/L (ref 136–145)
SODIUM SERPL-SCNC: 140 MMOL/L (ref 136–145)
TROPONIN T SERPL-MCNC: 0.03 NG/ML (ref 0–0.03)
TROPONIN T SERPL-MCNC: <0.01 NG/ML (ref 0–0.03)
TSH SERPL DL<=0.05 MIU/L-ACNC: 2.32 UIU/ML (ref 0.27–4.2)
WBC NRBC COR # BLD: 10.71 10*3/MM3 (ref 3.4–10.8)
WBC NRBC COR # BLD: 9.44 10*3/MM3 (ref 3.4–10.8)
WHOLE BLOOD HOLD SPECIMEN: NORMAL
WHOLE BLOOD HOLD SPECIMEN: NORMAL

## 2022-04-27 PROCEDURE — 97162 PT EVAL MOD COMPLEX 30 MIN: CPT

## 2022-04-27 PROCEDURE — 97530 THERAPEUTIC ACTIVITIES: CPT

## 2022-04-27 PROCEDURE — 71045 X-RAY EXAM CHEST 1 VIEW: CPT

## 2022-04-27 PROCEDURE — 93010 ELECTROCARDIOGRAM REPORT: CPT | Performed by: INTERNAL MEDICINE

## 2022-04-27 PROCEDURE — 85025 COMPLETE CBC W/AUTO DIFF WBC: CPT | Performed by: PHYSICIAN ASSISTANT

## 2022-04-27 PROCEDURE — 93005 ELECTROCARDIOGRAM TRACING: CPT | Performed by: EMERGENCY MEDICINE

## 2022-04-27 PROCEDURE — 83735 ASSAY OF MAGNESIUM: CPT | Performed by: INTERNAL MEDICINE

## 2022-04-27 PROCEDURE — 83690 ASSAY OF LIPASE: CPT | Performed by: PHYSICIAN ASSISTANT

## 2022-04-27 PROCEDURE — U0003 INFECTIOUS AGENT DETECTION BY NUCLEIC ACID (DNA OR RNA); SEVERE ACUTE RESPIRATORY SYNDROME CORONAVIRUS 2 (SARS-COV-2) (CORONAVIRUS DISEASE [COVID-19]), AMPLIFIED PROBE TECHNIQUE, MAKING USE OF HIGH THROUGHPUT TECHNOLOGIES AS DESCRIBED BY CMS-2020-01-R: HCPCS | Performed by: PHYSICIAN ASSISTANT

## 2022-04-27 PROCEDURE — G0378 HOSPITAL OBSERVATION PER HR: HCPCS

## 2022-04-27 PROCEDURE — 84484 ASSAY OF TROPONIN QUANT: CPT | Performed by: NURSE PRACTITIONER

## 2022-04-27 PROCEDURE — 83880 ASSAY OF NATRIURETIC PEPTIDE: CPT | Performed by: INTERNAL MEDICINE

## 2022-04-27 PROCEDURE — 25010000002 ONDANSETRON PER 1 MG: Performed by: PHYSICIAN ASSISTANT

## 2022-04-27 PROCEDURE — 84484 ASSAY OF TROPONIN QUANT: CPT | Performed by: PHYSICIAN ASSISTANT

## 2022-04-27 PROCEDURE — 96374 THER/PROPH/DIAG INJ IV PUSH: CPT

## 2022-04-27 PROCEDURE — 82962 GLUCOSE BLOOD TEST: CPT

## 2022-04-27 PROCEDURE — 25010000002 FUROSEMIDE PER 20 MG: Performed by: PHYSICIAN ASSISTANT

## 2022-04-27 PROCEDURE — 84443 ASSAY THYROID STIM HORMONE: CPT | Performed by: INTERNAL MEDICINE

## 2022-04-27 PROCEDURE — 93005 ELECTROCARDIOGRAM TRACING: CPT

## 2022-04-27 PROCEDURE — 85027 COMPLETE CBC AUTOMATED: CPT

## 2022-04-27 PROCEDURE — 99214 OFFICE O/P EST MOD 30 MIN: CPT | Performed by: INTERNAL MEDICINE

## 2022-04-27 PROCEDURE — 74176 CT ABD & PELVIS W/O CONTRAST: CPT

## 2022-04-27 PROCEDURE — C9803 HOPD COVID-19 SPEC COLLECT: HCPCS

## 2022-04-27 PROCEDURE — 80053 COMPREHEN METABOLIC PANEL: CPT | Performed by: PHYSICIAN ASSISTANT

## 2022-04-27 PROCEDURE — 96375 TX/PRO/DX INJ NEW DRUG ADDON: CPT

## 2022-04-27 RX ORDER — HYDRALAZINE HYDROCHLORIDE 25 MG/1
25 TABLET, FILM COATED ORAL 3 TIMES DAILY
Status: DISCONTINUED | OUTPATIENT
Start: 2022-04-27 | End: 2022-04-29 | Stop reason: HOSPADM

## 2022-04-27 RX ORDER — PANTOPRAZOLE SODIUM 40 MG/1
40 TABLET, DELAYED RELEASE ORAL EVERY MORNING
Status: DISCONTINUED | OUTPATIENT
Start: 2022-04-27 | End: 2022-04-29 | Stop reason: HOSPADM

## 2022-04-27 RX ORDER — ONDANSETRON 4 MG/1
4 TABLET, FILM COATED ORAL EVERY 6 HOURS PRN
Status: DISCONTINUED | OUTPATIENT
Start: 2022-04-27 | End: 2022-04-29 | Stop reason: HOSPADM

## 2022-04-27 RX ORDER — IPRATROPIUM BROMIDE AND ALBUTEROL SULFATE 2.5; .5 MG/3ML; MG/3ML
3 SOLUTION RESPIRATORY (INHALATION) EVERY 6 HOURS PRN
Status: DISCONTINUED | OUTPATIENT
Start: 2022-04-27 | End: 2022-04-28

## 2022-04-27 RX ORDER — ONDANSETRON 2 MG/ML
4 INJECTION INTRAMUSCULAR; INTRAVENOUS ONCE
Status: COMPLETED | OUTPATIENT
Start: 2022-04-27 | End: 2022-04-27

## 2022-04-27 RX ORDER — ACETAMINOPHEN 325 MG/1
650 TABLET ORAL EVERY 4 HOURS PRN
Status: DISCONTINUED | OUTPATIENT
Start: 2022-04-27 | End: 2022-04-29 | Stop reason: HOSPADM

## 2022-04-27 RX ORDER — NICOTINE POLACRILEX 4 MG
15 LOZENGE BUCCAL
Status: DISCONTINUED | OUTPATIENT
Start: 2022-04-27 | End: 2022-04-29 | Stop reason: HOSPADM

## 2022-04-27 RX ORDER — FUROSEMIDE 10 MG/ML
40 INJECTION INTRAMUSCULAR; INTRAVENOUS ONCE
Status: COMPLETED | OUTPATIENT
Start: 2022-04-27 | End: 2022-04-27

## 2022-04-27 RX ORDER — METOPROLOL SUCCINATE 25 MG/1
25 TABLET, EXTENDED RELEASE ORAL DAILY
Status: DISCONTINUED | OUTPATIENT
Start: 2022-04-27 | End: 2022-04-27

## 2022-04-27 RX ORDER — METOPROLOL SUCCINATE 50 MG/1
50 TABLET, EXTENDED RELEASE ORAL NIGHTLY
Status: DISCONTINUED | OUTPATIENT
Start: 2022-04-27 | End: 2022-04-29 | Stop reason: HOSPADM

## 2022-04-27 RX ORDER — UREA 10 %
3 LOTION (ML) TOPICAL NIGHTLY PRN
Status: DISCONTINUED | OUTPATIENT
Start: 2022-04-27 | End: 2022-04-29 | Stop reason: HOSPADM

## 2022-04-27 RX ORDER — NITROGLYCERIN 0.4 MG/1
0.4 TABLET SUBLINGUAL
Status: DISCONTINUED | OUTPATIENT
Start: 2022-04-27 | End: 2022-04-29 | Stop reason: HOSPADM

## 2022-04-27 RX ORDER — HYDRALAZINE HYDROCHLORIDE 50 MG/1
50 TABLET, FILM COATED ORAL 3 TIMES DAILY
Status: DISCONTINUED | OUTPATIENT
Start: 2022-04-27 | End: 2022-04-27

## 2022-04-27 RX ORDER — FUROSEMIDE 20 MG/1
20 TABLET ORAL DAILY
Status: DISCONTINUED | OUTPATIENT
Start: 2022-04-27 | End: 2022-04-28

## 2022-04-27 RX ORDER — INSULIN LISPRO 100 [IU]/ML
0-7 INJECTION, SOLUTION INTRAVENOUS; SUBCUTANEOUS
Status: DISCONTINUED | OUTPATIENT
Start: 2022-04-27 | End: 2022-04-29 | Stop reason: HOSPADM

## 2022-04-27 RX ORDER — ONDANSETRON 2 MG/ML
4 INJECTION INTRAMUSCULAR; INTRAVENOUS EVERY 6 HOURS PRN
Status: DISCONTINUED | OUTPATIENT
Start: 2022-04-27 | End: 2022-04-29 | Stop reason: HOSPADM

## 2022-04-27 RX ORDER — DEXTROSE MONOHYDRATE 25 G/50ML
25 INJECTION, SOLUTION INTRAVENOUS
Status: DISCONTINUED | OUTPATIENT
Start: 2022-04-27 | End: 2022-04-29 | Stop reason: HOSPADM

## 2022-04-27 RX ORDER — AMLODIPINE BESYLATE 5 MG/1
5 TABLET ORAL
Status: DISCONTINUED | OUTPATIENT
Start: 2022-04-27 | End: 2022-04-29 | Stop reason: HOSPADM

## 2022-04-27 RX ADMIN — HYDRALAZINE HYDROCHLORIDE 25 MG: 50 TABLET, FILM COATED ORAL at 20:51

## 2022-04-27 RX ADMIN — METOPROLOL SUCCINATE 50 MG: 50 TABLET, EXTENDED RELEASE ORAL at 20:51

## 2022-04-27 RX ADMIN — PANTOPRAZOLE SODIUM 40 MG: 40 TABLET, DELAYED RELEASE ORAL at 11:33

## 2022-04-27 RX ADMIN — FUROSEMIDE 40 MG: 10 INJECTION, SOLUTION INTRAMUSCULAR; INTRAVENOUS at 05:09

## 2022-04-27 RX ADMIN — ACETAMINOPHEN 650 MG: 325 TABLET ORAL at 18:59

## 2022-04-27 RX ADMIN — FUROSEMIDE 20 MG: 20 TABLET ORAL at 11:33

## 2022-04-27 RX ADMIN — HYDRALAZINE HYDROCHLORIDE 50 MG: 50 TABLET, FILM COATED ORAL at 11:33

## 2022-04-27 RX ADMIN — ONDANSETRON 4 MG: 2 INJECTION INTRAMUSCULAR; INTRAVENOUS at 01:11

## 2022-04-27 RX ADMIN — METOPROLOL SUCCINATE 25 MG: 25 TABLET, EXTENDED RELEASE ORAL at 11:33

## 2022-04-27 RX ADMIN — HYDRALAZINE HYDROCHLORIDE 25 MG: 50 TABLET, FILM COATED ORAL at 17:12

## 2022-04-27 RX ADMIN — AMLODIPINE BESYLATE 5 MG: 5 TABLET ORAL at 14:24

## 2022-04-28 ENCOUNTER — APPOINTMENT (OUTPATIENT)
Dept: CARDIOLOGY | Facility: HOSPITAL | Age: 77
End: 2022-04-28

## 2022-04-28 LAB
ANION GAP SERPL CALCULATED.3IONS-SCNC: 10.9 MMOL/L (ref 5–15)
AORTIC DIMENSIONLESS INDEX: 0.6 (DI)
BH CV ECHO MEAS - AO MAX PG: 8 MMHG
BH CV ECHO MEAS - AO MEAN PG: 4.9 MMHG
BH CV ECHO MEAS - AO ROOT DIAM: 2.7 CM
BH CV ECHO MEAS - AO V2 MAX: 141.6 CM/SEC
BH CV ECHO MEAS - AO V2 VTI: 33.1 CM
BH CV ECHO MEAS - AVA(I,D): 1.41 CM2
BH CV ECHO MEAS - EDV(CUBED): 123.9 ML
BH CV ECHO MEAS - EDV(MOD-SP2): 73 ML
BH CV ECHO MEAS - EDV(MOD-SP4): 82 ML
BH CV ECHO MEAS - EF(MOD-BP): 40 %
BH CV ECHO MEAS - EF(MOD-SP2): 54.8 %
BH CV ECHO MEAS - EF(MOD-SP4): 51.2 %
BH CV ECHO MEAS - ESV(CUBED): 32.9 ML
BH CV ECHO MEAS - ESV(MOD-SP2): 33 ML
BH CV ECHO MEAS - ESV(MOD-SP4): 40 ML
BH CV ECHO MEAS - FS: 35.7 %
BH CV ECHO MEAS - IVS/LVPW: 1.15 CM
BH CV ECHO MEAS - IVSD: 1.12 CM
BH CV ECHO MEAS - LAT PEAK E' VEL: 5.4 CM/SEC
BH CV ECHO MEAS - LV DIASTOLIC VOL/BSA (35-75): 54.3 CM2
BH CV ECHO MEAS - LV MASS(C)D: 193 GRAMS
BH CV ECHO MEAS - LV MAX PG: 3.5 MMHG
BH CV ECHO MEAS - LV MEAN PG: 1.8 MMHG
BH CV ECHO MEAS - LV SYSTOLIC VOL/BSA (12-30): 26.5 CM2
BH CV ECHO MEAS - LV V1 MAX: 93 CM/SEC
BH CV ECHO MEAS - LV V1 VTI: 18.3 CM
BH CV ECHO MEAS - LVIDD: 5 CM
BH CV ECHO MEAS - LVIDS: 3.2 CM
BH CV ECHO MEAS - LVOT AREA: 2.6 CM2
BH CV ECHO MEAS - LVOT DIAM: 1.81 CM
BH CV ECHO MEAS - LVPWD: 0.97 CM
BH CV ECHO MEAS - MED PEAK E' VEL: 4.7 CM/SEC
BH CV ECHO MEAS - MR MAX PG: 104.4 MMHG
BH CV ECHO MEAS - MR MAX VEL: 510.9 CM/SEC
BH CV ECHO MEAS - MV A DUR: 0.1 SEC
BH CV ECHO MEAS - MV A MAX VEL: 150 CM/SEC
BH CV ECHO MEAS - MV DEC SLOPE: 421 CM/SEC2
BH CV ECHO MEAS - MV DEC TIME: 189 MSEC
BH CV ECHO MEAS - MV E MAX VEL: 91.8 CM/SEC
BH CV ECHO MEAS - MV E/A: 0.61
BH CV ECHO MEAS - MV MAX PG: 8.8 MMHG
BH CV ECHO MEAS - MV MEAN PG: 3.5 MMHG
BH CV ECHO MEAS - MV V2 VTI: 33.8 CM
BH CV ECHO MEAS - MVA(VTI): 1.39 CM2
BH CV ECHO MEAS - PA ACC TIME: 0.13 SEC
BH CV ECHO MEAS - PA PR(ACCEL): 22 MMHG
BH CV ECHO MEAS - PA V2 MAX: 82.8 CM/SEC
BH CV ECHO MEAS - RAP SYSTOLE: 8 MMHG
BH CV ECHO MEAS - RV MAX PG: 1.02 MMHG
BH CV ECHO MEAS - RV V1 MAX: 50.6 CM/SEC
BH CV ECHO MEAS - RV V1 VTI: 11.1 CM
BH CV ECHO MEAS - RVOT DIAM: 2.21 CM
BH CV ECHO MEAS - RVSP: 29.6 MMHG
BH CV ECHO MEAS - SI(MOD-SP2): 26.5 ML/M2
BH CV ECHO MEAS - SI(MOD-SP4): 27.8 ML/M2
BH CV ECHO MEAS - SV(LVOT): 46.9 ML
BH CV ECHO MEAS - SV(MOD-SP2): 40 ML
BH CV ECHO MEAS - SV(MOD-SP4): 42 ML
BH CV ECHO MEAS - SV(RVOT): 42.4 ML
BH CV ECHO MEAS - TAPSE (>1.6): 1.2 CM
BH CV ECHO MEAS - TR MAX PG: 21.6 MMHG
BH CV ECHO MEAS - TR MAX VEL: 232.2 CM/SEC
BH CV ECHO MEASUREMENTS AVERAGE E/E' RATIO: 18.18
BH CV XLRA - RV BASE: 3.2 CM
BH CV XLRA - RV LENGTH: 6.4 CM
BH CV XLRA - RV MID: 2.1 CM
BH CV XLRA - TDI S': 6.6 CM/SEC
BUN SERPL-MCNC: 31 MG/DL (ref 8–23)
BUN/CREAT SERPL: 14.9 (ref 7–25)
CALCIUM SPEC-SCNC: 8.8 MG/DL (ref 8.6–10.5)
CHLORIDE SERPL-SCNC: 100 MMOL/L (ref 98–107)
CO2 SERPL-SCNC: 28.1 MMOL/L (ref 22–29)
CREAT SERPL-MCNC: 2.08 MG/DL (ref 0.57–1)
DEPRECATED RDW RBC AUTO: 46.1 FL (ref 37–54)
EGFRCR SERPLBLD CKD-EPI 2021: 24.1 ML/MIN/1.73
ERYTHROCYTE [DISTWIDTH] IN BLOOD BY AUTOMATED COUNT: 15.1 % (ref 12.3–15.4)
GLUCOSE BLDC GLUCOMTR-MCNC: 114 MG/DL (ref 70–130)
GLUCOSE BLDC GLUCOMTR-MCNC: 125 MG/DL (ref 70–130)
GLUCOSE BLDC GLUCOMTR-MCNC: 129 MG/DL (ref 70–130)
GLUCOSE BLDC GLUCOMTR-MCNC: 94 MG/DL (ref 70–130)
GLUCOSE SERPL-MCNC: 88 MG/DL (ref 65–99)
HBA1C MFR BLD: 5.2 % (ref 4.8–5.6)
HCT VFR BLD AUTO: 33 % (ref 34–46.6)
HGB BLD-MCNC: 11.3 G/DL (ref 12–15.9)
LEFT ATRIUM VOLUME INDEX: 29.9 ML/M2
MAXIMAL PREDICTED HEART RATE: 143 BPM
MCH RBC QN AUTO: 29.4 PG (ref 26.6–33)
MCHC RBC AUTO-ENTMCNC: 34.2 G/DL (ref 31.5–35.7)
MCV RBC AUTO: 85.7 FL (ref 79–97)
PLATELET # BLD AUTO: 205 10*3/MM3 (ref 140–450)
PMV BLD AUTO: 10.4 FL (ref 6–12)
POTASSIUM SERPL-SCNC: 3.3 MMOL/L (ref 3.5–5.2)
RBC # BLD AUTO: 3.85 10*6/MM3 (ref 3.77–5.28)
SODIUM SERPL-SCNC: 139 MMOL/L (ref 136–145)
STRESS TARGET HR: 122 BPM
WBC NRBC COR # BLD: 6.73 10*3/MM3 (ref 3.4–10.8)

## 2022-04-28 PROCEDURE — 80048 BASIC METABOLIC PNL TOTAL CA: CPT | Performed by: NURSE PRACTITIONER

## 2022-04-28 PROCEDURE — 94799 UNLISTED PULMONARY SVC/PX: CPT

## 2022-04-28 PROCEDURE — 97116 GAIT TRAINING THERAPY: CPT

## 2022-04-28 PROCEDURE — G0378 HOSPITAL OBSERVATION PER HR: HCPCS

## 2022-04-28 PROCEDURE — 93306 TTE W/DOPPLER COMPLETE: CPT | Performed by: INTERNAL MEDICINE

## 2022-04-28 PROCEDURE — 99214 OFFICE O/P EST MOD 30 MIN: CPT | Performed by: NURSE PRACTITIONER

## 2022-04-28 PROCEDURE — 94664 DEMO&/EVAL PT USE INHALER: CPT

## 2022-04-28 PROCEDURE — 85027 COMPLETE CBC AUTOMATED: CPT | Performed by: NURSE PRACTITIONER

## 2022-04-28 PROCEDURE — 83036 HEMOGLOBIN GLYCOSYLATED A1C: CPT | Performed by: NURSE PRACTITIONER

## 2022-04-28 PROCEDURE — 97110 THERAPEUTIC EXERCISES: CPT

## 2022-04-28 PROCEDURE — 82962 GLUCOSE BLOOD TEST: CPT

## 2022-04-28 PROCEDURE — 94640 AIRWAY INHALATION TREATMENT: CPT

## 2022-04-28 PROCEDURE — 93306 TTE W/DOPPLER COMPLETE: CPT

## 2022-04-28 PROCEDURE — 94761 N-INVAS EAR/PLS OXIMETRY MLT: CPT

## 2022-04-28 RX ORDER — POTASSIUM CHLORIDE 1.5 G/1.77G
40 POWDER, FOR SOLUTION ORAL ONCE
Status: COMPLETED | OUTPATIENT
Start: 2022-04-28 | End: 2022-04-28

## 2022-04-28 RX ORDER — IPRATROPIUM BROMIDE AND ALBUTEROL SULFATE 2.5; .5 MG/3ML; MG/3ML
3 SOLUTION RESPIRATORY (INHALATION)
Status: DISCONTINUED | OUTPATIENT
Start: 2022-04-28 | End: 2022-04-29 | Stop reason: HOSPADM

## 2022-04-28 RX ORDER — ALBUTEROL SULFATE 2.5 MG/3ML
2.5 SOLUTION RESPIRATORY (INHALATION) EVERY 6 HOURS PRN
Status: DISCONTINUED | OUTPATIENT
Start: 2022-04-28 | End: 2022-04-29 | Stop reason: HOSPADM

## 2022-04-28 RX ORDER — BUDESONIDE AND FORMOTEROL FUMARATE DIHYDRATE 160; 4.5 UG/1; UG/1
2 AEROSOL RESPIRATORY (INHALATION)
Status: DISCONTINUED | OUTPATIENT
Start: 2022-04-28 | End: 2022-04-29 | Stop reason: HOSPADM

## 2022-04-28 RX ADMIN — POTASSIUM CHLORIDE 40 MEQ: 1.5 POWDER, FOR SOLUTION ORAL at 08:18

## 2022-04-28 RX ADMIN — HYDRALAZINE HYDROCHLORIDE 25 MG: 50 TABLET, FILM COATED ORAL at 15:35

## 2022-04-28 RX ADMIN — PANTOPRAZOLE SODIUM 40 MG: 40 TABLET, DELAYED RELEASE ORAL at 08:18

## 2022-04-28 RX ADMIN — IPRATROPIUM BROMIDE AND ALBUTEROL SULFATE 3 ML: 2.5; .5 SOLUTION RESPIRATORY (INHALATION) at 20:05

## 2022-04-28 RX ADMIN — METOPROLOL SUCCINATE 50 MG: 50 TABLET, EXTENDED RELEASE ORAL at 21:20

## 2022-04-28 RX ADMIN — ACETAMINOPHEN 650 MG: 325 TABLET ORAL at 21:20

## 2022-04-28 RX ADMIN — IPRATROPIUM BROMIDE AND ALBUTEROL SULFATE 3 ML: 2.5; .5 SOLUTION RESPIRATORY (INHALATION) at 11:38

## 2022-04-28 RX ADMIN — AMLODIPINE BESYLATE 5 MG: 5 TABLET ORAL at 08:18

## 2022-04-28 RX ADMIN — HYDRALAZINE HYDROCHLORIDE 25 MG: 50 TABLET, FILM COATED ORAL at 08:18

## 2022-04-28 RX ADMIN — HYDRALAZINE HYDROCHLORIDE 25 MG: 50 TABLET, FILM COATED ORAL at 21:20

## 2022-04-29 ENCOUNTER — HOME HEALTH ADMISSION (OUTPATIENT)
Dept: HOME HEALTH SERVICES | Facility: HOME HEALTHCARE | Age: 77
End: 2022-04-29

## 2022-04-29 ENCOUNTER — APPOINTMENT (OUTPATIENT)
Dept: CARDIOLOGY | Facility: HOSPITAL | Age: 77
End: 2022-04-29

## 2022-04-29 VITALS
SYSTOLIC BLOOD PRESSURE: 171 MMHG | WEIGHT: 125 LBS | TEMPERATURE: 98.4 F | HEART RATE: 73 BPM | BODY MASS INDEX: 25.2 KG/M2 | HEIGHT: 59 IN | DIASTOLIC BLOOD PRESSURE: 85 MMHG | RESPIRATION RATE: 20 BRPM | OXYGEN SATURATION: 97 %

## 2022-04-29 PROBLEM — I50.22 CHRONIC SYSTOLIC HEART FAILURE (HCC): Status: ACTIVE | Noted: 2022-04-29

## 2022-04-29 LAB
ALBUMIN SERPL-MCNC: 3.5 G/DL (ref 3.5–5.2)
ANION GAP SERPL CALCULATED.3IONS-SCNC: 13.4 MMOL/L (ref 5–15)
BH CV ECHO MEAS - DIST REN A EDV LEFT: -23.2 CM/S
BH CV ECHO MEAS - DIST REN A PSV LEFT: -100.6 CM/S
BH CV ECHO MEAS - MID REN A EDV LEFT: -29.2 CM/S
BH CV ECHO MEAS - MID REN A PSV LEFT: -113.9 CM/S
BH CV ECHO MEAS - PROX REN A EDV LEFT: 37.5 CM/S
BH CV ECHO MEAS - PROX REN A PSV LEFT: 260 CM/S
BH CV VAS BP LEFT ARM: NORMAL MMHG
BH CV VAS BP RIGHT ARM: NORMAL MMHG
BH CV VAS RENAL AORTIC MID PSV: 101 CM/S
BH CV VAS RENAL HILUM LEFT EDV: 11.2 CM/S
BH CV VAS RENAL HILUM LEFT PSV: 45.3 CM/S
BH CV XLRA MEAS - KID L LEFT: 8.3 CM
BH CV XLRA MEAS DIST REN A EDV RIGHT: -22.4 CM/S
BH CV XLRA MEAS DIST REN A PSV RIGHT: -106 CM/S
BH CV XLRA MEAS KID L RIGHT: 5.1 CM
BH CV XLRA MEAS MID REN A EDV RIGHT: -18.1 CM/S
BH CV XLRA MEAS MID REN A PSV RIGHT: -111 CM/S
BH CV XLRA MEAS PROX REN A EDV RIGHT: 27.5 CM/S
BH CV XLRA MEAS PROX REN A PSV RIGHT: 128.5 CM/S
BH CV XLRA MEAS RAR LEFT: 2.8
BH CV XLRA MEAS RAR RIGHT: 1.1
BH CV XLRA MEAS RENAL A ORG EDV LEFT: 44.9 CM/S
BH CV XLRA MEAS RENAL A ORG EDV RIGHT: 20.9 CM/S
BH CV XLRA MEAS RENAL A ORG PSV LEFT: 291.7 CM/S
BH CV XLRA MEAS RENAL A ORG PSV RIGHT: 128.5 CM/S
BUN SERPL-MCNC: 40 MG/DL (ref 8–23)
BUN/CREAT SERPL: 20 (ref 7–25)
CALCIUM SPEC-SCNC: 8.9 MG/DL (ref 8.6–10.5)
CHLORIDE SERPL-SCNC: 98 MMOL/L (ref 98–107)
CO2 SERPL-SCNC: 26.6 MMOL/L (ref 22–29)
CREAT SERPL-MCNC: 2 MG/DL (ref 0.57–1)
EGFRCR SERPLBLD CKD-EPI 2021: 25.3 ML/MIN/1.73
GLUCOSE BLDC GLUCOMTR-MCNC: 158 MG/DL (ref 70–130)
GLUCOSE BLDC GLUCOMTR-MCNC: 84 MG/DL (ref 70–130)
GLUCOSE BLDC GLUCOMTR-MCNC: 87 MG/DL (ref 70–130)
GLUCOSE SERPL-MCNC: 83 MG/DL (ref 65–99)
LEFT KIDNEY WIDTH: 3.9 CM
LEFT RENAL UPPER PARENCHYMA MAX: 15.4 CM/S
LEFT RENAL UPPER PARENCHYMA MIN: 4.57 CM/S
LEFT RENAL UPPER PARENCHYMA RI: 0.7
MAXIMAL PREDICTED HEART RATE: 143 BPM
PHOSPHATE SERPL-MCNC: 5.2 MG/DL (ref 2.5–4.5)
POTASSIUM SERPL-SCNC: 3.4 MMOL/L (ref 3.5–5.2)
SODIUM SERPL-SCNC: 138 MMOL/L (ref 136–145)
STRESS TARGET HR: 122 BPM

## 2022-04-29 PROCEDURE — 97116 GAIT TRAINING THERAPY: CPT

## 2022-04-29 PROCEDURE — 63710000001 INSULIN LISPRO (HUMAN) PER 5 UNITS: Performed by: NURSE PRACTITIONER

## 2022-04-29 PROCEDURE — 94799 UNLISTED PULMONARY SVC/PX: CPT

## 2022-04-29 PROCEDURE — 99214 OFFICE O/P EST MOD 30 MIN: CPT

## 2022-04-29 PROCEDURE — G0378 HOSPITAL OBSERVATION PER HR: HCPCS

## 2022-04-29 PROCEDURE — 80069 RENAL FUNCTION PANEL: CPT | Performed by: HOSPITALIST

## 2022-04-29 PROCEDURE — 94664 DEMO&/EVAL PT USE INHALER: CPT

## 2022-04-29 PROCEDURE — 94761 N-INVAS EAR/PLS OXIMETRY MLT: CPT

## 2022-04-29 PROCEDURE — 93975 VASCULAR STUDY: CPT

## 2022-04-29 PROCEDURE — 82962 GLUCOSE BLOOD TEST: CPT

## 2022-04-29 RX ORDER — POTASSIUM CHLORIDE 750 MG/1
40 TABLET, FILM COATED, EXTENDED RELEASE ORAL ONCE
Status: COMPLETED | OUTPATIENT
Start: 2022-04-29 | End: 2022-04-29

## 2022-04-29 RX ORDER — HYDRALAZINE HYDROCHLORIDE 50 MG/1
25 TABLET, FILM COATED ORAL 3 TIMES DAILY
Start: 2022-04-29 | End: 2023-03-16 | Stop reason: ALTCHOICE

## 2022-04-29 RX ORDER — METOPROLOL SUCCINATE 50 MG/1
50 TABLET, EXTENDED RELEASE ORAL NIGHTLY
Qty: 30 TABLET | Refills: 0 | Status: SHIPPED | OUTPATIENT
Start: 2022-04-29

## 2022-04-29 RX ORDER — AMLODIPINE BESYLATE 5 MG/1
5 TABLET ORAL
Qty: 30 TABLET | Refills: 0 | Status: SHIPPED | OUTPATIENT
Start: 2022-04-30

## 2022-04-29 RX ADMIN — HYDRALAZINE HYDROCHLORIDE 25 MG: 50 TABLET, FILM COATED ORAL at 08:37

## 2022-04-29 RX ADMIN — POTASSIUM CHLORIDE 40 MEQ: 10 TABLET, EXTENDED RELEASE ORAL at 08:37

## 2022-04-29 RX ADMIN — HYDRALAZINE HYDROCHLORIDE 25 MG: 50 TABLET, FILM COATED ORAL at 16:30

## 2022-04-29 RX ADMIN — IPRATROPIUM BROMIDE AND ALBUTEROL SULFATE 3 ML: 2.5; .5 SOLUTION RESPIRATORY (INHALATION) at 15:35

## 2022-04-29 RX ADMIN — PANTOPRAZOLE SODIUM 40 MG: 40 TABLET, DELAYED RELEASE ORAL at 08:37

## 2022-04-29 RX ADMIN — AMLODIPINE BESYLATE 5 MG: 5 TABLET ORAL at 08:37

## 2022-04-29 RX ADMIN — IPRATROPIUM BROMIDE AND ALBUTEROL SULFATE 3 ML: 2.5; .5 SOLUTION RESPIRATORY (INHALATION) at 08:49

## 2022-04-29 RX ADMIN — INSULIN LISPRO 2 UNITS: 100 INJECTION, SOLUTION INTRAVENOUS; SUBCUTANEOUS at 17:49

## 2022-04-30 ENCOUNTER — READMISSION MANAGEMENT (OUTPATIENT)
Dept: CALL CENTER | Facility: HOSPITAL | Age: 77
End: 2022-04-30

## 2022-04-30 NOTE — OUTREACH NOTE
Prep Survey    Flowsheet Row Responses   [facility] facility patient discharged from? [place]   Is LACE score < 7 ? No   Emergency Room discharge w/ pulse ox? No   Eligibility Readm Mgmt   Discharge diagnosis Atypical chest pain   Does the patient have one of the following disease processes/diagnoses(primary or secondary)? CHF   Does the patient have Home health ordered? Yes   What is the Home health agency?  [facility]   Is there a DME ordered? No   Comments regarding appointments see AVS   Medication alerts for this patient Norvasc,  CHANGE - Apresoline and Toprol XL   Prep survey completed? Yes          TAI HILL - Registered Nurse

## 2022-05-02 ENCOUNTER — NURSE TRIAGE (OUTPATIENT)
Dept: CALL CENTER | Facility: HOSPITAL | Age: 77
End: 2022-05-02

## 2022-05-02 NOTE — CASE MANAGEMENT/SOCIAL WORK
Case Management Discharge Note      Final Note: Pt discharged home on 4/29.  [facility] did not accept pt because she is not home bound.   RUTH Shearer RN         Selected Continued Care - Discharged on 4/29/2022 Admission date: 4/27/2022 - Discharge disposition: Home or Self Care    Destination    No services have been selected for the patient.              Durable Medical Equipment    No services have been selected for the patient.              Dialysis/Infusion    No services have been selected for the patient.              Home Medical Care Coordination complete.    Service Provider Selected Services Address Phone Fax Patient Preferred    [facility]  Home Health Services [address] [address] [phone] --          Therapy    No services have been selected for the patient.              Community Resources    No services have been selected for the patient.              Community & DME    No services have been selected for the patient.                  Transportation Services  Private: Car    Final Discharge Disposition Code: 01 - home or self-care

## 2022-05-02 NOTE — TELEPHONE ENCOUNTER
"Questions concerning her discharge, wanting to know what is a basic metabolic panel. Explained it is for electrolytes and renal functions. Will be calling provider to have lab test ordered    Reason for Disposition  • Health Information question, no triage required and triager able to answer question    Additional Information  • Negative: [1] Caller is not with the adult (patient) AND [2] reporting urgent symptoms  • Negative: Lab result questions  • Negative: Medication questions  • Negative: Caller can't be reached by phone  • Negative: Caller has already spoken to PCP or another triager  • Negative: RN needs further essential information from caller in order to complete triage  • Negative: Requesting regular office appointment  • Negative: [1] Caller requesting NON-URGENT health information AND [2] PCP's office is the best resource    Answer Assessment - Initial Assessment Questions  1. REASON FOR CALL or QUESTION: \"What is your reason for calling today?\" or \"How can I best help you?\" or \"What question do you have that I can help answer?\"  Questions about labs    Protocols used: INFORMATION ONLY CALL - NO TRIAGE-ADULT-      "

## 2022-05-03 ENCOUNTER — READMISSION MANAGEMENT (OUTPATIENT)
Dept: CALL CENTER | Facility: HOSPITAL | Age: 77
End: 2022-05-03

## 2022-05-03 NOTE — PROGRESS NOTES
Subjective   History of Present Illness: Mary Boyle is a 76 y.o. female is here today for a 1Y follow-up of NPH.   shunt set at 2.0  CT head done on 5/11/22.  She is doing well today.  She denies any changes in strength or sensation.  She is using a rolling walker today walking throughout the office.  She reports that at home she is able to walk without using the rolling walker, but uses it for longer distances.  She denies any recent changes in her mental status or confusion.    History of Present Illness    The following portions of the patient's history were reviewed and updated as appropriate: allergies, past family history, past medical history, past social history, past surgical history and problem list.    Past Medical History:   Diagnosis Date   • Arthritis    • Asthma    • Chest pain    • Chronic kidney disease    • Depression    • Dizziness 11/11/2020    Went to ER with dizziness and generalized weakness--found to have hydrocephalus.    • Ganglion cyst     right leg   • GERD (gastroesophageal reflux disease)    • Gout    • Hiatal hernia    • Hypertension    • Hypokalemia    • Mild mitral regurgitation    • Normal pressure hydrocephalus (HCC)    • Personality disorder (HCC)     PER NOTE IN EPIC    • PONV (postoperative nausea and vomiting)    • Recovering alcoholic (HCC)     SOBER SINCE 2003   • Thyroid nodule    • Tricuspid regurgitation    • Vitamin D deficiency         Past Surgical History:   Procedure Laterality Date   • APPENDECTOMY     • BREAST LUMPECTOMY     • CARDIAC ELECTROPHYSIOLOGY PROCEDURE N/A 5/26/2021    Procedure: Pacemaker DC new [place];  Surgeon: Stephanie Shanks MD;  Location: [facility] INVASIVE LOCATION;  Service: Cardiology;  Laterality: N/A;   • CHOLECYSTECTOMY     • GANGLION CYST EXCISION     • HERNIA REPAIR     • HYSTERECTOMY     •  SHUNT INSERTION N/A 11/19/2020    Procedure: Right sided VENTRICULAR PERITONEAL SHUNT INSERTION STERIOTACTIC;  Surgeon: Jean Claude  MD Xavi;  Location: [facility] OR;  Service: Neurosurgery;  Laterality: N/A;          Current Outpatient Medications:   •  acetaminophen (TYLENOL) 325 MG tablet, Take 2 tablets by mouth Every 4 (Four) Hours As Needed for Mild Pain . (Patient taking differently: Take 325 mg by mouth Every 4 (Four) Hours As Needed for Mild Pain .), Disp:  , Rfl:   •  albuterol sulfate  (90 Base) MCG/ACT inhaler, 2 puffs Every 4 (Four) Hours As Needed., Disp: , Rfl:   •  allopurinol (ZYLOPRIM) 100 MG tablet, Take 1 tablet by mouth Daily., Disp: 90 tablet, Rfl: 3  •  amLODIPine (NORVASC) 5 MG tablet, Take 1 tablet by mouth Daily., Disp: 30 tablet, Rfl: 0  •  Ascorbic Acid (Vitamin C) 500 MG chewable tablet, Chew Daily., Disp: , Rfl:   •  b complex-C-folic acid 1 MG capsule, Take 1 capsule by mouth Daily., Disp: , Rfl:   •  Cholecalciferol (Vitamin D-3) 25 MCG (1000 UT) capsule, Take 1,000 Units by mouth Daily., Disp: , Rfl:   •  esomeprazole (nexIUM) 20 MG capsule, Take 20 mg by mouth Every Morning Before Breakfast., Disp: , Rfl:   •  fexofenadine (ALLEGRA) 60 MG tablet, Take 60 mg by mouth Daily., Disp: , Rfl:   •  furosemide (LASIX) 20 MG tablet, Take 20 mg by mouth Daily. Patient takes 1/2 tablet a day..., Disp: , Rfl:   •  hydrALAZINE (APRESOLINE) 50 MG tablet, Take 0.5 tablets by mouth 3 (Three) Times a Day. Patient states she takes 75 mg. TID per Dr. Prather., Disp: , Rfl:   •  ipratropium-albuterol (DUO-NEB) 0.5-2.5 mg/3 ml nebulizer, , Disp: , Rfl:   •  MAGNESIUM PO, Take 20 mg by mouth Daily., Disp: , Rfl:   •  metoprolol succinate XL (TOPROL-XL) 50 MG 24 hr tablet, Take 1 tablet by mouth Every Night., Disp: 30 tablet, Rfl: 0  •  Probiotic Product (HEALTHY COLON PO), Take 1 tablet/day by mouth., Disp: , Rfl:      Allergies   Allergen Reactions   • Iodinated Diagnostic Agents Shortness Of Breath     Asthma attack and hives   • Aspirin Other (See Comments)     WHEEZING       • Doxycycline Other (See Comments)      "PT DOESN'T REMEMBER    • Sertraline Diarrhea, Nausea And Vomiting and Other (See Comments)     CHEST PAIN             Social History     Socioeconomic History   • Marital status:    Tobacco Use   • Smoking status: Former Smoker     Packs/day: 2.00     Years: 20.00     Pack years: 40.00     Types: Cigarettes     Quit date:      Years since quittin.3   • Smokeless tobacco: Never Used   Vaping Use   • Vaping Use: Never used   Substance and Sexual Activity   • Alcohol use: No     Comment: \"18 years sobriety\" quit    • Drug use: No   • Sexual activity: Never     Birth control/protection: Surgical        Family History   Problem Relation Age of Onset   • Heart attack Other    • Heart disease Other    • Hypertension Father    • Stroke Father    • Malig Hyperthermia Neg Hx         Review of Systems   Gastrointestinal: Negative for nausea and vomiting.   Genitourinary: Positive for enuresis (incontinence/stage 4 CKD). Negative for difficulty urinating.   Musculoskeletal: Positive for gait problem (off balance).   Neurological: Positive for dizziness (at night), weakness and headaches (occasionally). Negative for tremors, seizures, syncope, speech difficulty, light-headedness and numbness.   Psychiatric/Behavioral: Negative for confusion and decreased concentration.       Objective     Vitals:    22 1409   BP: 150/78   Pulse: 67   Resp: 20   Temp: 97.1 °F (36.2 °C)   SpO2: 97%   Weight: 58.5 kg (129 lb)   Height: 149.9 cm (59\")     Body mass index is 26.05 kg/m².      Physical Exam  Neurologic Exam  Awake, alert, oriented x3  Pupils equal round reactive to light  Extraocular muscles intact  Face symmetric  Speech is fluent and clear  No pronator drift  Motor exam  Bilateral deltoids 5/5, bilateral biceps 5/5, bilateral triceps 5/5, bilateral wrist extension 5/5 bilateral hand  5/5  Bilateral hip flexion 5/5, bilateral knee extension 5/5, bilateral DF/PF 5/5  No clonus  No Augusta's reflex  2+ " bilateral patellar reflexes  2+ bilateral biceps reflex   Steady normal gait  Able to detect  light touch in all 4 extremities        Assessment & Plan   Independent Review of Radiographic Studies:      I personally reviewed the images from the following studies.  CT head without contrast from May 11, 2022  The CT head images shows stable expected postoperative changes from a right parietal  shunt.  There is no breaks or kinks in the shunt catheter.    Medical Decision Makin-year-old female s/p right-sided  shunt for NPH on 2020  -She has recovered very well from that procedure.  Her shunt valve is set to 2.0.  The postop CT appears stable.  -I have recommended that she follow-up in 1 year with repeat CT head.  I have asked her to call back or come back sooner if she develops any episodes of confusion, worsening of her gait stability, or headaches.    Diagnoses and all orders for this visit:    1. NPH (normal pressure hydrocephalus) (HCC) (Primary)  -     CT Head Without Contrast; Future    2. S/P  shunt      Return in about 1 year (around 2023).

## 2022-05-03 NOTE — OUTREACH NOTE
CHF Week 1 Survey    Flowsheet Row Responses   [facility] patient discharged from? [place]   Does the patient have one of the following disease processes/diagnoses(primary or secondary)? CHF   CHF Week 1 attempt successful? Yes   Call start time 1438   Call end time 1448   List who call center can speak with pt   Meds reviewed with patient/caregiver? Yes   Is the patient having any side effects they believe may be caused by any medication additions or changes? No   Does the patient have all medications ordered at discharge? Yes   Is the patient taking all medications as directed (includes completed medication regime)? Yes   Comments regarding appointments Pt to see pcp on May 9, 2022.   Does the patient have a primary care provider?  Yes   Has the patient kept scheduled appointments due by today? N/A   Has home health visited the patient within 72 hours of discharge? No   Pulse Ox monitoring None   Psychosocial issues? No   Did the patient receive a copy of their discharge instructions? Yes   Nursing interventions Reviewed instructions with patient   What is the patient's perception of their health status since discharge? Improving   Is the patient weighing daily? No   Does the patient have scales? Yes   Daily weight interventions Education provided on importance of daily weight   Is the patient able to teach back Heart Failure diet management? Yes   Is the patient able to teach back Heart Failure Zones? Yes   Is the patient able to teach back signs and symptoms of worsening condition? (i.e. weight gain, shortness of air, etc.) Yes   Is the patient/caregiver able to teach back the hierarchy of who to call/visit for symptoms/problems? PCP, Specialist, Home health nurse, Urgent Care, ED, 911 Yes    CHF Week 1 call completed? Yes   Wrap up additional comments Pt is improving. Pt did go out to lunch today. Pt does not qualify for  services. Pt to see pcp next week.          JOVANA SANTOS - Registered Nurse

## 2022-05-11 ENCOUNTER — HOSPITAL ENCOUNTER (OUTPATIENT)
Dept: CT IMAGING | Facility: HOSPITAL | Age: 77
Discharge: HOME | End: 2022-05-11
Admitting: NEUROLOGICAL SURGERY

## 2022-05-11 ENCOUNTER — READMISSION MANAGEMENT (OUTPATIENT)
Dept: CALL CENTER | Facility: HOSPITAL | Age: 77
End: 2022-05-11

## 2022-05-11 DIAGNOSIS — Z98.2 S/P VP SHUNT: ICD-10-CM

## 2022-05-11 DIAGNOSIS — G91.2 NPH (NORMAL PRESSURE HYDROCEPHALUS): ICD-10-CM

## 2022-05-11 PROCEDURE — 70450 CT HEAD/BRAIN W/O DYE: CPT

## 2022-05-11 NOTE — OUTREACH NOTE
CHF Week 2 Survey    Flowsheet Row Responses   [facility] patient discharged from? [place]   Does the patient have one of the following disease processes/diagnoses(primary or secondary)? CHF   Week 2 attempt successful? Yes   Call start time 1533   Call end time 1540   Discharge diagnosis Atypical chest pain   Person spoke with today (if not patient) and relationship pt   Meds reviewed with patient/caregiver? Yes   Does the patient have all medications ordered at discharge? Yes   Is the patient taking all medications as directed (includes completed medication regime)? Yes   Comments regarding appointments Pt had a CT scan today.  She will return to hospital for f/u with surgeon   Does the patient have a primary care provider?  Yes   Does the patient have an appointment with their PCP within 7 days of discharge? Yes   Comments regarding PCP [facility]-pt saw on monday. Labwork was done   Has the patient kept scheduled appointments due by today? Yes   What is the Home health agency?  [facility]   Home health comments HH denied due to patient being able to drive   Pulse Ox monitoring None   Psychosocial issues? No   Did the patient receive a copy of their discharge instructions? Yes   Nursing interventions Reviewed instructions with patient   What is the patient's perception of their health status since discharge? Improving   Is the patient weighing daily? No   Does the patient have scales? Yes   Is the patient able to teach back Heart Failure Zones? Yes   Is the patient able to teach back signs and symptoms of worsening condition? (i.e. weight gain, shortness of air, etc.) Yes   If the patient is a current smoker, are they able to teach back resources for cessation? Not a smoker   Is the patient/caregiver able to teach back the hierarchy of who to call/visit for symptoms/problems? PCP, Specialist, Home health nurse, Urgent Care, ED, 911 Yes   CHF Week 2 call completed? Yes   Wrap up additional comments Pt  is doing very well.  She is determined to maintain independence.  She denies needs at this time.           JOSSELIN HERNANDEZ - Registered Nurse

## 2022-05-13 ENCOUNTER — OFFICE VISIT (OUTPATIENT)
Dept: NEUROSURGERY | Facility: CLINIC | Age: 77
End: 2022-05-13

## 2022-05-13 VITALS
OXYGEN SATURATION: 97 % | DIASTOLIC BLOOD PRESSURE: 78 MMHG | RESPIRATION RATE: 20 BRPM | SYSTOLIC BLOOD PRESSURE: 150 MMHG | HEIGHT: 59 IN | WEIGHT: 129 LBS | BODY MASS INDEX: 26 KG/M2 | HEART RATE: 67 BPM | TEMPERATURE: 97.1 F

## 2022-05-13 DIAGNOSIS — Z98.2 S/P VP SHUNT: ICD-10-CM

## 2022-05-13 DIAGNOSIS — G91.2 NPH (NORMAL PRESSURE HYDROCEPHALUS): Primary | ICD-10-CM

## 2022-05-13 PROCEDURE — 99214 OFFICE O/P EST MOD 30 MIN: CPT | Performed by: NEUROLOGICAL SURGERY

## 2022-05-17 ENCOUNTER — OFFICE VISIT (OUTPATIENT)
Dept: CARDIOLOGY | Facility: CLINIC | Age: 77
End: 2022-05-17

## 2022-05-17 VITALS
HEIGHT: 59 IN | SYSTOLIC BLOOD PRESSURE: 159 MMHG | WEIGHT: 128 LBS | DIASTOLIC BLOOD PRESSURE: 69 MMHG | BODY MASS INDEX: 25.8 KG/M2 | HEART RATE: 61 BPM

## 2022-05-17 DIAGNOSIS — I50.22 CHRONIC SYSTOLIC HEART FAILURE: ICD-10-CM

## 2022-05-17 DIAGNOSIS — I10 ESSENTIAL HYPERTENSION: ICD-10-CM

## 2022-05-17 DIAGNOSIS — I42.8 NON-ISCHEMIC CARDIOMYOPATHY: ICD-10-CM

## 2022-05-17 DIAGNOSIS — I42.9 CARDIOMYOPATHY, UNSPECIFIED TYPE: ICD-10-CM

## 2022-05-17 DIAGNOSIS — Z95.0 PACEMAKER: ICD-10-CM

## 2022-05-17 DIAGNOSIS — I50.22 CHRONIC SYSTOLIC HEART FAILURE: Primary | ICD-10-CM

## 2022-05-17 PROCEDURE — 99214 OFFICE O/P EST MOD 30 MIN: CPT

## 2022-05-17 NOTE — PROGRESS NOTES
Subjective:        Mary Boyle is a 76 y.o. female who here for follow up    Chief Complaint   Patient presents with   • Follow-up     Hospital follow up        HPI    This is a 76-year-old female with hypertension, hyperlipidemia, pacemaker, chronic CHF, vitamin D deficiency, GERD, CKD, COPD.  She follows up in office today after discharge from the hospital.  She was admitted 4/27/2022 through 4/29/2022 for diarrhea and nausea.  She had echocardiogram checked while admitted.  Echo 4/28/2022 EF 40%, grade 1 LV diastolic dysfunction, hypokinetic LV wall segments,Compared to her previous echo 5/10/2021 EF was 62%, grade 1 LV diastolic dysfunction.. Stress test 9/11/2019 myocardial perfusion imaging indicates a normal study with no evidence of ischemia.    The following portions of the patient's history were reviewed and updated as appropriate: allergies, current medications, past family history, past medical history, past social history, past surgical history and problem list.    Past Medical History:   Diagnosis Date   • Arthritis    • Asthma    • Chest pain    • Chronic kidney disease    • Depression    • Dizziness 11/11/2020    Went to ER with dizziness and generalized weakness--found to have hydrocephalus.    • Ganglion cyst     right leg   • GERD (gastroesophageal reflux disease)    • Gout    • Hiatal hernia    • Hypertension    • Hypokalemia    • Mild mitral regurgitation    • Normal pressure hydrocephalus (HCC)    • Personality disorder (HCC)     PER NOTE IN EPIC    • PONV (postoperative nausea and vomiting)    • Recovering alcoholic (HCC)     SOBER SINCE 2003   • Thyroid nodule    • Tricuspid regurgitation    • Vitamin D deficiency          reports that she quit smoking about 17 years ago. Her smoking use included cigarettes. She has a 40.00 pack-year smoking history. She has never used smokeless tobacco. She reports that she does not drink alcohol and does not use drugs.     Family History   Problem  Relation Age of Onset   • Heart attack Other    • Heart disease Other    • Hypertension Father    • Stroke Father    • Malig Hyperthermia Neg Hx        ROS     Review of Systems  Constitutional: No wt loss, fever, fatigue  Gastrointestinal: No nausea, abdominal pain  Behavioral/Psych: No insomnia or anxiety  Cardiovascular no chest pain or shortness of breath      Objective:           Vitals and nursing note reviewed.   Constitutional:       Appearance: Well-developed.   HENT:      Head: Normocephalic.      Right Ear: External ear normal.      Left Ear: External ear normal.   Neck:      Vascular: No JVD.   Pulmonary:      Effort: Pulmonary effort is normal. No respiratory distress.      Breath sounds: Normal breath sounds. No stridor. No rales.   Cardiovascular:      Normal rate. Regular rhythm.      No gallop.   Pulses:     Intact distal pulses.   Abdominal:      General: Bowel sounds are normal. There is no distension.      Palpations: Abdomen is soft.      Tenderness: There is no abdominal tenderness. There is no guarding.   Musculoskeletal: Normal range of motion.         General: No tenderness.      Cervical back: Normal range of motion. Skin:     General: Skin is warm.   Neurological:      Mental Status: Alert and oriented to person, place, and time.      Deep Tendon Reflexes: Reflexes are normal and symmetric.   Psychiatric:         Judgment: Judgment normal.         Procedures  4/2022  Interpretation Summary    · The left ventricular cavity is borderline dilated.  · Left ventricular diastolic function is consistent with (grade Ia w/high LAP) impaired relaxation.  · There is calcification of the aortic valve.  · The following left ventricular wall segments are hypokinetic: apical septal, mid inferoseptal and apex hypokinetic.  · There is no evidence of pericardial effusion. .  5/2021  Interpretation Summary    · Calculated left ventricular EF = 62.3% Estimated left ventricular EF was in agreement with the  calculated left ventricular EF.  · Left ventricular diastolic function is consistent with (grade I) impaired relaxation.  · There is no evidence of pericardial effusion    Interpretation Summary       · Findings consistent with a normal ECG stress test.  · Findings consistent with a normal ECG stress test.  · Left ventricular ejection fraction is hyperdynamic (Calculated EF > 70%).  · Myocardial perfusion imaging indicates a normal myocardial perfusion study with no evidence of ischemia.  · Impressions are consistent with a low risk study.           Current Outpatient Medications:   •  acetaminophen (TYLENOL) 325 MG tablet, Take 2 tablets by mouth Every 4 (Four) Hours As Needed for Mild Pain . (Patient taking differently: Take 325 mg by mouth Every 4 (Four) Hours As Needed for Mild Pain .), Disp:  , Rfl:   •  albuterol sulfate  (90 Base) MCG/ACT inhaler, 2 puffs Every 4 (Four) Hours As Needed., Disp: , Rfl:   •  allopurinol (ZYLOPRIM) 100 MG tablet, Take 1 tablet by mouth Daily., Disp: 90 tablet, Rfl: 3  •  amLODIPine (NORVASC) 5 MG tablet, Take 1 tablet by mouth Daily., Disp: 30 tablet, Rfl: 0  •  Ascorbic Acid (Vitamin C) 500 MG chewable tablet, Chew Daily., Disp: , Rfl:   •  b complex-C-folic acid 1 MG capsule, Take 1 capsule by mouth Daily., Disp: , Rfl:   •  Cholecalciferol (Vitamin D-3) 25 MCG (1000 UT) capsule, Take 1,000 Units by mouth Daily., Disp: , Rfl:   •  esomeprazole (nexIUM) 20 MG capsule, Take 20 mg by mouth Every Morning Before Breakfast., Disp: , Rfl:   •  fexofenadine (ALLEGRA) 60 MG tablet, Take 60 mg by mouth Daily., Disp: , Rfl:   •  furosemide (LASIX) 20 MG tablet, Take 20 mg by mouth Daily. Patient takes 1/2 tablet a day..., Disp: , Rfl:   •  hydrALAZINE (APRESOLINE) 50 MG tablet, Take 0.5 tablets by mouth 3 (Three) Times a Day. Patient states she takes 75 mg. TID per Dr. Prather., Disp: , Rfl:   •  ipratropium-albuterol (DUO-NEB) 0.5-2.5 mg/3 ml nebulizer, , Disp: , Rfl:   •   MAGNESIUM PO, Take 20 mg by mouth Daily., Disp: , Rfl:   •  metoprolol succinate XL (TOPROL-XL) 50 MG 24 hr tablet, Take 1 tablet by mouth Every Night., Disp: 30 tablet, Rfl: 0  •  Probiotic Product (HEALTHY COLON PO), Take 1 tablet/day by mouth., Disp: , Rfl:      Assessment:        Patient Active Problem List   Diagnosis   • Chest pain   • Colon polyp   • COPD (chronic obstructive pulmonary disease) ([facility])   • Diverticulosis   • Essential hypertension   • GERD without esophagitis   • Hypercholesterolemia   • Osteopenia   • Multiple thyroid nodules   • Chronic fatigue   • Prediabetes   • Vitamin D deficiency   • Renal insufficiency   • Leg swelling   • Hyperuricemia   • SOB (shortness of breath)   • Generalized weakness   • NPH (normal pressure hydrocephalus) ([facility])   • Bradycardia, sinus   • Pacemaker   • Abdominal pain   • Hyperglycemia   • Diarrhea   • CKD (chronic kidney disease), stage IV ([facility])   • Chronic systolic heart failure (CMS/HCC)               Plan:   1.  Cardiomyopathy: Echo EF 40%. She will need stress test.  She sees Dr. Vale, nephrology.  We will asked nephrology if they are agreeable to initiation of Entresto.    It was explained to the patient that stress testing carries 85% specificity/sensitivity, and does not rule out future cardiac event.  Risks of the procedure were explained to the patient including shortness of breath, induction of myocardial infarction, and dizziness.  Patient is agreeable to proceeding with stress testing.     2.  Hypertension: still slightly elevated.     3.  Pacemaker: normal functioning device.              No diagnosis found.    There are no diagnoses linked to this encounter.    COUNSELING: Leonidas Saenzeling was given to patient for the following topics: diagnostic results, risk factor reductions, impressions, risks and benefits of treatment options and importance of treatment compliance .       SMOKING COUNSELING: Denies    lexiscan and follow up.  Will check with nephrologist if ok to add entresto or losartan.       Addendum: nephrology agreeable to starting entresto. Will start Entresto 24/26 twice daily. Check BP daily and call for Systolic BP <100. and follow up with nephrology in 5 days per their request for labs.       Sincerely,   JAMES Lowe  [facility]  05/17/22  10:41 EDT    EMR Dragon/Transcription disclaimer:   Much of this encounter note is an electronic transcription/translation of spoken language to printed text. The electronic translation of spoken language may permit erroneous, or at times, nonsensical words or phrases to be inadvertently transcribed; Although I have reviewed the note for such errors, some may still exist.

## 2022-05-24 RX ORDER — SACUBITRIL AND VALSARTAN 24; 26 MG/1; MG/1
1 TABLET, FILM COATED ORAL 2 TIMES DAILY
Qty: 60 TABLET | Refills: 11 | Status: SHIPPED | OUTPATIENT
Start: 2022-05-24

## 2022-05-25 ENCOUNTER — TELEPHONE (OUTPATIENT)
Dept: CARDIOLOGY | Facility: CLINIC | Age: 77
End: 2022-05-25

## 2022-05-25 NOTE — TELEPHONE ENCOUNTER
----- Message from JAMES Lowe sent at 5/24/2022  8:29 AM EDT -----  Regarding: FW: Entresto  I have sent in RX for entresto.    Please notify Ms Boyle that she will need to have her labs checked at her nephrologist in 3-5 days after resuming. Please notify her that she will also need to check her BP daily and call if systolic is <100.     Thank you  Anne      ----- Message -----  From: Becca Vale DO  Sent: 5/19/2022  10:41 PM EDT  To: JAMES Lowe  Subject: Entresto                                         Jairon Rodríguez,     I saw Ms. Boyle in the office and she mentioned about Entresto. She does have advanced CKD, but her renal arteries seems to be patent an dher K level is ok.  I think have stopped her ARB in the past due to her kidney function but at that time she was also having issues with bradycardia which may have affected her renal function.     We can try Entresto. I did tell her to do labs for me about 3-5 days after she starts the Entresto if she is told by you guys to take it ( she has the lab order) and to watch her BP closely for hypotension.     Let me know if you have any questions/concerns.     Thank you!  Becca Vale DO  [facility]  P: [phone]  F: [phone]  Cell:  [phone]       PT informed

## 2022-06-09 ENCOUNTER — HOSPITAL ENCOUNTER (OUTPATIENT)
Dept: CARDIOLOGY | Facility: HOSPITAL | Age: 77
Discharge: HOME | End: 2022-06-09

## 2022-06-09 VITALS
SYSTOLIC BLOOD PRESSURE: 140 MMHG | BODY MASS INDEX: 25.8 KG/M2 | OXYGEN SATURATION: 98 % | WEIGHT: 128 LBS | DIASTOLIC BLOOD PRESSURE: 64 MMHG | HEIGHT: 59 IN | HEART RATE: 64 BPM

## 2022-06-09 LAB
BH CV IMMEDIATE POST TECH DATA BLOOD PRESSURE: NORMAL MMHG
BH CV IMMEDIATE POST TECH DATA HEART RATE: 85 BPM
BH CV REST NUCLEAR ISOTOPE DOSE: 10.9 MCI
BH CV STRESS BP STAGE 1: NORMAL
BH CV STRESS COMMENTS STAGE 1: NORMAL
BH CV STRESS DOSE REGADENOSON STAGE 1: 0.4
BH CV STRESS DURATION MIN STAGE 1: 1
BH CV STRESS DURATION SEC STAGE 1: 0
BH CV STRESS HR STAGE 1: 85
BH CV STRESS NUCLEAR ISOTOPE DOSE: 32.1 MCI
BH CV STRESS O2 STAGE 1: 99
BH CV STRESS PROTOCOL 1: NORMAL
BH CV STRESS RECOVERY BP: NORMAL MMHG
BH CV STRESS RECOVERY HR: 80 BPM
BH CV STRESS RECOVERY O2: 98 %
BH CV STRESS STAGE 1: 1
BH CV THREE MINUTE POST TECH DATA BLOOD PRESSURE: NORMAL MMHG
BH CV THREE MINUTE POST TECH DATA HEART RATE: 82 BPM
LV EF NUC BP: 71 %
MAXIMAL PREDICTED HEART RATE: 143 BPM
PERCENT MAX PREDICTED HR: 59.44 %
STRESS BASELINE BP: NORMAL MMHG
STRESS BASELINE HR: 64 BPM
STRESS O2 SAT REST: 98 %
STRESS PERCENT HR: 70 %
STRESS POST ESTIMATED WORKLOAD: 1 METS
STRESS POST EXERCISE DUR MIN: 1 MIN
STRESS POST EXERCISE DUR SEC: 0 SEC
STRESS POST O2 SAT PEAK: 99 %
STRESS POST PEAK BP: NORMAL MMHG
STRESS POST PEAK HR: 85 BPM
STRESS TARGET HR: 122 BPM

## 2022-06-09 PROCEDURE — 25010000002 REGADENOSON 0.4 MG/5ML SOLUTION: Performed by: INTERNAL MEDICINE

## 2022-06-09 PROCEDURE — 78452 HT MUSCLE IMAGE SPECT MULT: CPT

## 2022-06-09 PROCEDURE — 0 TECHNETIUM SESTAMIBI: Performed by: INTERNAL MEDICINE

## 2022-06-09 PROCEDURE — A9500 TC99M SESTAMIBI: HCPCS | Performed by: INTERNAL MEDICINE

## 2022-06-09 PROCEDURE — 93017 CV STRESS TEST TRACING ONLY: CPT

## 2022-06-09 PROCEDURE — 93018 CV STRESS TEST I&R ONLY: CPT | Performed by: INTERNAL MEDICINE

## 2022-06-09 PROCEDURE — 78452 HT MUSCLE IMAGE SPECT MULT: CPT | Performed by: INTERNAL MEDICINE

## 2022-06-09 RX ADMIN — TECHNETIUM TC 99M SESTAMIBI 1 DOSE: 1 INJECTION INTRAVENOUS at 11:57

## 2022-06-09 RX ADMIN — TECHNETIUM TC 99M SESTAMIBI 1 DOSE: 1 INJECTION INTRAVENOUS at 08:38

## 2022-06-09 RX ADMIN — REGADENOSON 0.4 MG: 0.08 INJECTION, SOLUTION INTRAVENOUS at 11:57

## 2022-06-23 ENCOUNTER — CLINICAL SUPPORT NO REQUIREMENTS (OUTPATIENT)
Dept: CARDIOLOGY | Facility: CLINIC | Age: 77
End: 2022-06-23

## 2022-06-23 ENCOUNTER — OFFICE VISIT (OUTPATIENT)
Dept: CARDIOLOGY | Facility: CLINIC | Age: 77
End: 2022-06-23

## 2022-06-23 VITALS
BODY MASS INDEX: 26.21 KG/M2 | DIASTOLIC BLOOD PRESSURE: 82 MMHG | SYSTOLIC BLOOD PRESSURE: 150 MMHG | HEART RATE: 62 BPM | WEIGHT: 130 LBS | HEIGHT: 59 IN

## 2022-06-23 DIAGNOSIS — R06.02 SOB (SHORTNESS OF BREATH): ICD-10-CM

## 2022-06-23 DIAGNOSIS — I10 ESSENTIAL HYPERTENSION: Primary | ICD-10-CM

## 2022-06-23 DIAGNOSIS — R00.1 BRADYCARDIA, SINUS: ICD-10-CM

## 2022-06-23 DIAGNOSIS — I50.23 ACUTE ON CHRONIC SYSTOLIC CHF (CONGESTIVE HEART FAILURE): ICD-10-CM

## 2022-06-23 DIAGNOSIS — Z95.0 PACEMAKER: Primary | ICD-10-CM

## 2022-06-23 PROCEDURE — 99213 OFFICE O/P EST LOW 20 MIN: CPT | Performed by: INTERNAL MEDICINE

## 2022-06-23 PROCEDURE — 93280 PM DEVICE PROGR EVAL DUAL: CPT | Performed by: INTERNAL MEDICINE

## 2023-01-05 ENCOUNTER — CLINICAL SUPPORT NO REQUIREMENTS (OUTPATIENT)
Dept: CARDIOLOGY | Facility: CLINIC | Age: 78
End: 2023-01-05
Payer: MEDICARE

## 2023-01-05 DIAGNOSIS — Z95.0 PACEMAKER: Primary | ICD-10-CM

## 2023-01-05 PROCEDURE — 93280 PM DEVICE PROGR EVAL DUAL: CPT | Performed by: INTERNAL MEDICINE

## 2023-02-02 ENCOUNTER — TELEPHONE (OUTPATIENT)
Dept: NEUROSURGERY | Facility: CLINIC | Age: 78
End: 2023-02-02

## 2023-02-02 NOTE — TELEPHONE ENCOUNTER
Caller: Mary Boyle    Relationship to patient: Self    Best call back number: [phone]    Patient is needing: PATIENT CALLED, PATIENT IS HAVING HIP REPLACEMENT SX AT THE END OF April AND IS WONDERING IF SHE CAN MOVE HER APPT UP TO BEFORE April. PLEASE CALL PATIENT TO ADVISE.    THANK YOU

## 2023-03-07 PROCEDURE — 93294 REM INTERROG EVL PM/LDLS PM: CPT | Performed by: INTERNAL MEDICINE

## 2023-03-07 PROCEDURE — 93296 REM INTERROG EVL PM/IDS: CPT | Performed by: INTERNAL MEDICINE

## 2023-03-16 ENCOUNTER — OFFICE VISIT (OUTPATIENT)
Dept: CARDIOLOGY | Facility: CLINIC | Age: 78
End: 2023-03-16
Payer: MEDICARE

## 2023-03-16 ENCOUNTER — HOSPITAL ENCOUNTER (OUTPATIENT)
Dept: CARDIOLOGY | Facility: HOSPITAL | Age: 78
Discharge: HOME | End: 2023-03-16
Admitting: INTERNAL MEDICINE
Payer: MEDICARE

## 2023-03-16 VITALS
BODY MASS INDEX: 26.21 KG/M2 | WEIGHT: 130 LBS | SYSTOLIC BLOOD PRESSURE: 166 MMHG | HEIGHT: 59 IN | DIASTOLIC BLOOD PRESSURE: 78 MMHG

## 2023-03-16 VITALS
WEIGHT: 141 LBS | SYSTOLIC BLOOD PRESSURE: 147 MMHG | HEART RATE: 69 BPM | DIASTOLIC BLOOD PRESSURE: 82 MMHG | BODY MASS INDEX: 30.42 KG/M2 | HEIGHT: 57 IN

## 2023-03-16 DIAGNOSIS — Z95.0 PACEMAKER: ICD-10-CM

## 2023-03-16 DIAGNOSIS — I42.8 NON-ISCHEMIC CARDIOMYOPATHY: ICD-10-CM

## 2023-03-16 DIAGNOSIS — I50.23 ACUTE ON CHRONIC SYSTOLIC CHF (CONGESTIVE HEART FAILURE): ICD-10-CM

## 2023-03-16 DIAGNOSIS — I10 ESSENTIAL HYPERTENSION: ICD-10-CM

## 2023-03-16 DIAGNOSIS — R06.02 SOB (SHORTNESS OF BREATH): ICD-10-CM

## 2023-03-16 DIAGNOSIS — R00.1 BRADYCARDIA, SINUS: ICD-10-CM

## 2023-03-16 DIAGNOSIS — I10 ESSENTIAL HYPERTENSION: Primary | ICD-10-CM

## 2023-03-16 LAB
AORTIC DIMENSIONLESS INDEX: 0.8 (DI)
BH CV ECHO MEAS - AO MAX PG: 7 MMHG
BH CV ECHO MEAS - AO MEAN PG: 3.9 MMHG
BH CV ECHO MEAS - AO ROOT DIAM: 2.9 CM
BH CV ECHO MEAS - AO V2 MAX: 131.9 CM/SEC
BH CV ECHO MEAS - AO V2 VTI: 28.7 CM
BH CV ECHO MEAS - AVA(I,D): 1.59 CM2
BH CV ECHO MEAS - EDV(CUBED): 117.3 ML
BH CV ECHO MEAS - EDV(MOD-SP2): 80 ML
BH CV ECHO MEAS - EDV(MOD-SP4): 80 ML
BH CV ECHO MEAS - EF(MOD-BP): 52.5 %
BH CV ECHO MEAS - EF(MOD-SP2): 52.5 %
BH CV ECHO MEAS - EF(MOD-SP4): 48.8 %
BH CV ECHO MEAS - ESV(CUBED): 51.6 ML
BH CV ECHO MEAS - ESV(MOD-SP2): 38 ML
BH CV ECHO MEAS - ESV(MOD-SP4): 41 ML
BH CV ECHO MEAS - FS: 24 %
BH CV ECHO MEAS - IVS/LVPW: 1.09 CM
BH CV ECHO MEAS - IVSD: 1.08 CM
BH CV ECHO MEAS - LAT PEAK E' VEL: 6.6 CM/SEC
BH CV ECHO MEAS - LV DIASTOLIC VOL/BSA (35-75): 52.1 CM2
BH CV ECHO MEAS - LV MASS(C)D: 185.3 GRAMS
BH CV ECHO MEAS - LV MAX PG: 4.2 MMHG
BH CV ECHO MEAS - LV MEAN PG: 2.29 MMHG
BH CV ECHO MEAS - LV SYSTOLIC VOL/BSA (12-30): 26.7 CM2
BH CV ECHO MEAS - LV V1 MAX: 102.2 CM/SEC
BH CV ECHO MEAS - LV V1 VTI: 22.7 CM
BH CV ECHO MEAS - LVIDD: 4.9 CM
BH CV ECHO MEAS - LVIDS: 3.7 CM
BH CV ECHO MEAS - LVOT AREA: 2.02 CM2
BH CV ECHO MEAS - LVOT DIAM: 1.6 CM
BH CV ECHO MEAS - LVPWD: 1 CM
BH CV ECHO MEAS - MED PEAK E' VEL: 4.7 CM/SEC
BH CV ECHO MEAS - MR MAX PG: 52.6 MMHG
BH CV ECHO MEAS - MR MAX VEL: 362.8 CM/SEC
BH CV ECHO MEAS - MV A DUR: 0.11 SEC
BH CV ECHO MEAS - MV A MAX VEL: 137.7 CM/SEC
BH CV ECHO MEAS - MV DEC SLOPE: 444.5 CM/SEC2
BH CV ECHO MEAS - MV DEC TIME: 209 MSEC
BH CV ECHO MEAS - MV E MAX VEL: 88.1 CM/SEC
BH CV ECHO MEAS - MV E/A: 0.64
BH CV ECHO MEAS - MV MAX PG: 8.8 MMHG
BH CV ECHO MEAS - MV MEAN PG: 2.5 MMHG
BH CV ECHO MEAS - MV P1/2T: 57.2 MSEC
BH CV ECHO MEAS - MV V2 VTI: 30.5 CM
BH CV ECHO MEAS - MVA(P1/2T): 3.8 CM2
BH CV ECHO MEAS - MVA(VTI): 1.5 CM2
BH CV ECHO MEAS - PA ACC TIME: 0.13 SEC
BH CV ECHO MEAS - PA PR(ACCEL): 19.6 MMHG
BH CV ECHO MEAS - PA V2 MAX: 97.9 CM/SEC
BH CV ECHO MEAS - RAP SYSTOLE: 8 MMHG
BH CV ECHO MEAS - RV MAX PG: 1.69 MMHG
BH CV ECHO MEAS - RV V1 MAX: 65 CM/SEC
BH CV ECHO MEAS - RV V1 VTI: 14.3 CM
BH CV ECHO MEAS - RVSP: 28.3 MMHG
BH CV ECHO MEAS - SI(MOD-SP2): 27.4 ML/M2
BH CV ECHO MEAS - SI(MOD-SP4): 25.4 ML/M2
BH CV ECHO MEAS - SV(LVOT): 45.8 ML
BH CV ECHO MEAS - SV(MOD-SP2): 42 ML
BH CV ECHO MEAS - SV(MOD-SP4): 39 ML
BH CV ECHO MEAS - TAPSE (>1.6): 1.22 CM
BH CV ECHO MEAS - TR MAX PG: 20.3 MMHG
BH CV ECHO MEAS - TR MAX VEL: 225.3 CM/SEC
BH CV ECHO MEASUREMENTS AVERAGE E/E' RATIO: 15.59
BH CV XLRA - RV BASE: 3.2 CM
BH CV XLRA - RV LENGTH: 6.5 CM
BH CV XLRA - RV MID: 3.2 CM
BH CV XLRA - TDI S': 7.9 CM/SEC
LEFT ATRIUM VOLUME INDEX: 20.6 ML/M2
MAXIMAL PREDICTED HEART RATE: 142 BPM
STRESS TARGET HR: 121 BPM

## 2023-03-16 PROCEDURE — 99214 OFFICE O/P EST MOD 30 MIN: CPT | Performed by: INTERNAL MEDICINE

## 2023-03-16 PROCEDURE — 3079F DIAST BP 80-89 MM HG: CPT | Performed by: INTERNAL MEDICINE

## 2023-03-16 PROCEDURE — 93306 TTE W/DOPPLER COMPLETE: CPT

## 2023-03-16 PROCEDURE — 3077F SYST BP >= 140 MM HG: CPT | Performed by: INTERNAL MEDICINE

## 2023-03-16 PROCEDURE — 25510000001 PERFLUTREN (DEFINITY) 8.476 MG IN SODIUM CHLORIDE (PF) 0.9 % 10 ML INJECTION: Performed by: INTERNAL MEDICINE

## 2023-03-16 PROCEDURE — 93000 ELECTROCARDIOGRAM COMPLETE: CPT | Performed by: INTERNAL MEDICINE

## 2023-03-16 PROCEDURE — 93306 TTE W/DOPPLER COMPLETE: CPT | Performed by: INTERNAL MEDICINE

## 2023-03-16 RX ADMIN — SODIUM CHLORIDE 2 ML: 9 INJECTION INTRAMUSCULAR; INTRAVENOUS; SUBCUTANEOUS at 14:19

## 2023-03-16 NOTE — PROGRESS NOTES
1 YR FOLLOW UP WITH ECHO TODAY  CLEARANCE FOR LEFT HIP REPLACEMENT  [facility]  [phone]   Subjective:        Mary Boyle is a 77 y.o. female who here for follow up    CC  CLEARANCE FOR HIP SURGERY  HPI  77-year-old with hypertension pacemaker nonischemic cardiomyopathy here for the clearance for the hip surgery denies any chest pains or tightness in the chest     Problems Addressed this Visit        Cardiac and Vasculature    Essential hypertension - Primary    Pacemaker    Non-ischemic cardiomyopathy (HCC)       Pulmonary and Pneumonias    SOB (shortness of breath)   Diagnoses       Codes Comments    Essential hypertension    -  Primary ICD-10-CM: I10  ICD-9-CM: 401.9     Pacemaker     ICD-10-CM: Z95.0  ICD-9-CM: V45.01     SOB (shortness of breath)     ICD-10-CM: R06.02  ICD-9-CM: 786.05     Non-ischemic cardiomyopathy (HCC)     ICD-10-CM: I42.8  ICD-9-CM: 425.4         .    The following portions of the patient's history were reviewed and updated as appropriate: allergies, current medications, past family history, past medical history, past social history, past surgical history and problem list.    Past Medical History:   Diagnosis Date   • Arthritis    • Asthma    • Chest pain    • Chronic kidney disease    • Depression    • Dizziness 11/11/2020    Went to ER with dizziness and generalized weakness--found to have hydrocephalus.    • Ganglion cyst     right leg   • GERD (gastroesophageal reflux disease)    • Gout    • Hiatal hernia    • Hypertension    • Hypokalemia    • Mild mitral regurgitation    • Normal pressure hydrocephalus (HCC)    • Personality disorder (HCC)     PER NOTE IN EPIC    • PONV (postoperative nausea and vomiting)    • Recovering alcoholic (HCC)     SOBER SINCE 2003   • Thyroid nodule    • Tricuspid regurgitation    • Vitamin D deficiency      reports that she quit smoking about 18 years ago. Her smoking use included cigarettes. She has a 40.00  "pack-year smoking history. She has never used smokeless tobacco. She reports that she does not drink alcohol and does not use drugs.   Family History   Problem Relation Age of Onset   • Heart attack Other    • Heart disease Other    • Hypertension Father    • Stroke Father    • Malig Hyperthermia Neg Hx        Review of Systems  Constitutional: No wt loss, fever, fatigue  Gastrointestinal: No nausea, abdominal pain  Behavioral/Psych: No insomnia or anxiety   Cardiovascular no chest pains or tightness in the chest  Objective:       Physical Exam           Physical Exam  /82   Pulse 69   Ht 144.8 cm (57\")   Wt 64 kg (141 lb)   BMI 30.51 kg/m²     General appearance: No acute changes   Eyes: Sclerae conjunctivae normal, pupils reactive   HENT: Atraumatic; oropharynx clear with moist mucous membranes and no mucosal ulcerations;  Neck: Trachea midline; NECK, supple, no thyromegaly or lymphadenopathy   Lungs: Normal size and shape, normal breath sounds, equal distribution of air, no rales and rhonchi   CV: S1-S2 regular, no murmurs, no rub, no gallop   Abdomen: Soft, nontender; no masses , no abnormal abdominal sounds   Extremities: No deformity , normal color , no peripheral edema   Skin: Normal temperature, turgor and texture; no rash, ulcers  Psych: Appropriate affect, alert and oriented to person, place and time             ECG 12 Lead    Date/Time: 3/16/2023 3:04 PM  Performed by: Stephanie Shanks MD  Authorized by: Stephanie Shanks MD   Comparison: compared with previous ECG   Similar to previous ECG  Rhythm: sinus rhythm  Conduction: left bundle branch block    Clinical impression: abnormal EKG              Echocardiogram:        Current Outpatient Medications:   •  acetaminophen (TYLENOL) 325 MG tablet, Take 2 tablets by mouth Every 4 (Four) Hours As Needed for Mild Pain . (Patient taking differently: Take 1 tablet by mouth Every 4 (Four) Hours As Needed for Mild Pain.), Disp:  , Rfl:   •  " albuterol sulfate  (90 Base) MCG/ACT inhaler, 2 puffs Every 4 (Four) Hours As Needed., Disp: , Rfl:   •  allopurinol (ZYLOPRIM) 100 MG tablet, Take 1 tablet by mouth Daily., Disp: 90 tablet, Rfl: 3  •  amLODIPine (NORVASC) 5 MG tablet, Take 1 tablet by mouth Daily., Disp: 30 tablet, Rfl: 0  •  Ascorbic Acid (Vitamin C) 500 MG chewable tablet, Chew Daily., Disp: , Rfl:   •  b complex-C-folic acid 1 MG capsule, Take 1 capsule by mouth Daily., Disp: , Rfl:   •  Cholecalciferol (Vitamin D-3) 25 MCG (1000 UT) capsule, Take 1,000 Units by mouth Daily., Disp: , Rfl:   •  esomeprazole (nexIUM) 20 MG capsule, Take 1 capsule by mouth Every Morning Before Breakfast., Disp: , Rfl:   •  fexofenadine (ALLEGRA) 60 MG tablet, Take 1 tablet by mouth Daily., Disp: , Rfl:   •  furosemide (LASIX) 20 MG tablet, Take 10 mg by mouth Daily. Patient takes 1 tablet a day..., Disp: , Rfl:   •  ipratropium-albuterol (DUO-NEB) 0.5-2.5 mg/3 ml nebulizer, , Disp: , Rfl:   •  metoprolol succinate XL (TOPROL-XL) 50 MG 24 hr tablet, Take 1 tablet by mouth Every Night., Disp: 30 tablet, Rfl: 0  •  Probiotic Product (HEALTHY COLON PO), Take 1 tablet/day by mouth., Disp: , Rfl:   •  sacubitril-valsartan (Entresto) 24-26 MG tablet, Take 1 tablet by mouth 2 (Two) Times a Day. (Patient taking differently: Take 1 tablet by mouth Daily. Unable to take twice daily, makes too dizzy, takes 1 dose at night and not having any problems), Disp: 60 tablet, Rfl: 11   Assessment:        Patient Active Problem List   Diagnosis   • Chest pain   • Colon polyp   • COPD (chronic obstructive pulmonary disease) (HCC)   • Diverticulosis   • Essential hypertension   • GERD without esophagitis   • Hypercholesterolemia   • Osteopenia   • Multiple thyroid nodules   • Chronic fatigue   • Prediabetes   • Vitamin D deficiency   • Renal insufficiency   • Leg swelling   • Hyperuricemia   • SOB (shortness of breath)   • Generalized weakness   • NPH (normal pressure  hydrocephalus) (HCC)   • Bradycardia, sinus   • Pacemaker   • Abdominal pain   • Hyperglycemia   • Diarrhea   • CKD (chronic kidney disease), stage IV (HCC)   • Chronic systolic heart failure (CMS/HCC)   • Non-ischemic cardiomyopathy (HCC)               Plan:            ICD-10-CM ICD-9-CM   1. Essential hypertension  I10 401.9   2. Pacemaker  Z95.0 V45.01   3. SOB (shortness of breath)  R06.02 786.05   4. Non-ischemic cardiomyopathy (HCC)  I42.8 425.4     1. Essential hypertension  Blood pressure under control    2. Pacemaker  Pacemaker functioning normally    3. SOB (shortness of breath)  Multifactorial    4. Non-ischemic cardiomyopathy (HCC)  Compensated       Mary Boyle seen and examined with no clinical signs of angina or chf, pt is cleared for surgery with non modifiable risk factors.  Mary Boyle has been advised to take cardiac meds with sip of water on the day of surgery.    Please use beta blocker for tachycardia perioperatively    Anticoagulation to be managed appropriately    Watch for chest pain, shortness of breath, palpitations, arrhythmias, and significant change in the blood pressure perioperatively,     Please check EKG preop and postop if any questions, notify us if any change in patient's cardiovascular conditions      COUNSELING:    Mary Starksounseling was given to patient for the following topics: diagnostic results, risk factor reductions, impressions, risks and benefits of treatment options and importance of treatment compliance .       SMOKING COUNSELING:        Dictated using Dragon dictation

## 2023-03-17 PROBLEM — I42.8 NON-ISCHEMIC CARDIOMYOPATHY: Status: ACTIVE | Noted: 2023-03-17

## 2023-06-06 ENCOUNTER — TELEPHONE (OUTPATIENT)
Dept: CARDIOLOGY | Facility: CLINIC | Age: 78
End: 2023-06-06
Payer: MEDICARE

## 2023-06-06 NOTE — TELEPHONE ENCOUNTER
Remote transmission reviewed.   Alerts on 5/11/23 for what appears to be atrial fib/flutter.  Longest duration 18 minutes 45 seconds.  No anticoagulation.    I called and spoke with patient.  She stated she had hip replacement on 5/8/23, and would have been in the rehab facility during these events.  She does not remember being symptomatic.     Patient has office device check on 6/15/23.

## 2023-11-13 ENCOUNTER — TELEPHONE (OUTPATIENT)
Dept: NEUROSURGERY | Facility: CLINIC | Age: 78
End: 2023-11-13
Payer: MEDICARE

## 2023-11-13 DIAGNOSIS — G91.2 NPH (NORMAL PRESSURE HYDROCEPHALUS): Primary | ICD-10-CM

## 2023-11-13 NOTE — TELEPHONE ENCOUNTER
CT scan ordered. Patient notified that she is overdue for CT scan. She will call scheduling to have is scheduled prior to following up with Dr. Mendez.

## 2023-11-13 NOTE — TELEPHONE ENCOUNTER
Caller: Mary Boyle    Relationship to patient: Self    Best call back number: [phone]     Patient is needing:     PATIENT R/S APPT WITH DR MORENO FOR FIRST OF THE YEAR BECAUSE SHE IS HOPING TO HAVE [employer] BCBS AT THAT TIME    ASKING IF SHE NEEDS CT ORDERED FOR APPT MADE ON 1/5    PLEASE ADVISE

## 2023-12-28 ENCOUNTER — CLINICAL SUPPORT NO REQUIREMENTS (OUTPATIENT)
Dept: CARDIOLOGY | Facility: CLINIC | Age: 78
End: 2023-12-28
Payer: MEDICARE

## 2023-12-28 DIAGNOSIS — Z95.0 PACEMAKER: Primary | ICD-10-CM

## 2024-01-04 ENCOUNTER — HOSPITAL ENCOUNTER (OUTPATIENT)
Facility: HOSPITAL | Age: 79
Discharge: HOME | End: 2024-01-04
Admitting: NEUROLOGICAL SURGERY
Payer: MEDICARE

## 2024-01-04 DIAGNOSIS — G91.2 NPH (NORMAL PRESSURE HYDROCEPHALUS): ICD-10-CM

## 2024-01-04 PROCEDURE — 70450 CT HEAD/BRAIN W/O DYE: CPT

## 2024-01-25 NOTE — PROGRESS NOTES
Subjective   History of Present Illness: Mary Boyle is a 78 y.o. female is here today for follow-up for NPH. CT head completed on 1/4/24.  She reports that occasionally when she lays on her right side she hears her heartbeat.  She also reports episodes of swelling in her breast near the  shunt catheter.  She denies any increased episodes of urinary incontinence.  Denies any increased instability while walking.  Denies any worsening of her confusion.    History of Present Illness    The following portions of the patient's history were reviewed and updated as appropriate: allergies, current medications, past family history, past medical history, past social history, past surgical history, and problem list.    Past Medical History:   Diagnosis Date    Arthritis     Asthma     Chest pain     Chronic kidney disease     Depression     Dizziness 11/11/2020    Went to ER with dizziness and generalized weakness--found to have hydrocephalus.     Ganglion cyst     right leg    GERD (gastroesophageal reflux disease)     Gout     Hiatal hernia     Hypertension     Hypokalemia     Mild mitral regurgitation     Normal pressure hydrocephalus     Personality disorder     PER NOTE IN EPIC     PONV (postoperative nausea and vomiting)     Recovering alcoholic     SOBER SINCE 2003    Thyroid nodule     Tricuspid regurgitation     Vitamin D deficiency         Past Surgical History:   Procedure Laterality Date    APPENDECTOMY      BREAST LUMPECTOMY      CARDIAC ELECTROPHYSIOLOGY PROCEDURE N/A 5/26/2021    Procedure: Pacemaker DC new [place];  Surgeon: Stephanie Shanks MD;  Location: [facility] INVASIVE LOCATION;  Service: Cardiology;  Laterality: N/A;    CHOLECYSTECTOMY      GANGLION CYST EXCISION      HERNIA REPAIR      HYSTERECTOMY       SHUNT INSERTION N/A 11/19/2020    Procedure: Right sided VENTRICULAR PERITONEAL SHUNT INSERTION STERIOTACTIC;  Surgeon: Xavi Silverio MD;  Location: [facility] MAIN OR;  Service:  Neurosurgery;  Laterality: N/A;          Current Outpatient Medications:     acetaminophen (TYLENOL) 325 MG tablet, Take 2 tablets by mouth Every 4 (Four) Hours As Needed for Mild Pain . (Patient taking differently: Take 1 tablet by mouth Every 4 (Four) Hours As Needed for Mild Pain.), Disp:  , Rfl:     albuterol sulfate  (90 Base) MCG/ACT inhaler, 2 puffs Every 4 (Four) Hours As Needed., Disp: , Rfl:     allopurinol (ZYLOPRIM) 100 MG tablet, Take 1 tablet by mouth Daily., Disp: 90 tablet, Rfl: 3    amLODIPine (NORVASC) 5 MG tablet, Take 1 tablet by mouth Daily., Disp: 30 tablet, Rfl: 0    Ascorbic Acid (Vitamin C) 500 MG chewable tablet, Chew Daily., Disp: , Rfl:     b complex-C-folic acid 1 MG capsule, Take 1 capsule by mouth Daily., Disp: , Rfl:     Cholecalciferol (Vitamin D-3) 25 MCG (1000 UT) capsule, Take 1,000 Units by mouth Daily., Disp: , Rfl:     esomeprazole (nexIUM) 20 MG capsule, Take 1 capsule by mouth Every Morning Before Breakfast., Disp: , Rfl:     fexofenadine (ALLEGRA) 60 MG tablet, Take 1 tablet by mouth Daily., Disp: , Rfl:     furosemide (LASIX) 20 MG tablet, Take 0.5 tablets by mouth Daily. Patient takes 1 tablet a day..., Disp: , Rfl:     ipratropium-albuterol (DUO-NEB) 0.5-2.5 mg/3 ml nebulizer, , Disp: , Rfl:     metoprolol succinate XL (TOPROL-XL) 50 MG 24 hr tablet, Take 1 tablet by mouth Every Night., Disp: 30 tablet, Rfl: 0    Probiotic Product (HEALTHY COLON PO), Take 1 tablet/day by mouth., Disp: , Rfl:     sacubitril-valsartan (Entresto) 24-26 MG tablet, Take 1 tablet by mouth 2 (Two) Times a Day., Disp: 60 tablet, Rfl: 11     Allergies   Allergen Reactions    Iodinated Contrast Media Shortness Of Breath     Asthma attack and hives    Aspirin Other (See Comments)     WHEEZING        Doxycycline Other (See Comments)     PT DOESN'T REMEMBER     Sertraline Diarrhea, Nausea And Vomiting and Other (See Comments)     CHEST PAIN             Social History     Socioeconomic History  "   Marital status:    Tobacco Use    Smoking status: Former     Packs/day: 2.00     Years: 20.00     Additional pack years: 0.00     Total pack years: 40.00     Types: Cigarettes     Quit date:      Years since quittin.0    Smokeless tobacco: Never   Vaping Use    Vaping Use: Never used   Substance and Sexual Activity    Alcohol use: No     Comment: \"18 years sobriety\" quit     Drug use: No    Sexual activity: Never     Birth control/protection: Surgical        Family History   Problem Relation Age of Onset    Heart attack Other     Heart disease Other     Hypertension Father     Stroke Father     Malig Hyperthermia Neg Hx         Review of Systems   Eyes:  Positive for visual disturbance.        R eye   Neurological:  Positive for dizziness and headaches. Negative for tremors, weakness, light-headedness and numbness.       Objective     Vitals:    24 1348   BP: 140/70   Cuff Size: Adult   Pulse: 68   Temp: 97.1 °F (36.2 °C)   SpO2: 98%   Weight: 64 kg (141 lb)   Height: 144.8 cm (57\")     Body mass index is 30.51 kg/m².      Physical Exam  Neurologic Exam  Awake, alert, oriented x3  Pupils equal round reactive to light  Extraocular muscles intact  Face symmetric  Speech is fluent and clear  No pronator drift  Motor exam  Bilateral deltoids 5/5, bilateral biceps 5/5, bilateral triceps 5/5, bilateral wrist extension 5/5 bilateral hand  5/5  Bilateral hip flexion 5/5, bilateral knee extension 5/5, bilateral DF/PF 5/5  No clonus  No Augusta's reflex  Able to detect  light touch in all 4 extremities  Shunt valve depresses and refills easily        Assessment & Plan   Independent Review of Radiographic Studies:      I personally reviewed the images from the following studies.  CT head without contrast from 2024  The CT images were reviewed and demonstrate expected postoperative changes with the shunt catheter in good position.  There are no obvious breaks or " disconnects    Medical Decision Makin-year-old female s/p right-sided  shunt for NPH on 2020  -She appears to be doing well today.  The shunt valve is set to 2.0.  It depresses and refills easily.  The follow-up CT images appear stable.  She has had no worsening of her NPH symptoms.  She is concerned that occasionally she will feel some fluid around the catheter near her breast.  She is also concerned that she hears her heartbeat in her ear when she lays on her side.  -I will plan to have her follow-up in 6 months with a repeat CT head and shunt series to evaluate for any breaks or disconnects in the shunt system or any changes in her symptoms.  I do not believe these current symptoms are related to the shunt.  The shunt appears to be working well today  Diagnoses and all orders for this visit:    1. NPH (normal pressure hydrocephalus) (Primary)  -     XR Shunt Series; Future  -     CT Head Without Contrast; Future    2. S/P  shunt  -     XR Shunt Series; Future  -     CT Head Without Contrast; Future      Return in about 6 months (around 2024).    I spent 30 minutes reviewing the medical record, checking the shunt setting, evaluating the CT images, discussing the management of NPH, discussing the signs and symptoms of a shunt malfunction  for NPH

## 2024-01-26 ENCOUNTER — OFFICE VISIT (OUTPATIENT)
Dept: NEUROSURGERY | Facility: CLINIC | Age: 79
End: 2024-01-26
Payer: MEDICARE

## 2024-01-26 VITALS
WEIGHT: 141 LBS | HEIGHT: 57 IN | DIASTOLIC BLOOD PRESSURE: 70 MMHG | OXYGEN SATURATION: 98 % | BODY MASS INDEX: 30.42 KG/M2 | TEMPERATURE: 97.1 F | SYSTOLIC BLOOD PRESSURE: 140 MMHG | HEART RATE: 68 BPM

## 2024-01-26 DIAGNOSIS — Z98.2 S/P VP SHUNT: ICD-10-CM

## 2024-01-26 DIAGNOSIS — G91.2 NPH (NORMAL PRESSURE HYDROCEPHALUS): Primary | ICD-10-CM

## 2024-01-26 PROCEDURE — 1160F RVW MEDS BY RX/DR IN RCRD: CPT | Performed by: NEUROLOGICAL SURGERY

## 2024-01-26 PROCEDURE — 99214 OFFICE O/P EST MOD 30 MIN: CPT | Performed by: NEUROLOGICAL SURGERY

## 2024-01-26 PROCEDURE — 3077F SYST BP >= 140 MM HG: CPT | Performed by: NEUROLOGICAL SURGERY

## 2024-01-26 PROCEDURE — 1159F MED LIST DOCD IN RCRD: CPT | Performed by: NEUROLOGICAL SURGERY

## 2024-01-26 PROCEDURE — 3078F DIAST BP <80 MM HG: CPT | Performed by: NEUROLOGICAL SURGERY

## 2024-03-07 ENCOUNTER — OFFICE VISIT (OUTPATIENT)
Dept: CARDIOLOGY | Facility: CLINIC | Age: 79
End: 2024-03-07
Payer: MEDICARE

## 2024-03-07 VITALS
WEIGHT: 141 LBS | HEIGHT: 57 IN | SYSTOLIC BLOOD PRESSURE: 149 MMHG | HEART RATE: 60 BPM | BODY MASS INDEX: 30.42 KG/M2 | DIASTOLIC BLOOD PRESSURE: 73 MMHG

## 2024-03-07 DIAGNOSIS — I10 ESSENTIAL HYPERTENSION: ICD-10-CM

## 2024-03-07 DIAGNOSIS — R06.02 SOB (SHORTNESS OF BREATH): ICD-10-CM

## 2024-03-07 DIAGNOSIS — I42.8 NON-ISCHEMIC CARDIOMYOPATHY: ICD-10-CM

## 2024-03-07 DIAGNOSIS — Z95.0 PACEMAKER: Primary | ICD-10-CM

## 2024-03-07 NOTE — PROGRESS NOTES
1 YR FOLLOW UP  SHORTNESS OF BREATH ON EXERTION   Subjective:        Mary Boyle is a 78 y.o. female who here for follow up    CC  Follow-up shortness of breath cardiomyopathy  HPI  78-year-old female with a hypertension pacemaker cardiomyopathy and shortness of breath here for the follow-up complaining of shortness of breath on exertion     Problems Addressed this Visit          Cardiac and Vasculature    Essential hypertension    Pacemaker - Primary    Non-ischemic cardiomyopathy       Pulmonary and Pneumonias    SOB (shortness of breath)     Diagnoses         Codes Comments    Pacemaker    -  Primary ICD-10-CM: Z95.0  ICD-9-CM: V45.01     Essential hypertension     ICD-10-CM: I10  ICD-9-CM: 401.9     SOB (shortness of breath)     ICD-10-CM: R06.02  ICD-9-CM: 786.05     Non-ischemic cardiomyopathy     ICD-10-CM: I42.8  ICD-9-CM: 425.4           .    The following portions of the patient's history were reviewed and updated as appropriate: allergies, current medications, past family history, past medical history, past social history, past surgical history and problem list.    Past Medical History:   Diagnosis Date    Arthritis     Asthma     Chest pain     Chronic kidney disease     Depression     Dizziness 11/11/2020    Went to ER with dizziness and generalized weakness--found to have hydrocephalus.     Ganglion cyst     right leg    GERD (gastroesophageal reflux disease)     Gout     Hiatal hernia     Hypertension     Hypokalemia     Mild mitral regurgitation     Normal pressure hydrocephalus     Personality disorder     PER NOTE IN EPIC     PONV (postoperative nausea and vomiting)     Recovering alcoholic     SOBER SINCE 2003    Thyroid nodule     Tricuspid regurgitation     Vitamin D deficiency      reports that she quit smoking about 19 years ago. Her smoking use included cigarettes. She started smoking about 39 years ago. She has a 40 pack-year smoking history. She has never used smokeless tobacco. She  "reports that she does not drink alcohol and does not use drugs.   Family History   Problem Relation Age of Onset    Heart attack Other     Heart disease Other     Hypertension Father     Stroke Father     Malig Hyperthermia Neg Hx        Review of Systems  Constitutional: No wt loss, fever, fatigue  Gastrointestinal: No nausea, abdominal pain  Behavioral/Psych: No insomnia or anxiety   Cardiovascular shortness of breath on exertion  Objective:       Physical Exam           Physical Exam  /73   Pulse 60   Ht 144.8 cm (57\")   Wt 64 kg (141 lb)   BMI 30.51 kg/m²     General appearance: No acute changes   Eyes: Sclerae conjunctivae normal, pupils reactive   HENT: Atraumatic; oropharynx clear with moist mucous membranes and no mucosal ulcerations;  Neck: Trachea midline; NECK, supple, no thyromegaly or lymphadenopathy   Lungs: Normal size and shape, normal breath sounds, equal distribution of air, no rales and rhonchi   CV: S1-S2 regular, no murmurs, no rub, no gallop   Abdomen: Soft, nontender; no masses , no abnormal abdominal sounds   Extremities: No deformity , normal color , no peripheral edema   Skin: Normal temperature, turgor and texture; no rash, ulcers  Psych: Appropriate affect, alert and oriented to person, place and time             ECG 12 Lead    Date/Time: 3/7/2024 1:28 PM  Performed by: Stephanie Shanks MD    Authorized by: Stephanie Shanks MD  Comparison: compared with previous ECG   Similar to previous ECG  Pacin% capture  Clinical impression: abnormal EKG            Echocardiogram:    Results for orders placed during the hospital encounter of 23    Adult Transthoracic Echo Complete W/ Cont if Necessary Per Protocol    Interpretation Summary    Left ventricular ejection fraction appears to be 51 - 55%.    Left ventricular diastolic function is consistent with (grade Ia w/high LAP) impaired relaxation.    Estimated right ventricular systolic pressure from tricuspid " regurgitation is normal (<35 mmHg).          Current Outpatient Medications:     acetaminophen (TYLENOL) 325 MG tablet, Take 2 tablets by mouth Every 4 (Four) Hours As Needed for Mild Pain . (Patient taking differently: Take 1 tablet by mouth Every 4 (Four) Hours As Needed for Mild Pain.), Disp:  , Rfl:     albuterol sulfate  (90 Base) MCG/ACT inhaler, 2 puffs Every 4 (Four) Hours As Needed., Disp: , Rfl:     allopurinol (ZYLOPRIM) 100 MG tablet, Take 1 tablet by mouth Daily., Disp: 90 tablet, Rfl: 3    amLODIPine (NORVASC) 5 MG tablet, Take 1 tablet by mouth Daily., Disp: 30 tablet, Rfl: 0    Ascorbic Acid (Vitamin C) 500 MG chewable tablet, Chew Daily., Disp: , Rfl:     b complex-C-folic acid 1 MG capsule, Take 1 capsule by mouth Daily., Disp: , Rfl:     Cholecalciferol (Vitamin D-3) 25 MCG (1000 UT) capsule, Take 1,000 Units by mouth Daily., Disp: , Rfl:     esomeprazole (nexIUM) 20 MG capsule, Take 1 capsule by mouth Every Morning Before Breakfast., Disp: , Rfl:     fexofenadine (ALLEGRA) 60 MG tablet, Take 1 tablet by mouth Daily., Disp: , Rfl:     furosemide (LASIX) 20 MG tablet, Take 0.5 tablets by mouth Daily. Patient takes 1 tablet a day..., Disp: , Rfl:     ipratropium-albuterol (DUO-NEB) 0.5-2.5 mg/3 ml nebulizer, , Disp: , Rfl:     metoprolol succinate XL (TOPROL-XL) 50 MG 24 hr tablet, Take 1 tablet by mouth Every Night., Disp: 30 tablet, Rfl: 0    Probiotic Product (HEALTHY COLON PO), Take 1 tablet/day by mouth., Disp: , Rfl:     sacubitril-valsartan (Entresto) 24-26 MG tablet, Take 1 tablet by mouth 2 (Two) Times a Day., Disp: 60 tablet, Rfl: 11   Assessment:                Plan:          ICD-10-CM ICD-9-CM   1. Pacemaker  Z95.0 V45.01   2. Essential hypertension  I10 401.9   3. SOB (shortness of breath)  R06.02 786.05   4. Non-ischemic cardiomyopathy  I42.8 425.4     1. Pacemaker  Pacemaker functioning normally    2. Essential hypertension  Patient understands importance of blood pressure  check at home which patient does regularly and the blood pressures are well under control to the level of less than 140/90      3. SOB (shortness of breath)  Multifactorial    4. Non-ischemic cardiomyopathy  Compensated      1 yr  COUNSELING:    Mary Segura was given to patient for the following topics: diagnostic results, risk factor reductions, impressions, risks and benefits of treatment options and importance of treatment compliance .       SMOKING COUNSELING:        Dictated using Dragon dictation

## 2024-06-13 ENCOUNTER — CLINICAL SUPPORT NO REQUIREMENTS (OUTPATIENT)
Dept: CARDIOLOGY | Facility: CLINIC | Age: 79
End: 2024-06-13
Payer: MEDICARE

## 2024-06-13 DIAGNOSIS — Z95.0 PACEMAKER: Primary | ICD-10-CM

## 2024-07-08 ENCOUNTER — TELEPHONE (OUTPATIENT)
Dept: NEUROSURGERY | Facility: CLINIC | Age: 79
End: 2024-07-08
Payer: MEDICARE

## 2024-07-08 NOTE — TELEPHONE ENCOUNTER
Basil,   Would you have any idea about this?     Ave she has [place] [employer]. She said she said they are needing codes to determine if it is covered under her insurance.

## 2024-07-08 NOTE — TELEPHONE ENCOUNTER
Caller: APOORVA HDEZ    Relationship: SELF    Best call back number: [phone]    What was the call regarding: PATIENT IS SCHEDULED CT HEAD AND XRAY SHUNT ON 07/10 - WANTS TO KNOW IF THIS WILL BE BILLED AS DIAGNOSTIC OR PREVENTIVE - STATES IF IT'S DIAGNOSTIC WILL COST TO BE MUCH TO BE DONE - BUT IF ITS BEING PREVENTIVE INS WILL NOT BE CHARGED ANYTHING    PLEASE ADVISE  THANK YOU    DID GIVE PATIENT NUMBER FOR BILLING    *CORRECTION - INS WILL NOT CHARGE PATIENT IF ITS BEING BILLED AS PREVENTIVE

## 2024-07-08 NOTE — TELEPHONE ENCOUNTER
Spoke to Insurance company , Billing  office and our Estimates team. Unfortunately the patient has not met her out of pocket for this year. Her copay is $195 at this time. I will call estimates to send out financial paperwork in the mail for her. She stated she may need to cancel appointment due to [facility] Berryl  hitting the [facility].

## 2024-07-09 NOTE — TELEPHONE ENCOUNTER
Patient was due for a follow up CT and shunt XR this month. She had CT head done in January. She states she believes she has figured out the issue with the fluid being around the catheter. If she wears a thicker bra it feels up but if she wears a thinner bra she does not have any issues. She denies any headaches, trouble walking or new incontinence.     She would like to know if she should still have imaging due now and pay the out of pocket or follow up in Jan 2025 with imaging ordered.

## 2024-07-10 NOTE — TELEPHONE ENCOUNTER
I spoke to patient. She does not need to follow up per Dr. Silverio if she is not having any issues. She can call back if she does have any new issues or concerns.

## 2024-07-19 RX ORDER — SACUBITRIL AND VALSARTAN 24; 26 MG/1; MG/1
1 TABLET, FILM COATED ORAL 2 TIMES DAILY
Qty: 60 TABLET | Refills: 5 | Status: SHIPPED | OUTPATIENT
Start: 2024-07-19

## 2024-09-09 PROCEDURE — 93296 REM INTERROG EVL PM/IDS: CPT | Performed by: INTERNAL MEDICINE

## 2024-09-09 PROCEDURE — 93294 REM INTERROG EVL PM/LDLS PM: CPT | Performed by: INTERNAL MEDICINE

## 2024-09-20 ENCOUNTER — TELEPHONE (OUTPATIENT)
Dept: NEUROSURGERY | Facility: CLINIC | Age: 79
End: 2024-09-20
Payer: MEDICARE

## 2024-09-23 DIAGNOSIS — Z98.2 S/P VP SHUNT: Primary | ICD-10-CM

## 2024-09-23 DIAGNOSIS — G91.2 NPH (NORMAL PRESSURE HYDROCEPHALUS): Primary | ICD-10-CM

## 2024-10-17 ENCOUNTER — HOSPITAL ENCOUNTER (OUTPATIENT)
Facility: HOSPITAL | Age: 79
Discharge: HOME | End: 2024-10-17
Payer: MEDICARE

## 2024-10-17 DIAGNOSIS — G91.2 NPH (NORMAL PRESSURE HYDROCEPHALUS): ICD-10-CM

## 2024-10-17 DIAGNOSIS — Z98.2 S/P VP SHUNT: ICD-10-CM

## 2024-10-17 PROCEDURE — 71046 X-RAY EXAM CHEST 2 VIEWS: CPT

## 2024-10-17 PROCEDURE — 74018 RADEX ABDOMEN 1 VIEW: CPT

## 2024-10-17 PROCEDURE — 70250 X-RAY EXAM OF SKULL: CPT

## 2024-10-17 PROCEDURE — 70450 CT HEAD/BRAIN W/O DYE: CPT

## 2024-11-04 NOTE — PROGRESS NOTES
Subjective   History of Present Illness: Mary Boyle is a 78 y.o. female is here today for follow-up on NPH.  CT head and Shunt XR series was done on 10/17/24.  She is doing well today.  Denies any changes in stability while walking.  Denies any cognitive decline since her last follow-up.  Her main complaint today is occasional tenderness around the shunt valve and shunt tubing.  Denies any redness.  Denies any sores.  Denies any recent signs of infection.    History of Present Illness    The following portions of the patient's history were reviewed and updated as appropriate: allergies, current medications, past family history, past medical history, past social history, past surgical history, and problem list.    Past Medical History:   Diagnosis Date    Arthritis     Asthma     Chest pain     Chronic kidney disease     Depression     Dizziness 11/11/2020    Went to ER with dizziness and generalized weakness--found to have hydrocephalus.     Ganglion cyst     right leg    GERD (gastroesophageal reflux disease)     Gout     Hiatal hernia     Hypertension     Hypokalemia     Mild mitral regurgitation     Normal pressure hydrocephalus     Personality disorder     PER NOTE IN EPIC     PONV (postoperative nausea and vomiting)     Recovering alcoholic     SOBER SINCE 2003    Thyroid nodule     Tricuspid regurgitation     Vitamin D deficiency         Past Surgical History:   Procedure Laterality Date    APPENDECTOMY      BREAST LUMPECTOMY      CARDIAC ELECTROPHYSIOLOGY PROCEDURE N/A 05/26/2021    Procedure: Pacemaker DC new [place];  Surgeon: Stephanie Shanks MD;  Location: [facility] INVASIVE LOCATION;  Service: Cardiology;  Laterality: N/A;    CHOLECYSTECTOMY      GANGLION CYST EXCISION      HERNIA REPAIR      HIP ARTHROPLASTY Left 2023    HYSTERECTOMY       SHUNT INSERTION N/A 11/19/2020    Procedure: Right sided VENTRICULAR PERITONEAL SHUNT INSERTION STERIOTACTIC;  Surgeon: Xavi Silverio MD;   Location: [facility] MAIN OR;  Service: Neurosurgery;  Laterality: N/A;          Current Outpatient Medications:     acetaminophen (TYLENOL) 325 MG tablet, Take 2 tablets by mouth Every 4 (Four) Hours As Needed for Mild Pain . (Patient taking differently: Take 1 tablet by mouth Every 4 (Four) Hours As Needed for Mild Pain.), Disp:  , Rfl:     albuterol sulfate  (90 Base) MCG/ACT inhaler, 2 puffs Every 4 (Four) Hours As Needed., Disp: , Rfl:     allopurinol (ZYLOPRIM) 100 MG tablet, Take 1 tablet by mouth Daily., Disp: 90 tablet, Rfl: 3    amLODIPine (NORVASC) 5 MG tablet, Take 1 tablet by mouth Daily., Disp: 30 tablet, Rfl: 0    Ascorbic Acid (Vitamin C) 500 MG chewable tablet, Chew Daily., Disp: , Rfl:     b complex-C-folic acid 1 MG capsule, Take 1 capsule by mouth Daily., Disp: , Rfl:     Cholecalciferol (Vitamin D-3) 25 MCG (1000 UT) capsule, Take 1,000 Units by mouth Daily., Disp: , Rfl:     esomeprazole (nexIUM) 20 MG capsule, Take 1 capsule by mouth Every Morning Before Breakfast., Disp: , Rfl:     fexofenadine (ALLEGRA) 60 MG tablet, Take 1 tablet by mouth Daily., Disp: , Rfl:     furosemide (LASIX) 20 MG tablet, Take 0.5 tablets by mouth Daily. Patient takes 1 tablet a day..., Disp: , Rfl:     ipratropium-albuterol (DUO-NEB) 0.5-2.5 mg/3 ml nebulizer, , Disp: , Rfl:     metoprolol succinate XL (TOPROL-XL) 50 MG 24 hr tablet, Take 1 tablet by mouth Every Night., Disp: 30 tablet, Rfl: 0    Probiotic Product (HEALTHY COLON PO), Take 1 tablet/day by mouth., Disp: , Rfl:     sacubitril-valsartan (Entresto) 24-26 MG tablet, TAKE 1 TABLET BY MOUTH TWICE DAILY, Disp: 60 tablet, Rfl: 5     Allergies   Allergen Reactions    Iodinated Contrast Media Shortness Of Breath     Asthma attack and hives    Aspirin Other (See Comments)     WHEEZING        Doxycycline Other (See Comments)     PT DOESN'T REMEMBER     Sertraline Diarrhea, Nausea And Vomiting and Other (See Comments)     CHEST PAIN             Social History  "    Socioeconomic History    Marital status:    Tobacco Use    Smoking status: Former     Current packs/day: 0.00     Average packs/day: 2.0 packs/day for 20.0 years (40.0 ttl pk-yrs)     Types: Cigarettes     Start date:      Quit date:      Years since quittin.8    Smokeless tobacco: Never   Vaping Use    Vaping status: Never Used   Substance and Sexual Activity    Alcohol use: No     Comment: \"18 years sobriety\" quit     Drug use: No    Sexual activity: Never     Birth control/protection: Surgical        Family History   Problem Relation Age of Onset    Heart attack Other     Heart disease Other     Hypertension Father     Stroke Father     Malig Hyperthermia Neg Hx         Review of Systems   Gastrointestinal:  Positive for diarrhea.   Musculoskeletal:  Positive for neck pain and neck stiffness.   Neurological:  Positive for headaches (right sided).   All other systems reviewed and are negative.      Objective     Vitals:    24 1024   BP: 132/68   Pulse: 60   Temp: 97.3 °F (36.3 °C)   SpO2: 95%   Weight: 64.4 kg (142 lb)   Height: 144.8 cm (57\")     Body mass index is 30.73 kg/m².      Physical Exam  Awake, alert, oriented x3  Pupils equal round reactive to light  Extraocular muscles intact  Face symmetric  Speech is fluent and clear  No pronator drift  Motor exam  Bilateral deltoids 5/5, bilateral biceps 5/5, bilateral triceps 5/5, bilateral wrist extension 5/5 bilateral hand  5/5  Bilateral hip flexion 5/5, bilateral knee extension 5/5, bilateral DF/PF 5/5  No clonus  No Augusta's reflex  Slightly unsteady gait however able to walk throughout the office without the need for assistance.  Uses a rolling walker for walking longer distances  Shunt valve depresses and refills easily  Able to detect  light touch in all 4 extremities        Assessment & Plan   Independent Review of Radiographic Studies:      I personally reviewed the images from the following studies.  CT head " without contrast from 2024, x-ray shunt series from 2024  The CT and x-ray images show no evidence of break or disconnect.  There is no radiographic evidence of shunt malfunction    Medical Decision Makin-year-old female s/p right sided  shunt for NPH on 2020  -The follow-up CT head and x-ray shunt series were evaluated and the shunt appears to be intact without break or disconnect.  She has no significant worsening of her cognitive decline or gait stability.  The shunt appears to be working well.  No further neurosurgical intervention needed at this time.  -I will plan to have her follow-up as needed with any new neurologic complaints  Diagnoses and all orders for this visit:    1. S/P  shunt (Primary)    2. NPH (normal pressure hydrocephalus)      Return if symptoms worsen or fail to improve.    I spent 40 minutes reviewing the medical record, reviewing the CT images, reviewing x-ray images, discussing the management of a  shunt, discussing the signs and symptoms of NPH, discussing the natural progression of NPH

## 2024-11-06 ENCOUNTER — OFFICE VISIT (OUTPATIENT)
Dept: NEUROSURGERY | Facility: CLINIC | Age: 79
End: 2024-11-06
Payer: MEDICARE

## 2024-11-06 VITALS
HEART RATE: 60 BPM | DIASTOLIC BLOOD PRESSURE: 68 MMHG | TEMPERATURE: 97.3 F | WEIGHT: 142 LBS | HEIGHT: 57 IN | SYSTOLIC BLOOD PRESSURE: 132 MMHG | BODY MASS INDEX: 30.63 KG/M2 | OXYGEN SATURATION: 95 %

## 2024-11-06 DIAGNOSIS — G91.2 NPH (NORMAL PRESSURE HYDROCEPHALUS): ICD-10-CM

## 2024-11-06 DIAGNOSIS — Z98.2 S/P VP SHUNT: Primary | ICD-10-CM

## 2024-12-12 ENCOUNTER — CLINICAL SUPPORT NO REQUIREMENTS (OUTPATIENT)
Dept: CARDIOLOGY | Facility: CLINIC | Age: 79
End: 2024-12-12
Payer: MEDICARE

## 2024-12-12 DIAGNOSIS — Z95.0 PACEMAKER: Primary | ICD-10-CM

## 2025-03-10 ENCOUNTER — OFFICE VISIT (OUTPATIENT)
Dept: CARDIOLOGY | Facility: CLINIC | Age: 80
End: 2025-03-10
Payer: MEDICARE

## 2025-03-10 VITALS
SYSTOLIC BLOOD PRESSURE: 129 MMHG | DIASTOLIC BLOOD PRESSURE: 73 MMHG | HEART RATE: 64 BPM | WEIGHT: 139 LBS | HEIGHT: 57 IN | BODY MASS INDEX: 29.99 KG/M2

## 2025-03-10 DIAGNOSIS — Z95.0 PACEMAKER: ICD-10-CM

## 2025-03-10 DIAGNOSIS — R06.02 SOB (SHORTNESS OF BREATH): Primary | ICD-10-CM

## 2025-03-10 DIAGNOSIS — I10 ESSENTIAL HYPERTENSION: ICD-10-CM

## 2025-03-10 PROCEDURE — 3078F DIAST BP <80 MM HG: CPT | Performed by: INTERNAL MEDICINE

## 2025-03-10 PROCEDURE — 3074F SYST BP LT 130 MM HG: CPT | Performed by: INTERNAL MEDICINE

## 2025-03-10 PROCEDURE — 99213 OFFICE O/P EST LOW 20 MIN: CPT | Performed by: INTERNAL MEDICINE

## 2025-03-10 PROCEDURE — 93000 ELECTROCARDIOGRAM COMPLETE: CPT | Performed by: INTERNAL MEDICINE

## 2025-03-10 RX ORDER — PANTOPRAZOLE SODIUM 40 MG/1
40 TABLET, DELAYED RELEASE ORAL DAILY
COMMUNITY

## 2025-03-10 NOTE — PROGRESS NOTES
1 YR FOLLOW UP   Subjective:        Mary Boyle is a 79 y.o. female who here for follow up    CC  Follow-up hypertension pacemaker shortness of  HPI  79-year-old female with hypertension pacemaker shortness of breath here for the follow-up denies any chest pains or tightness in the chest     Problems Addressed this Visit          Cardiac and Vasculature    Essential hypertension    Pacemaker       Pulmonary and Pneumonias    SOB (shortness of breath) - Primary    Relevant Orders    PYP Imaging for Cardiac Amyloidosis (Completed)    Adult Transthoracic Echo Complete W/ Cont if Necessary Per Protocol (Completed)     Diagnoses         Codes Comments      SOB (shortness of breath)    -  Primary ICD-10-CM: R06.02  ICD-9-CM: 786.05       Pacemaker     ICD-10-CM: Z95.0  ICD-9-CM: V45.01       Essential hypertension     ICD-10-CM: I10  ICD-9-CM: 401.9           .    The following portions of the patient's history were reviewed and updated as appropriate: allergies, current medications, past family history, past medical history, past social history, past surgical history and problem list.    Past Medical History:   Diagnosis Date    Arthritis     Asthma     Chest pain     Chronic kidney disease     Depression     Dizziness 11/11/2020    Went to ER with dizziness and generalized weakness--found to have hydrocephalus.     Ganglion cyst     right leg    GERD (gastroesophageal reflux disease)     Gout     Hiatal hernia     Hypertension     Hypokalemia     Mild mitral regurgitation     Normal pressure hydrocephalus     Personality disorder     PER NOTE IN EPIC     PONV (postoperative nausea and vomiting)     Recovering alcoholic     SOBER SINCE 2003    Thyroid nodule     Tricuspid regurgitation     Vitamin D deficiency      reports that she quit smoking about 20 years ago. Her smoking use included cigarettes. She started smoking about 40 years ago. She has a 40 pack-year smoking history. She has been exposed to tobacco  "smoke. She has never used smokeless tobacco. She reports that she does not drink alcohol and does not use drugs.   Family History   Problem Relation Age of Onset    Heart attack Other     Heart disease Other     Hypertension Father     Stroke Father     Malig Hyperthermia Neg Hx        Review of Systems  Constitutional: No wt loss, fever, fatigue  Gastrointestinal: No nausea, abdominal pain  Behavioral/Psych: No insomnia or anxiety   Cardiovascular no chest pains or tightness in the chest  Objective:       Physical Exam  /73   Pulse 64   Ht 144.8 cm (57\")   Wt 63 kg (139 lb)   BMI 30.08 kg/m²   General appearance: No acute changes   Neck: Trachea midline; NECK, supple, no thyromegaly or lymphadenopathy   Lungs: Normal size and shape, normal breath sounds, equal distribution of air, no rales and rhonchi   CV: S1-S2 regular, no murmurs, no rub, no gallop   Abdomen: Soft, nontender; no masses , no abnormal abdominal sounds   Extremities: No deformity , normal color , no peripheral edema   Skin: Normal temperature, turgor and texture; no rash, ulcers            ECG 12 Lead    Date/Time: 3/10/2025 1:47 PM  Performed by: Stephanie Shanks MD    Authorized by: Stephanie Shanks MD  Comparison: compared with previous ECG   Similar to previous ECG  Rhythm: sinus rhythm  Conduction: non-specific intraventricular conduction delay    Clinical impression: abnormal EKG            Echocardiogram:    Results for orders placed during the hospital encounter of 03/16/23    Adult Transthoracic Echo Complete W/ Cont if Necessary Per Protocol    Interpretation Summary    Left ventricular ejection fraction appears to be 51 - 55%.    Left ventricular diastolic function is consistent with (grade Ia w/high LAP) impaired relaxation.    Estimated right ventricular systolic pressure from tricuspid regurgitation is normal (<35 mmHg).          Current Outpatient Medications:     acetaminophen (TYLENOL) 325 MG tablet, Take 2 " tablets by mouth Every 4 (Four) Hours As Needed for Mild Pain . (Patient taking differently: Take 1 tablet by mouth Every 4 (Four) Hours As Needed for Mild Pain.), Disp:  , Rfl:     albuterol sulfate  (90 Base) MCG/ACT inhaler, 2 puffs Every 4 (Four) Hours As Needed., Disp: , Rfl:     allopurinol (ZYLOPRIM) 100 MG tablet, Take 1 tablet by mouth Daily., Disp: 90 tablet, Rfl: 3    amLODIPine (NORVASC) 5 MG tablet, Take 1 tablet by mouth Daily., Disp: 30 tablet, Rfl: 0    Ascorbic Acid (Vitamin C) 500 MG chewable tablet, Chew Daily., Disp: , Rfl:     b complex-C-folic acid 1 MG capsule, Take 1 capsule by mouth Daily., Disp: , Rfl:     Cholecalciferol (Vitamin D-3) 25 MCG (1000 UT) capsule, Take 1,000 Units by mouth Daily., Disp: , Rfl:     fexofenadine (ALLEGRA) 60 MG tablet, Take 1 tablet by mouth Daily., Disp: , Rfl:     furosemide (LASIX) 20 MG tablet, Take 0.5 tablets by mouth Daily. Patient takes 1 tablet a day..., Disp: , Rfl:     ipratropium-albuterol (DUO-NEB) 0.5-2.5 mg/3 ml nebulizer, , Disp: , Rfl:     metoprolol succinate XL (TOPROL-XL) 50 MG 24 hr tablet, Take 1 tablet by mouth Every Night., Disp: 30 tablet, Rfl: 0    sacubitril-valsartan (Entresto) 24-26 MG tablet, TAKE 1 TABLET BY MOUTH TWICE DAILY (Patient taking differently: Take 1 tablet by mouth Daily.), Disp: 60 tablet, Rfl: 5    pantoprazole (PROTONIX) 40 MG EC tablet, Take 1 tablet by mouth Daily., Disp: , Rfl:    Assessment:                Plan:          ICD-10-CM ICD-9-CM   1. SOB (shortness of breath)  R06.02 786.05   2. Pacemaker  Z95.0 V45.01   3. Essential hypertension  I10 401.9     1. SOB (shortness of breath)  Considering patient's medical condition as well as the risk factors, patient will require echocardiogram for further evaluation for the LV function, four-chamber evaluation, including the pressures, valvular function and  pericardial disease and pericardial effusion    - PYP Imaging for Cardiac Amyloidosis; Future  - Adult  Transthoracic Echo Complete W/ Cont if Necessary Per Protocol; Future    2. Pacemaker  Pacemaker functioning normal    3. Essential hypertension  Patient understands importance of blood pressure check at home which patient does regularly and the blood pressures are well under control to the level of less than 140/90        ECHO, PYP SCAN    FOLLOW UP  COUNSELING:    Mary Segura was given to patient for the following topics: diagnostic results, risk factor reductions, impressions, risks and benefits of treatment options and importance of treatment compliance .       SMOKING COUNSELING:        Dictated using Dragon dictation

## 2025-03-13 ENCOUNTER — HOSPITAL ENCOUNTER (OUTPATIENT)
Dept: CARDIOLOGY | Facility: HOSPITAL | Age: 80
Discharge: HOME | End: 2025-03-13
Admitting: INTERNAL MEDICINE
Payer: MEDICARE

## 2025-03-13 VITALS
WEIGHT: 139 LBS | DIASTOLIC BLOOD PRESSURE: 75 MMHG | BODY MASS INDEX: 29.99 KG/M2 | HEIGHT: 57 IN | SYSTOLIC BLOOD PRESSURE: 142 MMHG

## 2025-03-13 DIAGNOSIS — R06.02 SOB (SHORTNESS OF BREATH): ICD-10-CM

## 2025-03-13 PROCEDURE — 93306 TTE W/DOPPLER COMPLETE: CPT

## 2025-03-13 PROCEDURE — 93306 TTE W/DOPPLER COMPLETE: CPT | Performed by: INTERNAL MEDICINE

## 2025-03-14 LAB
AORTIC DIMENSIONLESS INDEX: 0.69 (DI)
ASCENDING AORTA: 2.9 CM
AV MEAN PRESS GRAD SYS DOP V1V2: 3.6 MMHG
AV VMAX SYS DOP: 130.8 CM/SEC
BH CV ECHO MEAS - ACS: 1.68 CM
BH CV ECHO MEAS - AO MAX PG: 6.8 MMHG
BH CV ECHO MEAS - AO ROOT DIAM: 3 CM
BH CV ECHO MEAS - AO V2 VTI: 30.6 CM
BH CV ECHO MEAS - AVA(I,D): 2.11 CM2
BH CV ECHO MEAS - EDV(CUBED): 72.1 ML
BH CV ECHO MEAS - EDV(MOD-SP2): 54 ML
BH CV ECHO MEAS - EDV(MOD-SP4): 54 ML
BH CV ECHO MEAS - EF(MOD-SP2): 57.4 %
BH CV ECHO MEAS - EF(MOD-SP4): 59.3 %
BH CV ECHO MEAS - ESV(CUBED): 24.9 ML
BH CV ECHO MEAS - ESV(MOD-SP2): 23 ML
BH CV ECHO MEAS - ESV(MOD-SP4): 22 ML
BH CV ECHO MEAS - FS: 29.8 %
BH CV ECHO MEAS - IVS/LVPW: 1.02 CM
BH CV ECHO MEAS - IVSD: 0.89 CM
BH CV ECHO MEAS - LAT PEAK E' VEL: 6.4 CM/SEC
BH CV ECHO MEAS - LV DIASTOLIC VOL/BSA (35-75): 35 CM2
BH CV ECHO MEAS - LV MASS(C)D: 114.5 GRAMS
BH CV ECHO MEAS - LV MAX PG: 4.6 MMHG
BH CV ECHO MEAS - LV MEAN PG: 2.21 MMHG
BH CV ECHO MEAS - LV SYSTOLIC VOL/BSA (12-30): 14.3 CM2
BH CV ECHO MEAS - LV V1 MAX: 106.7 CM/SEC
BH CV ECHO MEAS - LV V1 VTI: 21.1 CM
BH CV ECHO MEAS - LVIDD: 4.2 CM
BH CV ECHO MEAS - LVIDS: 2.9 CM
BH CV ECHO MEAS - LVOT AREA: 3.1 CM2
BH CV ECHO MEAS - LVOT DIAM: 1.98 CM
BH CV ECHO MEAS - LVPWD: 0.88 CM
BH CV ECHO MEAS - MED PEAK E' VEL: 4.4 CM/SEC
BH CV ECHO MEAS - MV A DUR: 0.12 SEC
BH CV ECHO MEAS - MV A MAX VEL: 128.5 CM/SEC
BH CV ECHO MEAS - MV DEC SLOPE: 227.1 CM/SEC2
BH CV ECHO MEAS - MV DEC TIME: 0.26 SEC
BH CV ECHO MEAS - MV E MAX VEL: 79.1 CM/SEC
BH CV ECHO MEAS - MV E/A: 0.62
BH CV ECHO MEAS - MV MAX PG: 5.9 MMHG
BH CV ECHO MEAS - MV MEAN PG: 1.81 MMHG
BH CV ECHO MEAS - MV P1/2T: 106.8 MSEC
BH CV ECHO MEAS - MV V2 VTI: 37.3 CM
BH CV ECHO MEAS - MVA(P1/2T): 2.06 CM2
BH CV ECHO MEAS - MVA(VTI): 1.73 CM2
BH CV ECHO MEAS - PA ACC TIME: 0.13 SEC
BH CV ECHO MEAS - PA V2 MAX: 96 CM/SEC
BH CV ECHO MEAS - RAP SYSTOLE: 3 MMHG
BH CV ECHO MEAS - RV MAX PG: 2.32 MMHG
BH CV ECHO MEAS - RV V1 MAX: 76.2 CM/SEC
BH CV ECHO MEAS - RV V1 VTI: 17.1 CM
BH CV ECHO MEAS - RVSP: 25 MMHG
BH CV ECHO MEAS - SV(LVOT): 64.6 ML
BH CV ECHO MEAS - SV(MOD-SP2): 31 ML
BH CV ECHO MEAS - SV(MOD-SP4): 32 ML
BH CV ECHO MEAS - SVI(LVOT): 41.9 ML/M2
BH CV ECHO MEAS - SVI(MOD-SP2): 20.1 ML/M2
BH CV ECHO MEAS - SVI(MOD-SP4): 20.8 ML/M2
BH CV ECHO MEAS - TAPSE (>1.6): 1.42 CM
BH CV ECHO MEAS - TR MAX PG: 22 MMHG
BH CV ECHO MEAS - TR MAX VEL: 234.6 CM/SEC
BH CV ECHO MEASUREMENTS AVERAGE E/E' RATIO: 14.65
BH CV XLRA - TDI S': 8.8 CM/SEC
LEFT ATRIUM VOLUME INDEX: 23.2 ML/M2
LV EF BIPLANE MOD: 59.9 %
SINUS: 2.6 CM
STJ: 2.38 CM

## 2025-03-16 ENCOUNTER — CLAIMS CREATED FROM THE CLAIM WINDOW (OUTPATIENT)
Dept: URBAN - METROPOLITAN AREA MEDICAL CENTER 19 | Facility: MEDICAL CENTER | Age: 80
End: 2025-03-16
Payer: COMMERCIAL

## 2025-03-16 DIAGNOSIS — D50.9 IRON DEFICIENCY ANEMIA, UNSPECIFIED: ICD-10-CM

## 2025-03-16 DIAGNOSIS — D64.9 ANEMIA, UNSPECIFIED: ICD-10-CM

## 2025-03-16 DIAGNOSIS — R09.89 OTHER SPECIFIED SYMPTOMS AND SIGNS INVOLVING THE CIRCULATORY AND RESPIRATORY SYSTEMS: ICD-10-CM

## 2025-03-16 PROCEDURE — 99223 1ST HOSP IP/OBS HIGH 75: CPT | Performed by: INTERNAL MEDICINE

## 2025-03-17 ENCOUNTER — CLAIMS CREATED FROM THE CLAIM WINDOW (OUTPATIENT)
Dept: URBAN - METROPOLITAN AREA MEDICAL CENTER 19 | Facility: MEDICAL CENTER | Age: 80
End: 2025-03-17
Payer: COMMERCIAL

## 2025-03-17 DIAGNOSIS — D64.9 ANEMIA, UNSPECIFIED: ICD-10-CM

## 2025-03-17 DIAGNOSIS — D50.9 IRON DEFICIENCY ANEMIA, UNSPECIFIED: ICD-10-CM

## 2025-03-17 DIAGNOSIS — R09.89 OTHER SPECIFIED SYMPTOMS AND SIGNS INVOLVING THE CIRCULATORY AND RESPIRATORY SYSTEMS: ICD-10-CM

## 2025-03-17 PROCEDURE — 99232 SBSQ HOSP IP/OBS MODERATE 35: CPT | Performed by: INTERNAL MEDICINE

## 2025-03-18 ENCOUNTER — CLAIMS CREATED FROM THE CLAIM WINDOW (OUTPATIENT)
Dept: URBAN - METROPOLITAN AREA MEDICAL CENTER 19 | Facility: MEDICAL CENTER | Age: 80
End: 2025-03-18
Payer: COMMERCIAL

## 2025-03-18 ENCOUNTER — TELEPHONE (OUTPATIENT)
Dept: CARDIOLOGY | Facility: CLINIC | Age: 80
End: 2025-03-18
Payer: MEDICARE

## 2025-03-18 DIAGNOSIS — D50.9 IRON DEFICIENCY ANEMIA, UNSPECIFIED: ICD-10-CM

## 2025-03-18 DIAGNOSIS — D13.2 BENIGN NEOPLASM OF DUODENUM: ICD-10-CM

## 2025-03-18 DIAGNOSIS — R09.89 OTHER SPECIFIED SYMPTOMS AND SIGNS INVOLVING THE CIRCULATORY AND RESPIRATORY SYSTEMS: ICD-10-CM

## 2025-03-18 DIAGNOSIS — D64.9 ANEMIA, UNSPECIFIED: ICD-10-CM

## 2025-03-18 PROCEDURE — 44364 SMALL BOWEL ENDOSCOPY: CPT | Performed by: INTERNAL MEDICINE

## 2025-03-18 PROCEDURE — 44366 SMALL BOWEL ENDOSCOPY: CPT | Performed by: INTERNAL MEDICINE

## 2025-03-19 ENCOUNTER — CLAIMS CREATED FROM THE CLAIM WINDOW (OUTPATIENT)
Dept: URBAN - METROPOLITAN AREA MEDICAL CENTER 19 | Facility: MEDICAL CENTER | Age: 80
End: 2025-03-19
Payer: COMMERCIAL

## 2025-03-19 ENCOUNTER — HOSPITAL ENCOUNTER (OUTPATIENT)
Dept: CARDIOLOGY | Facility: HOSPITAL | Age: 80
Discharge: HOME | End: 2025-03-19
Admitting: INTERNAL MEDICINE
Payer: MEDICARE

## 2025-03-19 VITALS — HEIGHT: 57 IN | WEIGHT: 140 LBS | BODY MASS INDEX: 30.2 KG/M2

## 2025-03-19 DIAGNOSIS — R09.89 OTHER SPECIFIED SYMPTOMS AND SIGNS INVOLVING THE CIRCULATORY AND RESPIRATORY SYSTEMS: ICD-10-CM

## 2025-03-19 DIAGNOSIS — D13.2 BENIGN NEOPLASM OF DUODENUM: ICD-10-CM

## 2025-03-19 DIAGNOSIS — R06.02 SOB (SHORTNESS OF BREATH): ICD-10-CM

## 2025-03-19 DIAGNOSIS — D50.9 IRON DEFICIENCY ANEMIA, UNSPECIFIED: ICD-10-CM

## 2025-03-19 DIAGNOSIS — D64.9 ANEMIA, UNSPECIFIED: ICD-10-CM

## 2025-03-19 PROCEDURE — 45378 DIAGNOSTIC COLONOSCOPY: CPT | Performed by: INTERNAL MEDICINE

## 2025-03-19 PROCEDURE — 78803 RP LOCLZJ TUM SPECT 1 AREA: CPT

## 2025-03-19 PROCEDURE — 34310000005 TECHNETIUM TC99M PYROPHOSPHATE: Performed by: INTERNAL MEDICINE

## 2025-03-19 PROCEDURE — A9538 TC99M PYROPHOSPHATE: HCPCS | Performed by: INTERNAL MEDICINE

## 2025-03-19 RX ADMIN — TECHNETIUM TC99M PYROPHOSPHATE 1 DOSE: 12 INJECTION INTRAVENOUS at 10:25

## 2025-03-20 ENCOUNTER — TELEPHONE ENCOUNTER (OUTPATIENT)
Dept: URBAN - METROPOLITAN AREA CLINIC 113 | Facility: CLINIC | Age: 80
End: 2025-03-20

## 2025-03-21 NOTE — PROGRESS NOTES
Just read the PYP scan for this lady.  It is grade 2, the up take is not as strong as the other positive once but I think it tracks pretty well with a cardiac contour.  I am unfortunately unable to add any more description or text to the summary due to the structured reading template, so it has to come out as stating equivocal, but I do think this is a positive study albeit somewhat mild.  Just want to give you a heads up.      Subjective:        Mary Boyle is a 79 y.o. female who here for follow up    CC  Shortness of breath  HPI  79-year-old female with hypertension pacemaker and shortness of breath had a PYP scan which was borderline     Problems Addressed this Visit          Cardiac and Vasculature    Essential hypertension    Pacemaker       Pulmonary and Pneumonias    SOB (shortness of breath) - Primary     Diagnoses         Codes Comments      SOB (shortness of breath)    -  Primary ICD-10-CM: R06.02  ICD-9-CM: 786.05       Pacemaker     ICD-10-CM: Z95.0  ICD-9-CM: V45.01       Essential hypertension     ICD-10-CM: I10  ICD-9-CM: 401.9           .    The following portions of the patient's history were reviewed and updated as appropriate: allergies, current medications, past family history, past medical history, past social history, past surgical history and problem list.    Past Medical History:   Diagnosis Date    Alcoholism Sobriety date / 2-.    Arthritis     Asthma     Chest pain     CHF (congestive heart failure)     Chronic kidney disease     COPD (chronic obstructive pulmonary disease)     Coronary artery disease     Depression     Dizziness 11/11/2020    Went to ER with dizziness and generalized weakness--found to have hydrocephalus.     Ganglion cyst     right leg    GERD (gastroesophageal reflux disease)     Gout     Headache     Hiatal hernia     Hypertension     Hypokalemia     Low back pain     Mild mitral regurgitation     Normal pressure hydrocephalus     Personality disorder      "PER NOTE IN EPIC     PONV (postoperative nausea and vomiting)     Recovering alcoholic     SOBER SINCE     Thyroid nodule     Tricuspid regurgitation     Vitamin D deficiency      reports that she quit smoking about 20 years ago. Her smoking use included cigarettes. She started smoking about 40 years ago. She has a 40 pack-year smoking history. She has been exposed to tobacco smoke. She has never used smokeless tobacco. She reports that she does not drink alcohol and does not use drugs.   Family History   Problem Relation Age of Onset    Heart attack Other     Heart disease Other     Cancer Mother          1967    Hypertension Father             Stroke Father     Diabetes Sister     Kidney disease Sister     Diabetes Sister     Kidney disease Sister     Asthma Maternal Uncle     Diabetes Sister     Heart disease Sister               Malig Hyperthermia Neg Hx        Review of Systems  Constitutional: No wt loss, fever, fatigue  Gastrointestinal: No nausea, abdominal pain  Behavioral/Psych: No insomnia or anxiety   Cardiovascular shortness of breath  Objective:       Physical Exam  /80   Pulse 67   Ht 144.8 cm (57.01\")   Wt 64 kg (141 lb 3.2 oz)   SpO2 100%   BMI 30.55 kg/m²   General appearance: No acute changes   Neck: Trachea midline; NECK, supple, no thyromegaly or lymphadenopathy   Lungs: Normal size and shape, normal breath sounds, equal distribution of air, no rales and rhonchi   CV: S1-S2 regular, no murmurs, no rub, no gallop   Abdomen: Soft, nontender; no masses , no abnormal abdominal sounds   Extremities: No deformity , normal color , no peripheral edema   Skin: Normal temperature, turgor and texture; no rash, ulcers          Procedures      Echocardiogram:    Results for orders placed during the hospital encounter of 25    Adult Transthoracic Echo Complete W/ Cont if Necessary Per Protocol    Interpretation Summary    Left ventricular ejection fraction " appears to be 56 - 60%.    Left ventricular diastolic function was normal.    Estimated right ventricular systolic pressure from tricuspid regurgitation is normal (<35 mmHg).          Current Outpatient Medications:     acetaminophen (TYLENOL) 325 MG tablet, Take 2 tablets by mouth Every 4 (Four) Hours As Needed for Mild Pain . (Patient taking differently: Take 1 tablet by mouth Every 4 (Four) Hours As Needed for Mild Pain.), Disp:  , Rfl:     albuterol sulfate  (90 Base) MCG/ACT inhaler, 2 puffs Every 4 (Four) Hours As Needed., Disp: , Rfl:     allopurinol (ZYLOPRIM) 100 MG tablet, Take 1 tablet by mouth Daily., Disp: 90 tablet, Rfl: 3    amLODIPine (NORVASC) 5 MG tablet, Take 1 tablet by mouth Daily., Disp: 30 tablet, Rfl: 0    Ascorbic Acid (Vitamin C) 500 MG chewable tablet, Chew Daily., Disp: , Rfl:     b complex-C-folic acid 1 MG capsule, Take 1 capsule by mouth Daily., Disp: , Rfl:     Cholecalciferol (Vitamin D-3) 25 MCG (1000 UT) capsule, Take 1,000 Units by mouth Daily., Disp: , Rfl:     fexofenadine (ALLEGRA) 60 MG tablet, Take 1 tablet by mouth Daily., Disp: , Rfl:     furosemide (LASIX) 20 MG tablet, Take 0.5 tablets by mouth Daily. Patient takes 1 tablet a day..., Disp: , Rfl:     ipratropium-albuterol (DUO-NEB) 0.5-2.5 mg/3 ml nebulizer, , Disp: , Rfl:     metoprolol succinate XL (TOPROL-XL) 50 MG 24 hr tablet, Take 1 tablet by mouth Every Night., Disp: 30 tablet, Rfl: 0    pantoprazole (PROTONIX) 40 MG EC tablet, Take 1 tablet by mouth Daily., Disp: , Rfl:     sacubitril-valsartan (Entresto) 24-26 MG tablet, TAKE 1 TABLET BY MOUTH TWICE DAILY (Patient taking differently: Take 1 tablet by mouth Daily.), Disp: 60 tablet, Rfl: 5   Assessment:                Plan:          ICD-10-CM ICD-9-CM   1. SOB (shortness of breath)  R06.02 786.05   2. Pacemaker  Z95.0 V45.01   3. Essential hypertension  I10 401.9     1. SOB (shortness of breath)  May need further evaluation with repeating the PYP scan    2.  Pacemaker  Pacemaker functioning normal    3. Essential hypertension  Blood pressure under control continue current treatment      6 MONTHS WHEN PYP WILL BE REPEATED  COUNSELING:    Mary Segura was given to patient for the following topics: diagnostic results, risk factor reductions, impressions, risks and benefits of treatment options and importance of treatment compliance .       SMOKING COUNSELING:        Dictated using Dragon dictation

## 2025-03-31 ENCOUNTER — OFFICE VISIT (OUTPATIENT)
Dept: CARDIOLOGY | Facility: CLINIC | Age: 80
End: 2025-03-31
Payer: MEDICARE

## 2025-03-31 VITALS
DIASTOLIC BLOOD PRESSURE: 80 MMHG | HEIGHT: 57 IN | OXYGEN SATURATION: 100 % | HEART RATE: 67 BPM | BODY MASS INDEX: 30.46 KG/M2 | SYSTOLIC BLOOD PRESSURE: 144 MMHG | WEIGHT: 141.2 LBS

## 2025-03-31 DIAGNOSIS — R06.02 SOB (SHORTNESS OF BREATH): Primary | ICD-10-CM

## 2025-03-31 DIAGNOSIS — I10 ESSENTIAL HYPERTENSION: ICD-10-CM

## 2025-03-31 DIAGNOSIS — Z95.0 PACEMAKER: ICD-10-CM

## 2025-03-31 PROCEDURE — 3077F SYST BP >= 140 MM HG: CPT | Performed by: INTERNAL MEDICINE

## 2025-03-31 PROCEDURE — 99213 OFFICE O/P EST LOW 20 MIN: CPT | Performed by: INTERNAL MEDICINE

## 2025-03-31 PROCEDURE — 3079F DIAST BP 80-89 MM HG: CPT | Performed by: INTERNAL MEDICINE

## 2025-04-01 ENCOUNTER — TELEPHONE ENCOUNTER (OUTPATIENT)
Dept: URBAN - METROPOLITAN AREA CLINIC 113 | Facility: CLINIC | Age: 80
End: 2025-04-01

## 2025-04-15 ENCOUNTER — OFFICE VISIT (OUTPATIENT)
Dept: URBAN - METROPOLITAN AREA CLINIC 113 | Facility: CLINIC | Age: 80
End: 2025-04-15
Payer: COMMERCIAL

## 2025-04-15 ENCOUNTER — DASHBOARD ENCOUNTERS (OUTPATIENT)
Age: 80
End: 2025-04-15

## 2025-04-15 DIAGNOSIS — D50.0 IRON DEFICIENCY ANEMIA SECONDARY TO BLOOD LOSS (CHRONIC): ICD-10-CM

## 2025-04-15 DIAGNOSIS — K31.819 DUODENAL ARTERIOVENOUS MALFORMATION: ICD-10-CM

## 2025-04-15 PROCEDURE — 99213 OFFICE O/P EST LOW 20 MIN: CPT | Performed by: NURSE PRACTITIONER

## 2025-04-15 RX ORDER — ATORVASTATIN CALCIUM 40 MG/1
TAKE 1 TABLET BY MOUTH EVERY DAY IN THE EVENING TABLET, FILM COATED ORAL
Qty: 90 EACH | Refills: 0 | Status: ACTIVE | COMMUNITY

## 2025-04-15 RX ORDER — FERROUS SULFATE 325(65) MG
TAKE 1 TABLET (325 MG TOTAL) BY MOUTH EVERY OTHER DAY WITH FOOD FOR 60 DAYS TABLET ORAL
Qty: 30 EACH | Refills: 0 | Status: ACTIVE | COMMUNITY

## 2025-04-15 RX ORDER — DONEPEZIL HYDROCHLORIDE 5 MG/1
1 TABLET AT BEDTIME TABLET, FILM COATED ORAL ONCE A DAY
Qty: 30 | Status: ACTIVE | COMMUNITY
Start: 2025-04-15

## 2025-04-15 RX ORDER — LEVOTHYROXINE SODIUM 0.09 MG/1
TABLET ORAL
Qty: 90 TABLET | Status: ACTIVE | COMMUNITY

## 2025-04-15 RX ORDER — NIFEDIPINE 30 MG/1
TAKE 1 TABLET BY MOUTH DAILY FOR 30 DAYS TABLET, EXTENDED RELEASE ORAL
Qty: 30 EACH | Refills: 0 | Status: ACTIVE | COMMUNITY

## 2025-04-15 RX ORDER — METFORMIN HYDROCHLORIDE 500 MG/1
1 TABLET WITH EVENING MEAL TABLET, EXTENDED RELEASE ORAL ONCE A DAY
Qty: 30 | Status: ACTIVE | COMMUNITY
Start: 2025-04-15

## 2025-04-15 RX ORDER — LOSARTAN POTASSIUM AND HYDROCHLOROTHIAZIDE 100; 12.5 MG/1; MG/1
1 TABLET TABLET, FILM COATED ORAL ONCE A DAY
Qty: 30 | Status: ACTIVE | COMMUNITY
Start: 2025-04-15

## 2025-04-15 NOTE — HPI-TODAY'S VISIT:
79-year-old female with a history of hypertension, diabetes, hypothyroidism, chronic anemia, presenting for follow-up after recent hospitalization. She was seen in hospital consultation on 3/16/2025 for evaluation of iron deficiency anemia, on presentation with nausea, vomiting, and diarrhea.  Labs on admission demonstrated hemoglobin of 6.3 and ferritin of 5.  There was high suspicion for Heyde syndrome due to chronic iron deficiency anemia requiring multiple blood transfusion and concern for concurrent aortic stenosis.  She was transfused with PRBCs and IV iron.  She underwent a colonoscopy on 3/19/2025 which revealed nonbleeding internal hemorrhoids, otherwise normal.  EGD with small bowel enteroscopy on 3/18/2025 was notable for a normal esophagus, small hiatal hernia, single nonbleeding angioectasias in the duodenum status post APC, a 12 mm polyp removed from the third portion of the duodenum, a single nonbleeding angiectasia in the jejunum treated with APC.  Polypectomy pathology demonstrated adenoma, negative for dysplasia. She has done well following hospital discharge.  She is without abdominal complaint.  Her bowel habits are regular.  No red blood in the stool.  No abdominal pain, nausea or vomiting.  She had labs with her PCP at [facility] yesterday.  She takes oral iron every other day.  A family member accompanies her and assists with history.

## 2025-06-12 ENCOUNTER — CLINICAL SUPPORT NO REQUIREMENTS (OUTPATIENT)
Dept: CARDIOLOGY | Facility: CLINIC | Age: 80
End: 2025-06-12
Payer: MEDICARE

## 2025-06-12 DIAGNOSIS — Z95.0 PACEMAKER: Primary | ICD-10-CM

## 2025-07-15 RX ORDER — SACUBITRIL AND VALSARTAN 24; 26 MG/1; MG/1
1 TABLET, FILM COATED ORAL 2 TIMES DAILY
Qty: 60 TABLET | Refills: 5 | OUTPATIENT
Start: 2025-07-15

## 2025-07-15 RX ORDER — SACUBITRIL AND VALSARTAN 24; 26 MG/1; MG/1
1 TABLET, FILM COATED ORAL DAILY
Qty: 30 TABLET | Refills: 5 | Status: SHIPPED | OUTPATIENT
Start: 2025-07-15

## 2025-07-22 ENCOUNTER — OFFICE VISIT (OUTPATIENT)
Dept: URBAN - METROPOLITAN AREA CLINIC 113 | Facility: CLINIC | Age: 80
End: 2025-07-22
Payer: COMMERCIAL

## 2025-07-22 DIAGNOSIS — D50.0 IRON DEFICIENCY ANEMIA SECONDARY TO BLOOD LOSS (CHRONIC): ICD-10-CM

## 2025-07-22 DIAGNOSIS — K31.819 DUODENAL ARTERIOVENOUS MALFORMATION: ICD-10-CM

## 2025-07-22 PROCEDURE — 99214 OFFICE O/P EST MOD 30 MIN: CPT | Performed by: NURSE PRACTITIONER

## 2025-07-22 RX ORDER — FERROUS SULFATE 325(65) MG
TAKE 1 TABLET (325 MG TOTAL) BY MOUTH EVERY OTHER DAY WITH FOOD FOR 60 DAYS TABLET ORAL
Qty: 30 EACH | Refills: 0 | Status: ON HOLD | COMMUNITY

## 2025-07-22 RX ORDER — NIFEDIPINE 30 MG/1
TAKE 1 TABLET BY MOUTH DAILY FOR 30 DAYS TABLET, EXTENDED RELEASE ORAL
Qty: 30 EACH | Refills: 0 | Status: ACTIVE | COMMUNITY

## 2025-07-22 RX ORDER — DONEPEZIL HYDROCHLORIDE 5 MG/1
1 TABLET AT BEDTIME TABLET, FILM COATED ORAL ONCE A DAY
Qty: 30 | Status: ACTIVE | COMMUNITY
Start: 2025-04-15

## 2025-07-22 RX ORDER — ATORVASTATIN CALCIUM 40 MG/1
TAKE 1 TABLET BY MOUTH EVERY DAY IN THE EVENING TABLET, FILM COATED ORAL
Qty: 90 EACH | Refills: 0 | Status: ACTIVE | COMMUNITY

## 2025-07-22 RX ORDER — LEVOTHYROXINE SODIUM 50 UG/1
1 TABLET IN THE MORNING ON AN EMPTY STOMACH TABLET ORAL DAILY
Qty: 90 TABLET | Status: ACTIVE | COMMUNITY

## 2025-07-22 RX ORDER — LOSARTAN POTASSIUM AND HYDROCHLOROTHIAZIDE 100; 12.5 MG/1; MG/1
1 TABLET TABLET, FILM COATED ORAL ONCE A DAY
Qty: 30 | Status: ACTIVE | COMMUNITY
Start: 2025-04-15

## 2025-07-22 RX ORDER — METFORMIN HYDROCHLORIDE 500 MG/1
1 TABLET WITH EVENING MEAL TABLET, EXTENDED RELEASE ORAL ONCE A DAY
Qty: 30 | Status: ACTIVE | COMMUNITY
Start: 2025-04-15

## 2025-07-22 NOTE — HPI-TODAY'S VISIT:
79-year-old female with a history of hypertension, diabetes, hypothyroidism, chronic anemia, presenting for follow-up. She was seen in the office in April for follow-up after hospitalization for iron deficiency anemia secondary to small bowel AVMs status post APC.  She was to continue oral iron and recent lab results were requested from her PCP to trend her hemoglobin, with plan for repeat EGD with small bowel enteroscopy as required for retreatment. She is doing well from GI standpoint.  No abdominal pain, nausea or vomiting.  Her bowel habits are regular.  No red blood per rectum or melena.  A family member accompanies her to the visit. She had labs with her PCP in April which show H/H 11.4/38.1, MCV 77.

## 2025-07-22 NOTE — HPI-OTHER HISTORIES
She was seen in hospital consultation on 3/16/2025 for evaluation of iron deficiency anemia, on presentation with nausea, vomiting, and diarrhea. Labs on admission demonstrated hemoglobin of 6.3 and ferritin of 5. There was high suspicion for Heyde syndrome due to chronic iron deficiency anemia requiring multiple blood transfusion and concern for concurrent aortic stenosis. She was transfused with PRBCs and IV iron. She underwent a colonoscopy on 3/19/2025 which revealed nonbleeding internal hemorrhoids, otherwise normal. EGD with small bowel enteroscopy on 3/18/2025 was notable for a normal esophagus, small hiatal hernia, single nonbleeding angioectasias in the duodenum status post APC, a 12 mm polyp removed from the third portion of the duodenum, a single nonbleeding angiectasia in the jejunum treated with APC. Polypectomy pathology demonstrated adenoma, negative for dysplasia.

## (undated) DEVICE — PERFORATOR CDM WO/ DURAGUARD 14/11

## (undated) DEVICE — 3M™ IOBAN™ 2 ANTIMICROBIAL INCISE DRAPE 6650EZ: Brand: IOBAN™ 2

## (undated) DEVICE — TBG INSUFFLATION LUER LOCK: Brand: MEDLINE INDUSTRIES, INC.

## (undated) DEVICE — CRANIOTOMY DRAPE, STERILE: Brand: MEDLINE

## (undated) DEVICE — ENDOPATH XCEL BLADELESS TROCARS WITH STABILITY SLEEVES: Brand: ENDOPATH XCEL

## (undated) DEVICE — LOU PACE DEFIB: Brand: MEDLINE INDUSTRIES, INC.

## (undated) DEVICE — DISPOSABLE IRRIGATION BIPOLAR CORD, M1000 TYPE: Brand: KIRWAN

## (undated) DEVICE — GLV SURG BIOGEL LTX PF 7 1/2

## (undated) DEVICE — SUT MNCRYL PLS ANTIB UD 4/0 PS2 18IN

## (undated) DEVICE — SOL IRR PHYSIOSOL BTL 1000ML

## (undated) DEVICE — MARKR SKIN W/RULR ULTRAFINETP

## (undated) DEVICE — PATIENT TRACKER 9734887 NON-INVASIVE

## (undated) DEVICE — ELECTRD BLD EZ CLN MOD XLNG 2.75IN

## (undated) DEVICE — GLV SURG SENSICARE PI LF PF 7.5 GRN STRL

## (undated) DEVICE — MEDI-VAC YANKAUER SUCTION HANDLE W/BULBOUS TIP: Brand: CARDINAL HEALTH

## (undated) DEVICE — SMOKE EVACUATION TUBING WITH 7/8 IN TO 1/4 IN REDUCER: Brand: BUFFALO FILTER

## (undated) DEVICE — ENDOPATH XCEL UNIVERSAL TROCAR STABLILITY SLEEVES: Brand: ENDOPATH XCEL

## (undated) DEVICE — 1016 S-DRAPE IRRIG POUCH 10/BOX: Brand: STERI-DRAPE™

## (undated) DEVICE — SPNG GZ WOVN 4X4IN 12PLY 10/BX STRL

## (undated) DEVICE — Device

## (undated) DEVICE — INTRO SHEATH PRELUDE SNAP .038 6F 13CM W/SDPRT

## (undated) DEVICE — STPCK 3WY D201 DISCOFIX

## (undated) DEVICE — APPL CHLORAPREP HI/LITE 26ML ORNG

## (undated) DEVICE — MAYFIELD® DISPOSABLE ADULT SKULL PIN (PLASTIC BASE): Brand: MAYFIELD®

## (undated) DEVICE — STYLET 9735428 2 COIL SINGLE PACKAGE

## (undated) DEVICE — GLV SURG SENSICARE PI PF LF 8.5 GRN STRL

## (undated) DEVICE — SCANLAN® SUTURE BOOT™ INSTRUMENT JAW COVERS - ORIGINAL YELLOW, STANDARD PKG (5 PAIR/CARTRIDGE, 1 CARTRIDGE/PKG): Brand: SCANLAN® SUTURE BOOT™ INSTRUMENT JAW COVERS

## (undated) DEVICE — DRSNG GZ PETROLTM XEROFORM CURAD 1X8IN STRL

## (undated) DEVICE — SUREFIT, DUAL DISPERSIVE ELECTRODE, CONTACT QUALITY MONITOR: Brand: SUREFIT

## (undated) DEVICE — STPLR SKIN VISISTAT WD 35CT

## (undated) DEVICE — CVR PROB 96IN LF STRL

## (undated) DEVICE — KT ANTI FOG W/FLD AND SPNG

## (undated) DEVICE — SUT SILK 2/0 TIES 18IN A185H

## (undated) DEVICE — DRP SLUSH WARMR MACH CIR 44X44IN

## (undated) DEVICE — ANTIBACTERIAL UNDYED BRAIDED (POLYGLACTIN 910), SYNTHETIC ABSORBABLE SUTURE: Brand: COATED VICRYL

## (undated) DEVICE — SOL ISO/ALC RUB 70PCT 4OZ

## (undated) DEVICE — LAPAROSCOPIC SMOKE FILTRATION SYSTEM: Brand: PALL LAPAROSHIELD® PLUS LAPAROSCOPIC SMOKE FILTRATION SYSTEM

## (undated) DEVICE — NDL HYPO PRECISIONGLIDE REG 25G 1 1/2

## (undated) DEVICE — CONN TBG Y 5 IN 1 LF STRL

## (undated) DEVICE — SHEET, DRAPE, SPLIT, STERILE: Brand: MEDLINE

## (undated) DEVICE — ENDOPATH PNEUMONEEDLE INSUFFLATION NEEDLES WITH LUER LOCK CONNECTORS 120MM: Brand: ENDOPATH

## (undated) DEVICE — CONTAINER,SPECIMEN,OR STERILE,4OZ: Brand: MEDLINE

## (undated) DEVICE — ADHS SKIN DERMABOND TOP ADVANCED

## (undated) DEVICE — TOWEL,OR,DSP,ST,BLUE,STD,4/PK,20PK/CS: Brand: MEDLINE

## (undated) DEVICE — SUT SILK 2 SUTUPAK TIE 60IN SA8H 2STRAND

## (undated) DEVICE — GLV SURG BIOGEL LTX PF 8

## (undated) DEVICE — TUBING, SUCTION, 1/4" X 20', STRAIGHT: Brand: MEDLINE INDUSTRIES, INC.

## (undated) DEVICE — INTRO EP CLASSICSHEATH SPLT STD 12.5F 13CM

## (undated) DEVICE — SPHR MARKR STEALTH STATION

## (undated) DEVICE — PK NEURO SPINE 40